# Patient Record
Sex: FEMALE | Race: WHITE | NOT HISPANIC OR LATINO | Employment: OTHER | URBAN - METROPOLITAN AREA
[De-identification: names, ages, dates, MRNs, and addresses within clinical notes are randomized per-mention and may not be internally consistent; named-entity substitution may affect disease eponyms.]

---

## 2023-10-10 ENCOUNTER — TELEPHONE (OUTPATIENT)
Dept: PRIMARY CARE | Facility: CLINIC | Age: 83
End: 2023-10-10
Payer: MEDICARE

## 2023-10-10 NOTE — TELEPHONE ENCOUNTER
Amrita with [facility] called to speak to provider. LT leg wound is larger today with yellow slough. Also there is an odor from wound even after cleaning. Pt is usually followed by Benita Cooper CNP at [facility].

## 2023-10-11 ENCOUNTER — HOME VISIT (OUTPATIENT)
Dept: POST ACUTE CARE | Facility: EXTERNAL LOCATION | Age: 83
End: 2023-10-11
Payer: MEDICARE

## 2023-10-11 VITALS
TEMPERATURE: 99.2 F | SYSTOLIC BLOOD PRESSURE: 136 MMHG | DIASTOLIC BLOOD PRESSURE: 70 MMHG | OXYGEN SATURATION: 97 % | HEART RATE: 77 BPM | RESPIRATION RATE: 16 BRPM

## 2023-10-11 DIAGNOSIS — L03.115 BILATERAL CELLULITIS OF LOWER LEG: Primary | ICD-10-CM

## 2023-10-11 DIAGNOSIS — L03.116 BILATERAL CELLULITIS OF LOWER LEG: Primary | ICD-10-CM

## 2023-10-11 PROBLEM — L02.416 CELLULITIS AND ABSCESS OF LEFT LEG: Status: ACTIVE | Noted: 2023-10-11

## 2023-10-11 PROCEDURE — 99349 HOME/RES VST EST MOD MDM 40: CPT | Performed by: NURSE PRACTITIONER

## 2023-10-11 ASSESSMENT — PAIN SCALES - GENERAL: PAINLEVEL: 8

## 2023-10-11 ASSESSMENT — ENCOUNTER SYMPTOMS
FATIGUE: 1
GASTROINTESTINAL NEGATIVE: 1
ALLERGIC/IMMUNOLOGIC NEGATIVE: 1
ENDOCRINE NEGATIVE: 1
WEAKNESS: 1
BRUISES/BLEEDS EASILY: 1
EYES NEGATIVE: 1
SHORTNESS OF BREATH: 1
WOUND: 1
PSYCHIATRIC NEGATIVE: 1

## 2023-10-11 NOTE — PROGRESS NOTES
Subjective   Patient ID: Amrita Lopez is a 82 y.o. female who presents for follow up bilat leg wounds.     HPI Pt seen sitting in her wheelchair in [facility], pt transported via wheelchair to her private apartment and assisted into her recliner where she sat with legs elevated. Amrita from [facility] requesting visit for leg wound on left leg appearing worse. Dressing changes being done every other day by [facility]. Area being cleaned with normal saline and calcium alginate applied. Covered with Abd pad and Kerlix and tubi . Pt with c/o leg pain in left leg, very tender with touch.  Pt denies headaches, dizziness, chills, or night sweats. Denies vision or hearing problems. Denies CP or palpitations. Admits to cough and SOB, pt states she still smokes. Admits to incontinence. Denies N/V/D or constipation. Admits to good appetite and sleeping well, naps during day and with c/o being tired.      Review of Systems   Constitutional:  Positive for fatigue.   HENT: Negative.     Eyes: Negative.    Respiratory:  Positive for shortness of breath.    Cardiovascular:  Positive for leg swelling.   Gastrointestinal: Negative.    Endocrine: Negative.    Genitourinary: Negative.    Musculoskeletal:  Positive for gait problem.   Skin:  Positive for wound.   Allergic/Immunologic: Negative.    Neurological:  Positive for weakness.   Hematological:  Bruises/bleeds easily.   Psychiatric/Behavioral: Negative.       Objective   /70 (BP Location: Left arm) Comment (BP Location): left wrist  Pulse 77   Temp 37.3 °C (99.2 °F) (Temporal)   Resp 16   SpO2 97%     Physical Exam  Constitutional:       Appearance: She is obese.   HENT:      Head: Normocephalic.      Nose: Nose normal.      Mouth/Throat:      Mouth: Mucous membranes are moist.   Eyes:      Pupils: Pupils are equal, round, and reactive to light.   Cardiovascular:      Rate and Rhythm: Normal rate and regular rhythm.      Pulses: Normal pulses.      Heart sounds:  Normal heart sounds.   Pulmonary:      Breath sounds: Decreased air movement present. Rhonchi present.      Comments: Moist non productive cough  Abdominal:      General: Bowel sounds are normal.      Palpations: Abdomen is soft.   Genitourinary:     Comments: incontinent  Musculoskeletal:      Cervical back: Normal range of motion.      Right lower leg: 3+ Edema present.      Left lower leg: 3+ Edema present.   Skin:     Capillary Refill: Capillary refill takes less than 2 seconds.      Findings: Bruising present.      Comments: Right leg with small nickel size openings on lower leg with slight serous drainage. Right leg with extremely dry and flaky skin. Left inner lower leg with open area with yellow slough with brownish yellow drainage. Wound with positive odor. Wound with beefy red color and irregular borders. Area around wound with dry flaky skin. Area cleansed with normal saline, DSD applied.   Ecchymosis upper extremities   Neurological:      General: No focal deficit present.      Mental Status: She is alert and oriented to person, place, and time. Mental status is at baseline.   Psychiatric:         Mood and Affect: Mood normal.         Behavior: Behavior normal.         Thought Content: Thought content normal.         Judgment: Judgment normal.       Problem List Items Addressed This Visit    None  Visit Diagnoses       Bilateral cellulitis of lower leg    -  Primary    Start doxycylcline 100 mg po bid for 7 days  Vashe wash to legs  Consult Stephani Cameron CNP wound care specialist  Collaborated plan with Amrita MACKEY from [facility]

## 2023-10-18 ENCOUNTER — NURSING HOME VISIT (OUTPATIENT)
Dept: PRIMARY CARE | Facility: CLINIC | Age: 83
End: 2023-10-18
Payer: MEDICARE

## 2023-10-18 DIAGNOSIS — L03.90 WOUND CELLULITIS: Primary | ICD-10-CM

## 2023-10-18 PROCEDURE — 99348 HOME/RES VST EST LOW MDM 30: CPT

## 2023-10-18 ASSESSMENT — PAIN SCALES - GENERAL: PAINLEVEL: 0-NO PAIN

## 2023-10-20 PROBLEM — I69.998 WEAKNESS AS LATE EFFECT OF CEREBROVASCULAR ACCIDENT (CVA): Status: ACTIVE | Noted: 2023-10-20

## 2023-10-20 PROBLEM — F41.9 ANXIETY: Status: ACTIVE | Noted: 2023-10-20

## 2023-10-20 PROBLEM — E55.9 VITAMIN D DEFICIENCY: Status: ACTIVE | Noted: 2023-10-20

## 2023-10-20 PROBLEM — F03.90 DEMENTIA (MULTI): Status: ACTIVE | Noted: 2023-10-20

## 2023-10-20 PROBLEM — R26.2 DIFFICULTY IN WALKING: Status: ACTIVE | Noted: 2023-10-20

## 2023-10-20 PROBLEM — R06.00 DYSPNEA: Status: ACTIVE | Noted: 2023-10-20

## 2023-10-20 PROBLEM — R29.6 RECURRENT FALLS: Status: ACTIVE | Noted: 2023-10-20

## 2023-10-20 PROBLEM — M25.562 ACUTE PAIN OF LEFT KNEE: Status: ACTIVE | Noted: 2023-10-20

## 2023-10-20 PROBLEM — I63.9 STROKE (MULTI): Status: ACTIVE | Noted: 2023-10-20

## 2023-10-20 PROBLEM — E78.5 HYPERLIPIDEMIA: Status: ACTIVE | Noted: 2023-10-20

## 2023-10-20 PROBLEM — F17.200 TOBACCO USE DISORDER: Status: ACTIVE | Noted: 2023-10-20

## 2023-10-20 PROBLEM — M19.90 OSTEOARTHRITIS: Status: ACTIVE | Noted: 2023-10-20

## 2023-10-20 PROBLEM — I10 HYPERTENSION: Status: ACTIVE | Noted: 2023-10-20

## 2023-10-20 PROBLEM — G47.19 EXCESSIVE DAYTIME SLEEPINESS: Status: ACTIVE | Noted: 2023-10-20

## 2023-10-20 PROBLEM — G92.8 TOXIC METABOLIC ENCEPHALOPATHY: Status: ACTIVE | Noted: 2023-10-20

## 2023-10-20 PROBLEM — I63.9 CEREBROVASCULAR ACCIDENT (MULTI): Status: ACTIVE | Noted: 2023-10-20

## 2023-10-20 PROBLEM — R52 GENERALIZED PAIN: Status: ACTIVE | Noted: 2023-10-20

## 2023-10-20 PROBLEM — R53.1 WEAKNESS AS LATE EFFECT OF CEREBROVASCULAR ACCIDENT (CVA): Status: ACTIVE | Noted: 2023-10-20

## 2023-10-20 PROBLEM — F32.9 MAJOR DEPRESSIVE DISORDER, SINGLE EPISODE, UNSPECIFIED: Status: ACTIVE | Noted: 2023-10-20

## 2023-10-20 PROBLEM — R63.5 WEIGHT GAIN: Status: ACTIVE | Noted: 2023-10-20

## 2023-10-20 RX ORDER — ATORVASTATIN CALCIUM 10 MG/1
10 TABLET, FILM COATED ORAL DAILY
Status: ON HOLD | COMMUNITY
End: 2024-05-24

## 2023-10-20 RX ORDER — AMMONIUM LACTATE 12 G/100G
1 LOTION TOPICAL 2 TIMES DAILY
COMMUNITY
Start: 2023-05-04

## 2023-10-20 RX ORDER — LANOLIN ALCOHOL/MO/W.PET/CERES
500 CREAM (GRAM) TOPICAL DAILY
COMMUNITY

## 2023-10-20 RX ORDER — MAGNESIUM HYDROXIDE 2400 MG/10ML
30 SUSPENSION ORAL DAILY PRN
COMMUNITY

## 2023-10-20 RX ORDER — NYSTATIN 100000 [USP'U]/G
1 POWDER TOPICAL 2 TIMES DAILY
Status: ON HOLD | COMMUNITY
Start: 2023-07-26 | End: 2024-05-24

## 2023-10-20 RX ORDER — FUROSEMIDE 40 MG/1
40 TABLET ORAL DAILY
COMMUNITY
Start: 2023-03-08

## 2023-10-20 RX ORDER — LORATADINE 10 MG/1
10 TABLET ORAL DAILY
COMMUNITY

## 2023-10-20 RX ORDER — CLOPIDOGREL BISULFATE 75 MG/1
75 TABLET ORAL DAILY
COMMUNITY

## 2023-10-20 RX ORDER — MELOXICAM 7.5 MG/1
7.5 TABLET ORAL DAILY
Status: ON HOLD | COMMUNITY
Start: 2023-09-19 | End: 2024-05-24

## 2023-10-20 RX ORDER — ACETAMINOPHEN 325 MG/1
650 TABLET ORAL EVERY 6 HOURS PRN
COMMUNITY

## 2023-10-20 RX ORDER — ACETAMINOPHEN 500 MG
500 TABLET ORAL EVERY 8 HOURS
COMMUNITY
End: 2023-10-29 | Stop reason: HOSPADM

## 2023-10-20 ASSESSMENT — ENCOUNTER SYMPTOMS
SHORTNESS OF BREATH: 0
PSYCHIATRIC NEGATIVE: 1
WOUND: 1
HEMATOLOGIC/LYMPHATIC NEGATIVE: 1
ENDOCRINE NEGATIVE: 1
GASTROINTESTINAL NEGATIVE: 1
COUGH: 0
CHILLS: 0
NEUROLOGICAL NEGATIVE: 1
FATIGUE: 0
EYES NEGATIVE: 1
FEVER: 0
ROS SKIN COMMENTS: BILATERAL LOWER LEGS
MUSCULOSKELETAL NEGATIVE: 1

## 2023-10-21 NOTE — PROGRESS NOTES
Subjective   Patient ID: Amrita Lopez is a 82 y.o. female who is assisted living/ home patient being seen and evaluated for reported yellow drainage to left leg.     HPI Visit made to patient for reports of yellow drainage to lower left leg with odor.  Pt currently being followed by home health care/wound care. Pt recently treated with doxycycline for same report a week ago. Pt currently still smoking. Pt denies fever, chills, admits to pain in left leg. Takes tylenol which is effective.     Current Outpatient Medications on File Prior to Visit   Medication Sig Dispense Refill    acetaminophen (Tylenol) 325 mg tablet Take 2 tablets (650 mg) by mouth every 6 hours if needed for mild pain (1 - 3), headaches or fever (temp greater than 38.0 C).      albuterol 108 (90 Base) MCG/ACT inhaler Inhale 2 puffs every 2 hours if needed for wheezing or shortness of breath.      ammonium lactate (Lac-Hydrin) 12 % lotion Apply 1 Application topically 2 times a day.      atorvastatin (Lipitor) 10 mg tablet Take 1 tablet (10 mg) by mouth once daily.      clopidogrel (Plavix) 75 mg tablet Take 1 tablet (75 mg) by mouth once daily.      cyanocobalamin (Vitamin B-12) 1,000 mcg tablet Take 0.5 tablets (500 mcg) by mouth once daily.      furosemide (Lasix) 40 mg tablet Take 1 tablet (40 mg) by mouth once daily.      loratadine (Claritin) 10 mg tablet Take 1 tablet (10 mg) by mouth once daily.      magnesium hydroxide (Milk of Magnesia) 2,400 mg/10 mL suspension suspension Take 30 mL by mouth once daily as needed for constipation.      meloxicam (Mobic) 7.5 mg tablet Take 1 tablet (7.5 mg) by mouth once daily.      nystatin (Mycostatin) 100,000 unit/gram powder Apply 1 Application topically 2 times a day.      acetaminophen (Tylenol) 500 mg tablet Take 1 tablet (500 mg) by mouth every 8 hours.       No current facility-administered medications on file prior to visit.         Review of Systems   Constitutional:  Negative for chills,  fatigue and fever.   HENT: Negative.     Eyes: Negative.    Respiratory:  Negative for cough and shortness of breath.    Cardiovascular:  Positive for leg swelling.   Gastrointestinal: Negative.    Endocrine: Negative.    Genitourinary: Negative.    Musculoskeletal: Negative.    Skin:  Positive for wound.        Bilateral lower legs   Neurological: Negative.    Hematological: Negative.    Psychiatric/Behavioral: Negative.         Objective   There were no vitals taken for this visit.    Physical Exam  Constitutional:       Appearance: Normal appearance.   HENT:      Head: Normocephalic.   Eyes:      Pupils: Pupils are equal, round, and reactive to light.   Cardiovascular:      Rate and Rhythm: Regular rhythm.   Musculoskeletal:      Cervical back: Normal range of motion.      Right lower leg: Edema present.      Left lower leg: Edema present.   Skin:     General: Skin is warm.      Comments:  Left inner lower leg with open area with yellow slough with brownish yellow drainage. Wound with positive odor. Wound with beefy red color and   Neurological:      Mental Status: She is alert. Mental status is at baseline.   Psychiatric:         Behavior: Behavior normal.       Assessment/Plan   Diagnoses and all orders for this visit:  Wound cellulitis  Comments:  Order written in facility to obtain wound culture. Collaboration with Stephani Cameron [facility] wound care specialist who will obtain PCR of wound if not completed.

## 2023-10-24 ENCOUNTER — HOSPITAL ENCOUNTER (INPATIENT)
Facility: HOSPITAL | Age: 83
LOS: 5 days | Discharge: NURSING, REHAB OR LONG-TERM CARE | DRG: 603 | End: 2023-10-29
Attending: EMERGENCY MEDICINE | Admitting: INTERNAL MEDICINE
Payer: MEDICARE

## 2023-10-24 DIAGNOSIS — K21.9 GASTROESOPHAGEAL REFLUX DISEASE WITHOUT ESOPHAGITIS: ICD-10-CM

## 2023-10-24 DIAGNOSIS — R06.00 DYSPNEA, UNSPECIFIED TYPE: ICD-10-CM

## 2023-10-24 DIAGNOSIS — R11.2 NAUSEA AND VOMITING, UNSPECIFIED VOMITING TYPE: ICD-10-CM

## 2023-10-24 DIAGNOSIS — K59.00 CONSTIPATION, UNSPECIFIED CONSTIPATION TYPE: ICD-10-CM

## 2023-10-24 DIAGNOSIS — L03.119 CELLULITIS OF LOWER EXTREMITY, UNSPECIFIED LATERALITY: Primary | ICD-10-CM

## 2023-10-24 DIAGNOSIS — J44.9 CHRONIC OBSTRUCTIVE PULMONARY DISEASE, UNSPECIFIED COPD TYPE (MULTI): ICD-10-CM

## 2023-10-24 DIAGNOSIS — I10 DIASTOLIC HYPERTENSION: ICD-10-CM

## 2023-10-24 LAB
ALBUMIN SERPL-MCNC: 3.9 G/DL (ref 3.5–5)
ALP BLD-CCNC: 93 U/L (ref 35–125)
ALT SERPL-CCNC: 8 U/L (ref 5–40)
ANION GAP SERPL CALC-SCNC: 7 MMOL/L
AST SERPL-CCNC: 11 U/L (ref 5–40)
BASOPHILS # BLD AUTO: 0.08 X10*3/UL (ref 0–0.1)
BASOPHILS NFR BLD AUTO: 0.7 %
BILIRUB SERPL-MCNC: 0.3 MG/DL (ref 0.1–1.2)
BUN SERPL-MCNC: 20 MG/DL (ref 8–25)
CALCIUM SERPL-MCNC: 9.5 MG/DL (ref 8.5–10.4)
CHLORIDE SERPL-SCNC: 101 MMOL/L (ref 97–107)
CO2 SERPL-SCNC: 30 MMOL/L (ref 24–31)
CREAT SERPL-MCNC: 0.8 MG/DL (ref 0.4–1.6)
EOSINOPHIL # BLD AUTO: 0.84 X10*3/UL (ref 0–0.4)
EOSINOPHIL NFR BLD AUTO: 7.3 %
ERYTHROCYTE [DISTWIDTH] IN BLOOD BY AUTOMATED COUNT: 13.5 % (ref 11.5–14.5)
GFR SERPL CREATININE-BSD FRML MDRD: 75 ML/MIN/1.73M*2
GLUCOSE SERPL-MCNC: 104 MG/DL (ref 65–99)
HCT VFR BLD AUTO: 41.4 % (ref 36–46)
HGB BLD-MCNC: 13.2 G/DL (ref 12–16)
IMM GRANULOCYTES # BLD AUTO: 0.04 X10*3/UL (ref 0–0.5)
IMM GRANULOCYTES NFR BLD AUTO: 0.3 % (ref 0–0.9)
LYMPHOCYTES # BLD AUTO: 3.21 X10*3/UL (ref 0.8–3)
LYMPHOCYTES NFR BLD AUTO: 28 %
MCH RBC QN AUTO: 31.3 PG (ref 26–34)
MCHC RBC AUTO-ENTMCNC: 31.9 G/DL (ref 32–36)
MCV RBC AUTO: 98 FL (ref 80–100)
MONOCYTES # BLD AUTO: 0.87 X10*3/UL (ref 0.05–0.8)
MONOCYTES NFR BLD AUTO: 7.6 %
NEUTROPHILS # BLD AUTO: 6.43 X10*3/UL (ref 1.6–5.5)
NEUTROPHILS NFR BLD AUTO: 56.1 %
NRBC BLD-RTO: 0 /100 WBCS (ref 0–0)
PLATELET # BLD AUTO: 275 X10*3/UL (ref 150–450)
PMV BLD AUTO: 9.4 FL (ref 7.5–11.5)
POTASSIUM SERPL-SCNC: 4.5 MMOL/L (ref 3.4–5.1)
PROT SERPL-MCNC: 6.4 G/DL (ref 5.9–7.9)
RBC # BLD AUTO: 4.22 X10*6/UL (ref 4–5.2)
SODIUM SERPL-SCNC: 138 MMOL/L (ref 133–145)
WBC # BLD AUTO: 11.5 X10*3/UL (ref 4.4–11.3)

## 2023-10-24 PROCEDURE — 96372 THER/PROPH/DIAG INJ SC/IM: CPT | Performed by: INTERNAL MEDICINE

## 2023-10-24 PROCEDURE — 99285 EMERGENCY DEPT VISIT HI MDM: CPT | Mod: 25 | Performed by: EMERGENCY MEDICINE

## 2023-10-24 PROCEDURE — 1200000002 HC GENERAL ROOM WITH TELEMETRY DAILY

## 2023-10-24 PROCEDURE — 36415 COLL VENOUS BLD VENIPUNCTURE: CPT

## 2023-10-24 PROCEDURE — 80053 COMPREHEN METABOLIC PANEL: CPT

## 2023-10-24 PROCEDURE — 2500000004 HC RX 250 GENERAL PHARMACY W/ HCPCS (ALT 636 FOR OP/ED): Performed by: INTERNAL MEDICINE

## 2023-10-24 PROCEDURE — 85025 COMPLETE CBC W/AUTO DIFF WBC: CPT

## 2023-10-24 PROCEDURE — 2500000004 HC RX 250 GENERAL PHARMACY W/ HCPCS (ALT 636 FOR OP/ED): Performed by: EMERGENCY MEDICINE

## 2023-10-24 PROCEDURE — 81003 URINALYSIS AUTO W/O SCOPE: CPT | Performed by: INTERNAL MEDICINE

## 2023-10-24 PROCEDURE — 2500000001 HC RX 250 WO HCPCS SELF ADMINISTERED DRUGS (ALT 637 FOR MEDICARE OP): Performed by: INTERNAL MEDICINE

## 2023-10-24 RX ORDER — ACETAMINOPHEN 650 MG/1
650 SUPPOSITORY RECTAL EVERY 4 HOURS PRN
Status: DISCONTINUED | OUTPATIENT
Start: 2023-10-24 | End: 2023-10-29 | Stop reason: HOSPADM

## 2023-10-24 RX ORDER — MELOXICAM 7.5 MG/1
7.5 TABLET ORAL DAILY
Status: DISCONTINUED | OUTPATIENT
Start: 2023-10-25 | End: 2023-10-29 | Stop reason: HOSPADM

## 2023-10-24 RX ORDER — ACETAMINOPHEN 650 MG/1
650 SUPPOSITORY RECTAL EVERY 4 HOURS PRN
Status: DISCONTINUED | OUTPATIENT
Start: 2023-10-24 | End: 2023-10-24 | Stop reason: SDUPTHER

## 2023-10-24 RX ORDER — ACETAMINOPHEN 325 MG/1
650 TABLET ORAL EVERY 4 HOURS PRN
Status: DISCONTINUED | OUTPATIENT
Start: 2023-10-24 | End: 2023-10-29 | Stop reason: HOSPADM

## 2023-10-24 RX ORDER — CLOPIDOGREL BISULFATE 75 MG/1
75 TABLET ORAL DAILY
Status: DISCONTINUED | OUTPATIENT
Start: 2023-10-25 | End: 2023-10-29 | Stop reason: HOSPADM

## 2023-10-24 RX ORDER — POLYETHYLENE GLYCOL 3350 17 G/17G
17 POWDER, FOR SOLUTION ORAL DAILY PRN
Status: DISCONTINUED | OUTPATIENT
Start: 2023-10-24 | End: 2023-10-29 | Stop reason: HOSPADM

## 2023-10-24 RX ORDER — ACETAMINOPHEN 500 MG
500 TABLET ORAL EVERY 8 HOURS SCHEDULED
Status: DISCONTINUED | OUTPATIENT
Start: 2023-10-24 | End: 2023-10-29 | Stop reason: HOSPADM

## 2023-10-24 RX ORDER — CLOTRIMAZOLE AND BETAMETHASONE DIPROPIONATE 10; .64 MG/G; MG/G
1 CREAM TOPICAL 2 TIMES DAILY
Status: CANCELLED | OUTPATIENT
Start: 2023-10-24

## 2023-10-24 RX ORDER — MAGNESIUM HYDROXIDE 2400 MG/10ML
30 SUSPENSION ORAL DAILY PRN
Status: DISCONTINUED | OUTPATIENT
Start: 2023-10-24 | End: 2023-10-29 | Stop reason: HOSPADM

## 2023-10-24 RX ORDER — ENOXAPARIN SODIUM 100 MG/ML
40 INJECTION SUBCUTANEOUS EVERY 12 HOURS SCHEDULED
Status: DISCONTINUED | OUTPATIENT
Start: 2023-10-24 | End: 2023-10-29 | Stop reason: HOSPADM

## 2023-10-24 RX ORDER — ONDANSETRON 4 MG/1
4 TABLET, ORALLY DISINTEGRATING ORAL EVERY 8 HOURS PRN
Status: DISCONTINUED | OUTPATIENT
Start: 2023-10-24 | End: 2023-10-29 | Stop reason: HOSPADM

## 2023-10-24 RX ORDER — ATORVASTATIN CALCIUM 10 MG/1
10 TABLET, FILM COATED ORAL NIGHTLY
Status: DISCONTINUED | OUTPATIENT
Start: 2023-10-24 | End: 2023-10-29 | Stop reason: HOSPADM

## 2023-10-24 RX ORDER — ALBUTEROL SULFATE 0.83 MG/ML
2.5 SOLUTION RESPIRATORY (INHALATION) EVERY 2 HOUR PRN
Status: DISCONTINUED | OUTPATIENT
Start: 2023-10-24 | End: 2023-10-29 | Stop reason: HOSPADM

## 2023-10-24 RX ORDER — ALBUTEROL SULFATE 90 UG/1
2 AEROSOL, METERED RESPIRATORY (INHALATION) EVERY 2 HOUR PRN
Status: DISCONTINUED | OUTPATIENT
Start: 2023-10-24 | End: 2023-10-24

## 2023-10-24 RX ORDER — ACETAMINOPHEN 325 MG/1
650 TABLET ORAL EVERY 6 HOURS PRN
Status: DISCONTINUED | OUTPATIENT
Start: 2023-10-24 | End: 2023-10-29 | Stop reason: HOSPADM

## 2023-10-24 RX ORDER — GUAIFENESIN/DEXTROMETHORPHAN 100-10MG/5
5 SYRUP ORAL EVERY 4 HOURS PRN
Status: DISCONTINUED | OUTPATIENT
Start: 2023-10-24 | End: 2023-10-24 | Stop reason: SDUPTHER

## 2023-10-24 RX ORDER — ACETAMINOPHEN 160 MG/5ML
650 SOLUTION ORAL EVERY 4 HOURS PRN
Status: DISCONTINUED | OUTPATIENT
Start: 2023-10-24 | End: 2023-10-29 | Stop reason: HOSPADM

## 2023-10-24 RX ORDER — ACETAMINOPHEN 160 MG/5ML
650 SOLUTION ORAL EVERY 4 HOURS PRN
Status: DISCONTINUED | OUTPATIENT
Start: 2023-10-24 | End: 2023-10-24 | Stop reason: SDUPTHER

## 2023-10-24 RX ORDER — ONDANSETRON HYDROCHLORIDE 2 MG/ML
4 INJECTION, SOLUTION INTRAVENOUS EVERY 8 HOURS PRN
Status: DISCONTINUED | OUTPATIENT
Start: 2023-10-24 | End: 2023-10-29 | Stop reason: HOSPADM

## 2023-10-24 RX ORDER — GUAIFENESIN/DEXTROMETHORPHAN 100-10MG/5
5 SYRUP ORAL EVERY 4 HOURS PRN
Status: DISCONTINUED | OUTPATIENT
Start: 2023-10-24 | End: 2023-10-29 | Stop reason: HOSPADM

## 2023-10-24 RX ORDER — FUROSEMIDE 10 MG/ML
40 INJECTION INTRAMUSCULAR; INTRAVENOUS EVERY 8 HOURS
Status: COMPLETED | OUTPATIENT
Start: 2023-10-24 | End: 2023-10-25

## 2023-10-24 RX ORDER — POLYETHYLENE GLYCOL 3350 17 G/17G
17 POWDER, FOR SOLUTION ORAL DAILY PRN
Status: DISCONTINUED | OUTPATIENT
Start: 2023-10-24 | End: 2023-10-24 | Stop reason: SDUPTHER

## 2023-10-24 RX ORDER — LORATADINE 10 MG/1
10 TABLET ORAL DAILY
Status: DISCONTINUED | OUTPATIENT
Start: 2023-10-25 | End: 2023-10-27

## 2023-10-24 RX ORDER — SENNOSIDES 8.6 MG/1
2 TABLET ORAL 2 TIMES DAILY
Status: DISCONTINUED | OUTPATIENT
Start: 2023-10-24 | End: 2023-10-29 | Stop reason: HOSPADM

## 2023-10-24 RX ORDER — HEPARIN SODIUM 5000 [USP'U]/ML
7500 INJECTION, SOLUTION INTRAVENOUS; SUBCUTANEOUS EVERY 8 HOURS SCHEDULED
Status: DISCONTINUED | OUTPATIENT
Start: 2023-10-24 | End: 2023-10-24 | Stop reason: ALTCHOICE

## 2023-10-24 RX ORDER — AMMONIUM LACTATE 12 G/100G
1 LOTION TOPICAL 2 TIMES DAILY
Status: DISCONTINUED | OUTPATIENT
Start: 2023-10-24 | End: 2023-10-29 | Stop reason: HOSPADM

## 2023-10-24 RX ORDER — FUROSEMIDE 40 MG/1
40 TABLET ORAL DAILY
Status: DISCONTINUED | OUTPATIENT
Start: 2023-10-25 | End: 2023-10-29 | Stop reason: HOSPADM

## 2023-10-24 RX ORDER — GUAIFENESIN 600 MG/1
600 TABLET, EXTENDED RELEASE ORAL EVERY 12 HOURS PRN
Status: DISCONTINUED | OUTPATIENT
Start: 2023-10-24 | End: 2023-10-24 | Stop reason: SDUPTHER

## 2023-10-24 RX ORDER — GUAIFENESIN 600 MG/1
600 TABLET, EXTENDED RELEASE ORAL EVERY 12 HOURS PRN
Status: DISCONTINUED | OUTPATIENT
Start: 2023-10-24 | End: 2023-10-29 | Stop reason: HOSPADM

## 2023-10-24 RX ORDER — ACETAMINOPHEN 325 MG/1
650 TABLET ORAL EVERY 4 HOURS PRN
Status: DISCONTINUED | OUTPATIENT
Start: 2023-10-24 | End: 2023-10-24 | Stop reason: SDUPTHER

## 2023-10-24 RX ORDER — VANCOMYCIN 750 MG/150ML
750 INJECTION, SOLUTION INTRAVENOUS EVERY 12 HOURS
Status: DISCONTINUED | OUTPATIENT
Start: 2023-10-25 | End: 2023-10-25

## 2023-10-24 RX ORDER — UBIDECARENONE 75 MG
500 CAPSULE ORAL DAILY
Status: DISCONTINUED | OUTPATIENT
Start: 2023-10-25 | End: 2023-10-29 | Stop reason: HOSPADM

## 2023-10-24 RX ORDER — NYSTATIN 100000 [USP'U]/G
1 POWDER TOPICAL 2 TIMES DAILY
Status: DISCONTINUED | OUTPATIENT
Start: 2023-10-24 | End: 2023-10-29 | Stop reason: HOSPADM

## 2023-10-24 RX ADMIN — SENNOSIDES 17.2 MG: 8.6 TABLET, FILM COATED ORAL at 23:30

## 2023-10-24 RX ADMIN — PIPERACILLIN SODIUM AND TAZOBACTAM SODIUM 3.38 G: 3; .375 INJECTION, SOLUTION INTRAVENOUS at 19:59

## 2023-10-24 RX ADMIN — FUROSEMIDE 40 MG: 10 INJECTION, SOLUTION INTRAMUSCULAR; INTRAVENOUS at 23:30

## 2023-10-24 RX ADMIN — NYSTATIN 1 APPLICATION: 100000 POWDER TOPICAL at 23:55

## 2023-10-24 RX ADMIN — ATORVASTATIN CALCIUM 10 MG: 10 TABLET, FILM COATED ORAL at 23:29

## 2023-10-24 RX ADMIN — ENOXAPARIN SODIUM 40 MG: 40 INJECTION SUBCUTANEOUS at 23:29

## 2023-10-24 RX ADMIN — ACETAMINOPHEN 500 MG: 500 TABLET ORAL at 23:30

## 2023-10-24 RX ADMIN — Medication 1 APPLICATION: at 23:31

## 2023-10-24 SDOH — SOCIAL STABILITY: SOCIAL INSECURITY: WERE YOU ABLE TO COMPLETE ALL THE BEHAVIORAL HEALTH SCREENINGS?: YES

## 2023-10-24 SDOH — SOCIAL STABILITY: SOCIAL INSECURITY: HAVE YOU HAD THOUGHTS OF HARMING ANYONE ELSE?: NO

## 2023-10-24 ASSESSMENT — PAIN - FUNCTIONAL ASSESSMENT: PAIN_FUNCTIONAL_ASSESSMENT: 0-10

## 2023-10-24 ASSESSMENT — LIFESTYLE VARIABLES
EVER FELT BAD OR GUILTY ABOUT YOUR DRINKING: NO
AUDIT-C TOTAL SCORE: 0
AUDIT-C TOTAL SCORE: 0
HAVE YOU EVER FELT YOU SHOULD CUT DOWN ON YOUR DRINKING: NO
EVER HAD A DRINK FIRST THING IN THE MORNING TO STEADY YOUR NERVES TO GET RID OF A HANGOVER: NO
HAVE PEOPLE ANNOYED YOU BY CRITICIZING YOUR DRINKING: NO
HOW OFTEN DO YOU HAVE 6 OR MORE DRINKS ON ONE OCCASION: NEVER
HOW OFTEN DO YOU HAVE A DRINK CONTAINING ALCOHOL: NEVER
SKIP TO QUESTIONS 9-10: 1
HOW MANY STANDARD DRINKS CONTAINING ALCOHOL DO YOU HAVE ON A TYPICAL DAY: PATIENT DOES NOT DRINK
REASON UNABLE TO ASSESS: NO

## 2023-10-24 ASSESSMENT — PATIENT HEALTH QUESTIONNAIRE - PHQ9
2. FEELING DOWN, DEPRESSED OR HOPELESS: NOT AT ALL
1. LITTLE INTEREST OR PLEASURE IN DOING THINGS: NOT AT ALL
SUM OF ALL RESPONSES TO PHQ9 QUESTIONS 1 & 2: 0

## 2023-10-24 ASSESSMENT — COLUMBIA-SUICIDE SEVERITY RATING SCALE - C-SSRS
1. IN THE PAST MONTH, HAVE YOU WISHED YOU WERE DEAD OR WISHED YOU COULD GO TO SLEEP AND NOT WAKE UP?: NO
6. HAVE YOU EVER DONE ANYTHING, STARTED TO DO ANYTHING, OR PREPARED TO DO ANYTHING TO END YOUR LIFE?: NO
6. HAVE YOU EVER DONE ANYTHING, STARTED TO DO ANYTHING, OR PREPARED TO DO ANYTHING TO END YOUR LIFE?: NO
1. IN THE PAST MONTH, HAVE YOU WISHED YOU WERE DEAD OR WISHED YOU COULD GO TO SLEEP AND NOT WAKE UP?: NO
2. HAVE YOU ACTUALLY HAD ANY THOUGHTS OF KILLING YOURSELF?: NO
2. HAVE YOU ACTUALLY HAD ANY THOUGHTS OF KILLING YOURSELF?: NO

## 2023-10-24 ASSESSMENT — PAIN SCALES - GENERAL
PAINLEVEL_OUTOF10: 0 - NO PAIN
PAINLEVEL_OUTOF10: 4

## 2023-10-24 NOTE — ED PROVIDER NOTES
HPI   Chief Complaint   Patient presents with    Leg Swelling     infection       Patient is an 82-year-old female presents emergency department for bilateral leg infections worse on left than right.  She states that she thinks they have been red and swollen for the last 2 weeks approximately.  She states that last week she was on an oral antibiotic, that she did not recall the name of.  According to her records it was doxycycline.  She states that she is here today because her wound care nurse presented and felt that her infections were worsening and sent her here.  She states that her left leg is tender and oozing and foul-smelling.  She states that the wound care nurse does home visits to change her bandages.  She feels that its not getting any better after oral antibiotics.  She otherwise feels fine with no fevers, chills, nausea, vomiting, cough, congestion, shortness of breath, chest pain.      History provided by:  Patient   used: No                        Ceci Coma Scale Score: 15                  Patient History   No past medical history on file.  Past Surgical History:   Procedure Laterality Date    MR HEAD ANGIO WO IV CONTRAST  11/3/2020    MR HEAD ANGIO WO IV CONTRAST [facility]    MR HEAD ANGIO WO IV CONTRAST  3/29/2021    MR HEAD ANGIO WO IV CONTRAST [facility]    MR NECK ANGIO WO IV CONTRAST  3/30/2021    MR NECK ANGIO WO IV CONTRAST [facility]     No family history on file.  Social History     Tobacco Use    Smoking status: Every Day     Packs/day: .5     Types: Cigarettes    Smokeless tobacco: Never   Substance Use Topics    Alcohol use: Defer    Drug use: Never       Physical Exam   ED Triage Vitals [10/24/23 1624]   Temp Heart Rate Resp BP   36.6 °C (97.9 °F) 72 17 133/60      SpO2 Temp src Heart Rate Source Patient Position   100 % -- -- --      BP Location FiO2 (%)     -- --       Physical Exam  Constitutional:       Appearance: Normal  appearance.   HENT:      Head: Normocephalic and atraumatic.   Eyes:      Extraocular Movements: Extraocular movements intact.      Pupils: Pupils are equal, round, and reactive to light.   Cardiovascular:      Rate and Rhythm: Normal rate and regular rhythm.   Pulmonary:      Effort: Pulmonary effort is normal.      Breath sounds: Normal breath sounds.   Abdominal:      General: Abdomen is flat.      Palpations: Abdomen is soft.   Musculoskeletal:         General: Normal range of motion.   Skin:     General: Skin is warm.      Comments: Bilateral lower extremity erythema and warmth worse on the left than right.  Left lower extremity with ulcerative wound with clear yellowish discharge.   Neurological:      Mental Status: She is alert.         ED Course & MDM   Diagnoses as of 10/24/23 1840   Cellulitis of lower extremity, unspecified laterality   Diastolic hypertension       Medical Decision Making  Patient is an 82-year-old female presents emergency department for evaluation of bilateral lower extremity wounds and infection.    EKG was interpreted by attending physician.    Lab work done today included CMP, CBC, and wound cultures.  Lab work with borderline leukocytosis otherwise unremarkable.    Scans not warranted at today's visit.    Medications given at today's visit include IV Zosyn    I saw this patient in conjunction with Dr. Childers.  Patient has worsening bilateral cellulitis with worsening wound on the left lower extremity.  Given that patient has trialed outpatient antibiotics with no improvement of her wound and cellulitis, patient would benefit from stay for IV antibiotics and admission.  Dr. Childers spoke with patient's primary care provider Dr. Sanchez who is agreeable to admission of patient for further management.    The patient/family was counseled on clinical impression, expectations, and plan along with recommendations to admission.  All questions were answered and involved parties were  understanding and agreeable to course of treatment.  Case was discussed with admitting physician and any consultants. Bed type, ED treatment and further ED workup decided by joint decision making with admitting team and any consultants. Patient stable for admission per my assessment and further management of patient will be deferred to the inpatient setting.    ** Disclaimer:  Parts of this document were written utilizing a voice to text dictation software.  Note may contain minor transcription or typographical errors that were inadvertently transcribed by the computer software.           Irene Russo PA-C  10/24/23 5959

## 2023-10-24 NOTE — PROGRESS NOTES
Attestation note/supervisory note for BRIANA Russo      The patient is an 82-year-old female presenting to the emergency department for evaluation of cellulitis of her lower extremities.  She reportedly has phonic edema of her legs but it has been more red and weeping over the past couple of days.  She states that she believes that they started her on a red pill treat the infection over the past several days but she does not know what it was.  She states that the nurses at the facility where she resides told her that her doctor, Dr. Sanchez, wanted her admitted for IV antibiotics and they sent her to the emergency room today.  She denies any headache or visual changes.  No chest pain or shortness of breath.  No abdominal pain.  No nausea or vomiting.  No diarrhea constipation but no urinary complaints.  All pertinent positives and negatives are recorded above.  All other systems reviewed and otherwise negative.  Vital signs with diastolic hypertension but otherwise within normal limits.  Physical exam with a well-nourished well-developed female with obesity and a disheveled appearance but no evidence of acute distress.  HEENT exam with dry mucous membranes but otherwise unremarkable.  She has no evidence of airway compromise or respiratory distress.  Abdominal exam is benign.  She has no gross motor, neurologic or vascular deficits on exam.  She does have bilateral lower extremity pitting edema with erythema and warmth of both of her legs.  There is some weeping from the anterior surface of her left lower leg.  She has no gross motor, neurologic or vascular deficits on exam.      Diagnostic labs without significant abnormality      IV antibiotics were ordered.      The case was discussed with the patient's primary care physician, Dr. Sanchez, and he requested admission to his service for treatment of her cellulitis of the bilateral lower extremities.      Critical care time billing is not warranted at this time.      I  personally saw the patient and performed a substantive portion of the visit including all aspects of the medical decision making.      I reviewed the results of the diagnostic labs.      Beena Childers MD

## 2023-10-25 ENCOUNTER — APPOINTMENT (OUTPATIENT)
Dept: RADIOLOGY | Facility: HOSPITAL | Age: 83
DRG: 603 | End: 2023-10-25
Payer: MEDICARE

## 2023-10-25 LAB
ALBUMIN SERPL-MCNC: 3.4 G/DL (ref 3.5–5)
ALP BLD-CCNC: 83 U/L (ref 35–125)
ALT SERPL-CCNC: 7 U/L (ref 5–40)
ANION GAP SERPL CALC-SCNC: 8 MMOL/L
APPEARANCE UR: CLEAR
AST SERPL-CCNC: 10 U/L (ref 5–40)
BILIRUB SERPL-MCNC: 0.6 MG/DL (ref 0.1–1.2)
BILIRUB UR STRIP.AUTO-MCNC: NEGATIVE MG/DL
BUN SERPL-MCNC: 18 MG/DL (ref 8–25)
CALCIUM SERPL-MCNC: 9.1 MG/DL (ref 8.5–10.4)
CHLORIDE SERPL-SCNC: 105 MMOL/L (ref 97–107)
CO2 SERPL-SCNC: 29 MMOL/L (ref 24–31)
COLOR UR: COLORLESS
CREAT SERPL-MCNC: 0.8 MG/DL (ref 0.4–1.6)
ERYTHROCYTE [DISTWIDTH] IN BLOOD BY AUTOMATED COUNT: 13.4 % (ref 11.5–14.5)
GFR SERPL CREATININE-BSD FRML MDRD: 75 ML/MIN/1.73M*2
GLUCOSE SERPL-MCNC: 101 MG/DL (ref 65–99)
GLUCOSE UR STRIP.AUTO-MCNC: NORMAL MG/DL
HCT VFR BLD AUTO: 38.8 % (ref 36–46)
HGB BLD-MCNC: 12.3 G/DL (ref 12–16)
KETONES UR STRIP.AUTO-MCNC: NEGATIVE MG/DL
LEUKOCYTE ESTERASE UR QL STRIP.AUTO: NEGATIVE
MCH RBC QN AUTO: 30.8 PG (ref 26–34)
MCHC RBC AUTO-ENTMCNC: 31.7 G/DL (ref 32–36)
MCV RBC AUTO: 97 FL (ref 80–100)
NITRITE UR QL STRIP.AUTO: NEGATIVE
NRBC BLD-RTO: 0 /100 WBCS (ref 0–0)
PH UR STRIP.AUTO: 6 [PH]
PLATELET # BLD AUTO: 266 X10*3/UL (ref 150–450)
PMV BLD AUTO: 9.8 FL (ref 7.5–11.5)
POTASSIUM SERPL-SCNC: 3.6 MMOL/L (ref 3.4–5.1)
PROT SERPL-MCNC: 5.8 G/DL (ref 5.9–7.9)
PROT UR STRIP.AUTO-MCNC: NEGATIVE MG/DL
RBC # BLD AUTO: 4 X10*6/UL (ref 4–5.2)
RBC # UR STRIP.AUTO: NEGATIVE /UL
SODIUM SERPL-SCNC: 142 MMOL/L (ref 133–145)
SP GR UR STRIP.AUTO: 1.02
UROBILINOGEN UR STRIP.AUTO-MCNC: NORMAL MG/DL
VANCOMYCIN SERPL-MCNC: 7.9 UG/ML (ref 10–20)
WBC # BLD AUTO: 8.7 X10*3/UL (ref 4.4–11.3)

## 2023-10-25 PROCEDURE — 80202 ASSAY OF VANCOMYCIN: CPT | Performed by: INTERNAL MEDICINE

## 2023-10-25 PROCEDURE — 74230 X-RAY XM SWLNG FUNCJ C+: CPT

## 2023-10-25 PROCEDURE — 36415 COLL VENOUS BLD VENIPUNCTURE: CPT | Performed by: INTERNAL MEDICINE

## 2023-10-25 PROCEDURE — 87185 SC STD ENZYME DETCJ PER NZM: CPT | Mod: WESLAB | Performed by: INTERNAL MEDICINE

## 2023-10-25 PROCEDURE — 1200000002 HC GENERAL ROOM WITH TELEMETRY DAILY

## 2023-10-25 PROCEDURE — 2500000004 HC RX 250 GENERAL PHARMACY W/ HCPCS (ALT 636 FOR OP/ED): Performed by: INTERNAL MEDICINE

## 2023-10-25 PROCEDURE — 97535 SELF CARE MNGMENT TRAINING: CPT | Mod: GO

## 2023-10-25 PROCEDURE — 97161 PT EVAL LOW COMPLEX 20 MIN: CPT | Mod: GP

## 2023-10-25 PROCEDURE — 85027 COMPLETE CBC AUTOMATED: CPT | Performed by: INTERNAL MEDICINE

## 2023-10-25 PROCEDURE — 96372 THER/PROPH/DIAG INJ SC/IM: CPT | Performed by: INTERNAL MEDICINE

## 2023-10-25 PROCEDURE — 2500000001 HC RX 250 WO HCPCS SELF ADMINISTERED DRUGS (ALT 637 FOR MEDICARE OP): Performed by: INTERNAL MEDICINE

## 2023-10-25 PROCEDURE — 97166 OT EVAL MOD COMPLEX 45 MIN: CPT | Mod: GO

## 2023-10-25 PROCEDURE — 80053 COMPREHEN METABOLIC PANEL: CPT | Performed by: INTERNAL MEDICINE

## 2023-10-25 PROCEDURE — 3430000001 HC RX 343 DIAGNOSTIC RADIOPHARMACEUTICALS: Performed by: INTERNAL MEDICINE

## 2023-10-25 PROCEDURE — 2550000001 HC RX 255 CONTRASTS: Performed by: INTERNAL MEDICINE

## 2023-10-25 PROCEDURE — 3490 HC RX 250 GENERAL PHARMACY W/ HCPCS (ALT 636 FOR OP/ED): Performed by: INTERNAL MEDICINE

## 2023-10-25 PROCEDURE — 92611 MOTION FLUOROSCOPY/SWALLOW: CPT | Mod: GN | Performed by: SPEECH-LANGUAGE PATHOLOGIST

## 2023-10-25 PROCEDURE — 97530 THERAPEUTIC ACTIVITIES: CPT | Mod: GP

## 2023-10-25 RX ORDER — VANCOMYCIN 1.5 G/300ML
1500 INJECTION, SOLUTION INTRAVENOUS EVERY 24 HOURS
Status: DISCONTINUED | OUTPATIENT
Start: 2023-10-25 | End: 2023-10-25

## 2023-10-25 RX ADMIN — VANCOMYCIN 750 MG: 750 INJECTION, SOLUTION INTRAVENOUS at 00:06

## 2023-10-25 RX ADMIN — ATORVASTATIN CALCIUM 10 MG: 10 TABLET, FILM COATED ORAL at 20:56

## 2023-10-25 RX ADMIN — PIPERACILLIN SODIUM AND TAZOBACTAM SODIUM 3.38 G: 3; .375 INJECTION, SOLUTION INTRAVENOUS at 02:26

## 2023-10-25 RX ADMIN — FUROSEMIDE 40 MG: 10 INJECTION, SOLUTION INTRAMUSCULAR; INTRAVENOUS at 16:43

## 2023-10-25 RX ADMIN — ACETAMINOPHEN 500 MG: 500 TABLET ORAL at 16:40

## 2023-10-25 RX ADMIN — BARIUM SULFATE 20 ML: 400 SUSPENSION ORAL at 15:39

## 2023-10-25 RX ADMIN — FUROSEMIDE 40 MG: 40 TABLET ORAL at 09:38

## 2023-10-25 RX ADMIN — ENOXAPARIN SODIUM 40 MG: 40 INJECTION SUBCUTANEOUS at 20:56

## 2023-10-25 RX ADMIN — BARIUM SULFATE 25 ML: 400 SUSPENSION ORAL at 15:40

## 2023-10-25 RX ADMIN — FUROSEMIDE 40 MG: 10 INJECTION, SOLUTION INTRAMUSCULAR; INTRAVENOUS at 06:11

## 2023-10-25 RX ADMIN — PIPERACILLIN SODIUM AND TAZOBACTAM SODIUM 3.38 G: 3; .375 INJECTION, SOLUTION INTRAVENOUS at 09:39

## 2023-10-25 RX ADMIN — SENNOSIDES 17.2 MG: 8.6 TABLET, FILM COATED ORAL at 20:56

## 2023-10-25 RX ADMIN — MELOXICAM 7.5 MG: 7.5 TABLET ORAL at 09:37

## 2023-10-25 RX ADMIN — Medication 1 APPLICATION: at 20:56

## 2023-10-25 RX ADMIN — Medication 1 APPLICATION: at 09:36

## 2023-10-25 RX ADMIN — ACETAMINOPHEN 500 MG: 500 TABLET ORAL at 21:44

## 2023-10-25 RX ADMIN — NYSTATIN 1 APPLICATION: 100000 POWDER TOPICAL at 20:56

## 2023-10-25 RX ADMIN — NYSTATIN 1 APPLICATION: 100000 POWDER TOPICAL at 09:43

## 2023-10-25 RX ADMIN — LORATADINE 10 MG: 10 TABLET ORAL at 09:38

## 2023-10-25 RX ADMIN — ENOXAPARIN SODIUM 40 MG: 40 INJECTION SUBCUTANEOUS at 09:39

## 2023-10-25 RX ADMIN — ACETAMINOPHEN 500 MG: 500 TABLET ORAL at 06:11

## 2023-10-25 RX ADMIN — VANCOMYCIN 1500 MG: 1.5 INJECTION, SOLUTION INTRAVENOUS at 12:20

## 2023-10-25 RX ADMIN — CLOPIDOGREL BISULFATE 75 MG: 75 TABLET ORAL at 09:37

## 2023-10-25 RX ADMIN — CYANOCOBALAMIN TAB 500 MCG 500 MCG: 500 TAB at 09:39

## 2023-10-25 RX ADMIN — BARIUM SULFATE 75 ML: 980 POWDER, FOR SUSPENSION ORAL at 15:38

## 2023-10-25 RX ADMIN — SENNOSIDES 17.2 MG: 8.6 TABLET, FILM COATED ORAL at 09:38

## 2023-10-25 RX ADMIN — BARIUM SULFATE 25 ML: 400 PASTE ORAL at 15:37

## 2023-10-25 ASSESSMENT — ACTIVITIES OF DAILY LIVING (ADL)
HOME_MANAGEMENT_TIME_ENTRY: 34
FEEDING YOURSELF: INDEPENDENT
ASSISTIVE_DEVICE: WALKER
GROOMING: NEEDS ASSISTANCE
HEARING - RIGHT EAR: FUNCTIONAL
LACK_OF_TRANSPORTATION: NO
HEARING - LEFT EAR: FUNCTIONAL
PATIENT'S MEMORY ADEQUATE TO SAFELY COMPLETE DAILY ACTIVITIES?: YES
ADEQUATE_TO_COMPLETE_ADL: YES
BATHING: NEEDS ASSISTANCE
ADL_ASSISTANCE: NEEDS ASSISTANCE
TOILETING: NEEDS ASSISTANCE
DRESSING YOURSELF: NEEDS ASSISTANCE
JUDGMENT_ADEQUATE_SAFELY_COMPLETE_DAILY_ACTIVITIES: YES
BATHING_ASSISTANCE: MAXIMAL
WALKS IN HOME: INDEPENDENT

## 2023-10-25 ASSESSMENT — COGNITIVE AND FUNCTIONAL STATUS - GENERAL
PERSONAL GROOMING: A LOT
TOILETING: TOTAL
WALKING IN HOSPITAL ROOM: A LOT
DRESSING REGULAR LOWER BODY CLOTHING: A LOT
TURNING FROM BACK TO SIDE WHILE IN FLAT BAD: A LOT
MOVING FROM LYING ON BACK TO SITTING ON SIDE OF FLAT BED WITH BEDRAILS: A LOT
EATING MEALS: A LITTLE
DAILY ACTIVITIY SCORE: 15
DAILY ACTIVITIY SCORE: 11
PERSONAL GROOMING: A LITTLE
EATING MEALS: A LITTLE
DRESSING REGULAR LOWER BODY CLOTHING: A LOT
MOVING FROM LYING ON BACK TO SITTING ON SIDE OF FLAT BED WITH BEDRAILS: A LOT
TURNING FROM BACK TO SIDE WHILE IN FLAT BAD: A LOT
HELP NEEDED FOR BATHING: A LOT
MOVING TO AND FROM BED TO CHAIR: A LOT
CLIMB 3 TO 5 STEPS WITH RAILING: TOTAL
MOBILITY SCORE: 10
MOBILITY SCORE: 11
HELP NEEDED FOR BATHING: A LOT
DRESSING REGULAR UPPER BODY CLOTHING: A LOT
TURNING FROM BACK TO SIDE WHILE IN FLAT BAD: A LOT
DRESSING REGULAR UPPER BODY CLOTHING: TOTAL
CLIMB 3 TO 5 STEPS WITH RAILING: TOTAL
MOVING TO AND FROM BED TO CHAIR: A LOT
WALKING IN HOSPITAL ROOM: TOTAL
MOBILITY SCORE: 11
PERSONAL GROOMING: A LITTLE
DRESSING REGULAR LOWER BODY CLOTHING: TOTAL
HELP NEEDED FOR BATHING: TOTAL
DRESSING REGULAR UPPER BODY CLOTHING: A LOT
PATIENT BASELINE BEDBOUND: NO
DAILY ACTIVITIY SCORE: 11
TOILETING: TOTAL
WALKING IN HOSPITAL ROOM: A LOT
STANDING UP FROM CHAIR USING ARMS: A LOT
CLIMB 3 TO 5 STEPS WITH RAILING: TOTAL
STANDING UP FROM CHAIR USING ARMS: A LOT
STANDING UP FROM CHAIR USING ARMS: A LOT
MOVING FROM LYING ON BACK TO SITTING ON SIDE OF FLAT BED WITH BEDRAILS: A LOT
TOILETING: A LOT
MOVING TO AND FROM BED TO CHAIR: A LOT

## 2023-10-25 ASSESSMENT — PAIN - FUNCTIONAL ASSESSMENT
PAIN_FUNCTIONAL_ASSESSMENT: 0-10
PAIN_FUNCTIONAL_ASSESSMENT: FLACC (FACE, LEGS, ACTIVITY, CRY, CONSOLABILITY)
PAIN_FUNCTIONAL_ASSESSMENT: 0-10

## 2023-10-25 ASSESSMENT — PAIN SCALES - GENERAL
PAINLEVEL_OUTOF10: 4
PAINLEVEL_OUTOF10: 0 - NO PAIN
PAINLEVEL_OUTOF10: 0 - NO PAIN
PAINLEVEL_OUTOF10: 2
PAINLEVEL_OUTOF10: 0 - NO PAIN

## 2023-10-25 NOTE — PROGRESS NOTES
Vancomycin Dosing by Pharmacy- Cessation of Therapy    Consult to pharmacy for vancomycin dosing has been discontinued by the prescriber, pharmacy will sign off at this time.    Please call pharmacy if there are further questions or re-enter a consult if vancomycin is resumed.     Luis Alberto Mendez, PharmD

## 2023-10-25 NOTE — H&P
History Of Present Illness  Amrita Lopez is a 82 y.o. female presenting with redness, swelling and cellulitis of bilateral lower extremity..  Patient states that her symptoms diabetes mellitus admission.  Patient treated with IV oral antibiotics.  Patient improved.  Continue current meds.  With this complaint she has been sent to the emergency room.  In the emergency room initial work-up done and patient has been admitted to the floor for further management.     Past Medical History  Seasonal allergies  , Hypertension, history of CVA, hyperlipidemia, morbid obesity and lymphedema    Surgical History  Past Surgical History:   Procedure Laterality Date    MR HEAD ANGIO WO IV CONTRAST  11/3/2020    MR HEAD ANGIO WO IV CONTRAST [facility]    MR HEAD ANGIO WO IV CONTRAST  3/29/2021    MR HEAD ANGIO WO IV CONTRAST [facility]    MR NECK ANGIO WO IV CONTRAST  3/30/2021    MR NECK ANGIO WO IV CONTRAST [facility]        Social History  She reports that she has been smoking cigarettes. She has been smoking an average of .5 packs per day. She has never used smokeless tobacco. Alcohol use questions deferred to the physician. She reports that she does not use drugs.    Family History  Hypertension     Allergies  Nickel    Review of Systems  I reviewed all systems reviewed as above otherwise is negative.  Physical Exam  Examination: Patient morbidly beast, lying in the bed, comfortable did not look in acute distress.  HEENT:  Head externally atraumatic, no pallor, no icterus, extraocular movements intact, pupils reactive to light, oral mucosa moist and throat clear.  Neck:  Supple, no JVP, no palpable adenopathy or thyromegaly.  No carotid bruit.  Chest:  Clear to auscultation and resonant.  Heart:  Regular rate and rhythm, no murmur or gallop could be appreciated.  Abdomen:  Soft, nontender, bowel sounds present, normoactive, no palpable hepatosplenomegaly.  Extremities:  No edema, pulses present,  "no cyanosis or clubbing.  CNS:  Patient alert, oriented to time, place and person.  Power 5/5 all over and deep tendon reflexes symmetrical, cranial nerves 2-12 grossly intact.  Skin: Acute edema and cellulitis of the bilateral lower extremity.  Left worse than the right.  The denuded skin on the left leg.  Patient has some serosanguineous discharge.  Musculoskeletal:  No joint swelling or erythema, range of movement normal.  Last Recorded Vitals  Heart Rate:  [71-93]   Temp:  [36.1 °C (97 °F)-36.9 °C (98.4 °F)]   Resp:  [14-18]   BP: (133-156)/()   Height:  [162.6 cm (5' 4\")]   Weight:  [132 kg (290 lb)]   SpO2:  [66 %-100 %]       Relevant Results        Results for orders placed or performed during the hospital encounter of 10/24/23 (from the past 24 hour(s))   CBC and Auto Differential   Result Value Ref Range    WBC 11.5 (H) 4.4 - 11.3 x10*3/uL    nRBC 0.0 0.0 - 0.0 /100 WBCs    RBC 4.22 4.00 - 5.20 x10*6/uL    Hemoglobin 13.2 12.0 - 16.0 g/dL    Hematocrit 41.4 36.0 - 46.0 %    MCV 98 80 - 100 fL    MCH 31.3 26.0 - 34.0 pg    MCHC 31.9 (L) 32.0 - 36.0 g/dL    RDW 13.5 11.5 - 14.5 %    Platelets 275 150 - 450 x10*3/uL    MPV 9.4 7.5 - 11.5 fL    Neutrophils % 56.1 40.0 - 80.0 %    Immature Granulocytes %, Automated 0.3 0.0 - 0.9 %    Lymphocytes % 28.0 13.0 - 44.0 %    Monocytes % 7.6 2.0 - 10.0 %    Eosinophils % 7.3 0.0 - 6.0 %    Basophils % 0.7 0.0 - 2.0 %    Neutrophils Absolute 6.43 (H) 1.60 - 5.50 x10*3/uL    Immature Granulocytes Absolute, Automated 0.04 0.00 - 0.50 x10*3/uL    Lymphocytes Absolute 3.21 (H) 0.80 - 3.00 x10*3/uL    Monocytes Absolute 0.87 (H) 0.05 - 0.80 x10*3/uL    Eosinophils Absolute 0.84 (H) 0.00 - 0.40 x10*3/uL    Basophils Absolute 0.08 0.00 - 0.10 x10*3/uL   Comprehensive metabolic panel   Result Value Ref Range    Glucose 104 (H) 65 - 99 mg/dL    Sodium 138 133 - 145 mmol/L    Potassium 4.5 3.4 - 5.1 mmol/L    Chloride 101 97 - 107 mmol/L    Bicarbonate 30 24 - 31 mmol/L    " Urea Nitrogen 20 8 - 25 mg/dL    Creatinine 0.80 0.40 - 1.60 mg/dL    eGFR 75 >60 mL/min/1.73m*2    Calcium 9.5 8.5 - 10.4 mg/dL    Albumin 3.9 3.5 - 5.0 g/dL    Alkaline Phosphatase 93 35 - 125 U/L    Total Protein 6.4 5.9 - 7.9 g/dL    AST 11 5 - 40 U/L    Bilirubin, Total 0.3 0.1 - 1.2 mg/dL    ALT 8 5 - 40 U/L    Anion Gap 7 <=19 mmol/L   Urinalysis with Reflex Microscopic   Result Value Ref Range    Color, Urine Colorless (N) Light-Yellow, Yellow, Dark-Yellow    Appearance, Urine Clear Clear    Specific Gravity, Urine 1.016 1.005 - 1.035    pH, Urine 6.0 5.0, 5.5, 6.0, 6.5, 7.0, 7.5, 8.0    Protein, Urine NEGATIVE NEGATIVE, 10 (TRACE), 20 (TRACE) mg/dL    Glucose, Urine Normal Normal mg/dL    Blood, Urine NEGATIVE NEGATIVE    Ketones, Urine NEGATIVE NEGATIVE mg/dL    Bilirubin, Urine NEGATIVE NEGATIVE    Urobilinogen, Urine Normal Normal mg/dL    Nitrite, Urine NEGATIVE NEGATIVE    Leukocyte Esterase, Urine NEGATIVE NEGATIVE     Prior to Admission medications    Medication Sig Start Date End Date Taking? Authorizing Provider   acetaminophen (Tylenol) 325 mg tablet Take 2 tablets (650 mg) by mouth every 6 hours if needed for mild pain (1 - 3), headaches or fever (temp greater than 38.0 C).    Historical Provider, MD   acetaminophen (Tylenol) 500 mg tablet Take 1 tablet (500 mg) by mouth every 8 hours.    Historical Provider, MD   albuterol 108 (90 Base) MCG/ACT inhaler Inhale 2 puffs every 2 hours if needed for wheezing or shortness of breath. 3/21/23   Historical Provider, MD   ammonium lactate (Lac-Hydrin) 12 % lotion Apply 1 Application topically 2 times a day. 5/4/23   Historical Provider, MD   atorvastatin (Lipitor) 10 mg tablet Take 1 tablet (10 mg) by mouth once daily.    Historical Provider, MD   clopidogrel (Plavix) 75 mg tablet Take 1 tablet (75 mg) by mouth once daily.    Historical Provider, MD   cyanocobalamin (Vitamin B-12) 1,000 mcg tablet Take 0.5 tablets (500 mcg) by mouth once daily.     Historical Provider, MD   furosemide (Lasix) 40 mg tablet Take 1 tablet (40 mg) by mouth once daily. 3/8/23   Historical Provider, MD   loratadine (Claritin) 10 mg tablet Take 1 tablet (10 mg) by mouth once daily.    Historical Provider, MD   magnesium hydroxide (Milk of Magnesia) 2,400 mg/10 mL suspension suspension Take 30 mL by mouth once daily as needed for constipation.    Historical Provider, MD   meloxicam (Mobic) 7.5 mg tablet Take 1 tablet (7.5 mg) by mouth once daily. 9/19/23   Historical Provider, MD   nystatin (Mycostatin) 100,000 unit/gram powder Apply 1 Application topically 2 times a day. 7/26/23   Historical Provider, MD       Current Facility-Administered Medications:     acetaminophen (Tylenol) tablet 650 mg, 650 mg, oral, q4h PRN **OR** acetaminophen (Tylenol) oral liquid 650 mg, 650 mg, oral, q4h PRN **OR** acetaminophen (Tylenol) suppository 650 mg, 650 mg, rectal, q4h PRN, Arnoldo Sanchez MD    acetaminophen (Tylenol) tablet 500 mg, 500 mg, oral, q8h ANNIE, Arnoldo Sanchez MD, 500 mg at 10/24/23 2330    acetaminophen (Tylenol) tablet 650 mg, 650 mg, oral, q6h PRN, Arnoldo Sanchez MD    albuterol 2.5 mg /3 mL (0.083 %) nebulizer solution 2.5 mg, 2.5 mg, nebulization, q2h PRN, Arnoldo Sanchez MD    ammonium lactate (Lac-Hydrin) 12 % lotion 1 Application, 1 Application, Topical, BID, Arnoldo Sanchez MD, 1 Application at 10/24/23 2331    atorvastatin (Lipitor) tablet 10 mg, 10 mg, oral, Nightly, Arnoldo Sanchez MD, 10 mg at 10/24/23 2329    benzocaine-menthol (Cepastat Sore Throat) 15-3.6 mg lozenge 1 lozenge, 1 lozenge, Mouth/Throat, q2h PRN, Arnoldo Sanchez MD    clopidogrel (Plavix) tablet 75 mg, 75 mg, oral, Daily, Arnoldo Sanchez MD    cyanocobalamin (Vitamin B-12) tablet 500 mcg, 500 mcg, oral, Daily, Arnoldo Sanchez MD    dextromethorphan-guaifenesin (Robitussin DM)  mg/5 mL oral liquid 5 mL, 5 mL, oral, q4h PRN, Arnoldo Sanchez MD    enoxaparin  (Lovenox) syringe 40 mg, 40 mg, subcutaneous, q12h ANNIE, Arnoldo Sanchez MD, 40 mg at 10/24/23 2329    furosemide (Lasix) injection 40 mg, 40 mg, intravenous, q8h, Arnoldo Sanchez MD, 40 mg at 10/24/23 2330    furosemide (Lasix) tablet 40 mg, 40 mg, oral, Daily, Arnoldo Sanchez MD    guaiFENesin (Mucinex) 12 hr tablet 600 mg, 600 mg, oral, q12h PRN, Arnoldo Sanchez MD    loratadine (Claritin) tablet 10 mg, 10 mg, oral, Daily, Arnoldo Sanchez MD    magnesium hydroxide (Milk of Magnesia) 2,400 mg/10 mL suspension 30 mL, 30 mL, oral, Daily PRN, Arnoldo Sanchez MD    meloxicam (Mobic) tablet 7.5 mg, 7.5 mg, oral, Daily, Arnoldo Sanchez MD    nystatin (Mycostatin) 100,000 unit/gram powder 1 Application, 1 Application, Topical, BID, Arnoldo Sanchez MD, 1 Application at 10/24/23 2355    ondansetron ODT (Zofran-ODT) disintegrating tablet 4 mg, 4 mg, oral, q8h PRN **OR** ondansetron (Zofran) injection 4 mg, 4 mg, intravenous, q8h PRN, Arnoldo Sanchez MD    piperacillin-tazobactam-dextrose (Zosyn) IV 3.375 g, 3.375 g, intravenous, q6h, Arnoldo Sanchez MD    polyethylene glycol (Glycolax, Miralax) packet 17 g, 17 g, oral, Daily PRN, Arnoldo Sanchez MD    sennosides (Senokot) tablet 17.2 mg, 2 tablet, oral, BID, Arnoldo Sanchez MD, 17.2 mg at 10/24/23 2330    vancomycin-diluent combo no.1 (Xellia) IVPB 750 mg, 750 mg, intravenous, q12h, Arnoldo Sanchez MD, Last Rate: 200 mL/hr at 10/25/23 0006, 750 mg at 10/25/23 0006  No results found.  No results found for the last 90 days.       Assessment/Plan   Principal Problem:    Cellulitis of lower extremity, unspecified laterality  Lymphedema  Hypertension  Hyperlipidemia  History of CVA    Patient has cellulitis.  Patient started on IV antibiotic.  Consult ID.  Get supportive care.  Give the patient IV Lasix for leg edema.  Monitor electrolytes.  Replete as needed.  Monitor input output and daily weight.  We will take DVT, fall,  aspiration, and decubitus precautions this has been discussed the patient in acute care.          Arnoldo Sanchez MD

## 2023-10-25 NOTE — PROCEDURES
Speech-Language Pathology    SLP Inpatient MBSS/FEES Evaluation    Patient Name: Amrita Lopez  MRN: [id number]  Today's Date: 10/25/2023   Time Calculation  Start Time: 1450  Stop Time: 1515  Time Calculation (min): 25 min         Current Problem:   1. Cellulitis of lower extremity, unspecified laterality        2. Diastolic hypertension        Principal Problem:    Cellulitis of lower extremity, unspecified laterality  Lymphedema  Hypertension  Hyperlipidemia  History of CVA      Recommendations/Treatment:   Recommendations: Continue skilled dysphagia services  Solid Consistency: Regular (IDDSI Level 7)  Liquid Consistency: Thin (IDDSI Level 0)    Compensatory Strategy Recommendations: Single sips, Slow rate, Small sips  Postural Changes and/or Swallow Maneuvers: Upright 90 degrees for all PO intake      Assessment/Impression: Mild oropharyngeal stage dysphagia, with laryngeal penetration above folds, d/t delayed pharyngeal swallow initiation and decreased airway closure ejected, low risk for aspiration          Prognosis: Good         Plan:  Treatment/Interventions: Patient/family education, Assess diet tolerance  SLP Plan: Skilled SLP  SLP Frequency: 1x per week  SLP Discharge Recommendations: Other (Comment)  Diet Recommendations: Solid, Liquid  Solid Consistency: Regular (IDDSI Level 7)  Liquid Consistency: Thin (IDDSI Level 0)  Next Treatment Priority: diet tolerance, swallow strategies  Discussed POC: Patient, Nursing  Discussed Risks/Benefits: Yes, Patient, Nursing  Patient/Caregiver Agreeable: Yes  Dysphagia Goals:   Pt will:  Tolerate regular solids and thin liquids without s/s aspiration  Utilize safe swallow strategies with 90% acc  to reduce aspiration risk     Subjective   Pt seated in cart for MBS, Alert, cooperative. Pt assessed with thin, nectar, honey, pudding barium and cracker coated with puddingbarium     Oral Phase: mild impairment  -Bolus head @ valleculae with thin, nectar and honey,  pyriforms with pudding and solid           Pharyngeal Phase: mild impairment  -Partial anterior hyoid excursion  -Partial eppiglottic inversion     Structural abnormality: cricopharyngeal bar/hypertrophy           Rosenbeck: narrow column thin contrast enters airway above folds, ejected    Esophageal Phase: unremarkable                                   Inpatient:  Education Documentation  Pt/nursing staff educated on results of MBS, recommendations, and safe swallow strategies, ....P and RN able to able to verbalize understanding and agreement,  pt requires reinforcement/assistance from staff/caregivers

## 2023-10-25 NOTE — PROGRESS NOTES
Physical Therapy    Physical Therapy Evaluation & Treatment    Patient Name: Amrita Lopez  MRN: [id number]  Today's Date: 10/25/2023   Time Calculation  Start Time: 0936  Stop Time: 1020  Time Calculation (min): 44 min    Assessment/Plan   PT Assessment  PT Assessment Results: Decreased strength, Decreased endurance, Impaired balance, Decreased mobility, Decreased coordination, Decreased safety awareness  Rehab Prognosis: Good  Evaluation/Treatment Tolerance: Patient limited by fatigue  End of Session Communication: Bedside nurse  End of Session Patient Position: Bed, 4 rail up, Alarm on  IP OR SWING BED PT PLAN  Inpatient or Swing Bed: Inpatient  PT Plan  Treatment/Interventions: Bed mobility, Transfer training, Gait training, Balance training, Strengthening, Endurance training, Therapeutic exercise, Therapeutic activity  PT Plan: Skilled PT  PT Frequency: 3 times per week  PT Discharge Recommendations: Moderate intensity level of continued care  PT Recommended Transfer Status: Assist x2  PT - OK to Discharge: Yes (with skilled physical therapy services at next level of care.)      Subjective     General Visit Information:  General  Reason for Referral: Impaired mobility;  cellulitis alonso LE  Past Medical History Relevant to Rehab: HTN, CVA, hyperlipid, lymphedema,  Co-Treatment: OT  Co-Treatment Reason: Need for 2nd skilled person for therapeutic patient handling and safety  Prior to Session Communication: Bedside nurse  Patient Position Received: Bed, 4 rail up  General Comment: RN cleared patient for treatment.  Patient reports agreeable to treatment.  Home Living:  Home Living  Type of Home: Assisted living  Prior Level of Function:  Prior Function Per Pt/Caregiver Report  Ambulatory Assistance: Needs assistance  Transfers: Moderate  Gait: Moderate  Prior Function Comments: Unable to accurately assess prior level of function due to patient's impaired memory (Patient reports uses wheelchair at assisted living;   patient states sleeps in recliner?)  Precautions:  Precautions  Medical Precautions: Fall precautions (weight 290#)  Vital Signs:       Objective   Pain:  Pain Assessment  Pain Assessment: FLACC (Face, Legs, Activity, Cry, Consolability)  Pain Score: 2 (RN notified)  Cognition:  Cognition  Overall Cognitive Status: Impaired (disoriented to place and time)    General Assessments:  General Observation  General Observation: Edema alonso LE;  bandage left LE ankle             Activity Tolerance  Endurance: Decreased tolerance for upright activites    Sensation  Light Touch: Partial deficits in the LLE (patient reports numbness left LE)           Coordination  Movements are Fluid and Coordinated: No  Lower Body Coordination: slow rate of movement alonso LE         Static Sitting Balance  Static Sitting-Balance Support: Bilateral upper extremity supported  Static Sitting-Level of Assistance: Minimum assistance  Static Sitting-Comment/Number of Minutes: Assist with trunk support while sitting on side of bed. (Patient experienced single episode of loss of balance posterior while sitting on side of bed requiring assist to correct.)    Static Standing Balance  Static Standing-Balance Support: Bilateral upper extremity supported  Static Standing-Level of Assistance: Moderate assistance  Static Standing-Comment/Number of Minutes: Assist with trunk support while using rolling walker to stand  Functional Assessments:  Bed Mobility  Bed Mobility: Yes  Bed Mobility 1  Bed Mobility 1: Supine to sitting  Level of Assistance 1: Maximum assistance  Bed Mobility Comments 1: Assist with trunk-up and alonso LE during supine to sit;  max assist to scoot hips to edge of bed during supine to sit.  Bed Mobility 2  Bed Mobility  2: Sitting to supine  Level of Assistance 2: Maximum assistance (x 2 persons)  Bed Mobility Comments 2: Assist with trunk and alonso LE during sit to supine.  Bed Mobility 3  Bed Mobility 3: Rolling right, Rolling left  Level of  Assistance 3: Maximum assistance  Bed Mobility Comments 3: Assist at shoulders and hips to roll side to side    Transfers  Transfer: Yes  Transfer 1  Transfer From 1: Sit to  Transfer to 1: Stand  Technique 1: Sit to stand  Transfer Device 1: Walker  Transfer Level of Assistance 1: Maximum assistance  Trials/Comments 1: Assist with trunk support during sit to stand from bed;  patient was allowed to pull up from stabilized walker during sit to stand.  Transfers 2  Transfer From 2: Stand to  Transfer to 2: Sit  Technique 2: Stand to sit  Transfer Device 2: Walker  Transfer Level of Assistance 2: Maximum assistance  Trials/Comments 2: Assist with trunk during stand to sit.    Ambulation/Gait Training  Ambulation/Gait Training Performed: No    Stairs  Stairs: No  Extremity/Trunk Assessments:  RLE   RLE : Exceptions to WFL  Strength RLE  R Hip Flexion: 2/5  R Knee Extension: 3-/5  R Ankle Dorsiflexion: 3-/5  LLE   LLE : Exceptions to WFL  Strength LLE  L Hip Flexion: 2/5  L Knee Extension: 3-/5  L Ankle Dorsiflexion: 3-/5  Treatments:            Bed Mobility  Bed Mobility: Yes  Bed Mobility 1  Bed Mobility 1: Supine to sitting  Level of Assistance 1: Maximum assistance  Bed Mobility Comments 1: Assist with trunk-up and alonso LE during supine to sit;  max assist to scoot hips to edge of bed during supine to sit.  Bed Mobility 2  Bed Mobility  2: Sitting to supine  Level of Assistance 2: Maximum assistance (x 2 persons)  Bed Mobility Comments 2: Assist with trunk and alonso LE during sit to supine.  Bed Mobility 3  Bed Mobility 3: Rolling right, Rolling left  Level of Assistance 3: Maximum assistance  Bed Mobility Comments 3: Assist at shoulders and hips to roll side to side    Ambulation/Gait Training  Ambulation/Gait Training Performed: No  Transfers  Transfer: Yes  Transfer 1  Transfer From 1: Sit to  Transfer to 1: Stand  Technique 1: Sit to stand  Transfer Device 1: Walker  Transfer Level of Assistance 1: Maximum  assistance  Trials/Comments 1: Assist with trunk support during sit to stand from bed;  patient was allowed to pull up from stabilized walker during sit to stand.  Transfers 2  Transfer From 2: Stand to  Transfer to 2: Sit  Technique 2: Stand to sit  Transfer Device 2: Walker  Transfer Level of Assistance 2: Maximum assistance  Trials/Comments 2: Assist with trunk during stand to sit.    Stairs  Stairs: No    Other Activity  Other Activity Performed: Yes  Other Activity 1: Mod assist x 2 for trunk support during static standing with heavy duty rolling walker x ~3 minutes;  patient appeared to support body weight with alonso LE in standing;  patient stands with flexion at trunk and hips.  Outcome Measures:  [facility] Basic Mobility  Turning from your back to your side while in a flat bed without using bedrails: A lot  Moving from lying on your back to sitting on the side of a flat bed without using bedrails: A lot  Moving to and from bed to chair (including a wheelchair): A lot  Standing up from a chair using your arms (e.g. wheelchair or bedside chair): A lot  To walk in hospital room: Total  Climbing 3-5 steps with railing: Total  Basic Mobility - Total Score: 10    Encounter Problems       Encounter Problems (Active)       PT Problem       Patient will ambulate 5 distance using walker with moderate assist (Progressing)       Start:  10/25/23    Expected End:  11/14/23         Goal Note       Patient will ambulate 5 distance using walker with moderate assist              Patient will perform chair to and from bed transfer with moderate assist (Progressing)       Start:  10/25/23    Expected End:  11/14/23         Goal Note       Patient will perform chair to and from bed transfer with moderate assist              Patient will perform supine to sit on bed with moderate assist demonstrating control (Progressing)       Start:  10/25/23    Expected End:  11/14/23         Goal Note       Patient will perform supine to sit on  bed with moderate assist demonstrating control              Patient will perform sit to supine on bed with moderate assist demonstrating control (Progressing)       Start:  10/25/23    Expected End:  11/14/23         Goal Note       Patient will perform sit to supine on bed with moderate assist demonstrating control                     Education Documentation  Mobility Training, taught by Bassam Kearns, PT at 10/25/2023 10:47 AM.  Learner: Patient  Readiness: Acceptance  Method: Explanation, Demonstration  Response: Needs Reinforcement    Education Comments  No comments found.

## 2023-10-25 NOTE — PROGRESS NOTES
Occupational Therapy    Evaluation/Treatment    Patient Name: Amrita Lopez  MRN: [id number]  : 1940  Today's Date: 10/25/23  Time Calculation  Start Time: 936  Stop Time: 1020  Time Calculation (min): 44 min       Assessment:  OT Assessment: OT order received, chart reviewed, evaluation completed. Pt demonstrated significant deficits for ADLs, functional transfers, mobility and cognition and would benefit from acute OT services.  Prognosis: Good  Evaluation/Treatment Tolerance: Patient limited by fatigue  Medical Staff Made Aware: Yes  End of Session Communication: Bedside nurse  End of Session Patient Position: Bed, 4 rail up, Alarm on  OT Assessment Results: Decreased ADL status, Decreased upper extremity range of motion, Decreased upper extremity strength, Decreased safe judgment during ADL, Decreased cognition, Decreased endurance, Decreased functional mobility, Decreased IADLs  Prognosis: Good  Evaluation/Treatment Tolerance: Patient limited by fatigue  Medical Staff Made Aware: Yes  Strengths: Attitude of self  Barriers to Participation: Comorbidities  Plan:  Treatment Interventions: ADL retraining, Functional transfer training, UE strengthening/ROM, Endurance training, Cognitive reorientation, Patient/family training  OT Frequency: 3 times per week  OT Discharge Recommendations: Moderate intensity level of continued care  OT Recommended Transfer Status: Maximum assist, Assist of 2  OT - OK to Discharge: Yes  Treatment Interventions: ADL retraining, Functional transfer training, UE strengthening/ROM, Endurance training, Cognitive reorientation, Patient/family training    Subjective   Current Problem:  1. Cellulitis of lower extremity, unspecified laterality        2. Diastolic hypertension          General:   OT Received On: 10/25/23  General  Reason for Referral: activities of daily living  Past Medical History Relevant to Rehab: HTN, CVA, hyperlipid, lymphedema,  Co-Treatment: PT  Co-Treatment  Reason: Need for 2nd skilled person for therapeutic patient handling and safety and heavy level of assist  Prior to Session Communication: Bedside nurse  Patient Position Received: Bed, 4 rail up  General Comment: Pt is an 83 yo woman admit from assisted living facility with B LEs redness.  Precautions:  Medical Precautions: Fall precautions (290 LBS)  Vital Signs:     Pain:  Pain Assessment  Pain Assessment: 0-10  Pain Score: 0 - No pain    Objective   Cognition:  Overall Cognitive Status: Impaired  Cognition Comments: Pt appears to have memory impairments and limited insight  Memory:  (impaired)  Safety/Judgement:  (impaired)  Insight: Mild  Processing Speed: Delayed           Home Living:  Type of Home: Assisted living  Lives With:  (carestaff)  Home Adaptive Equipment: Wheelchair-manual  Home Layout: One level  Prior Function:  Level of [place]: Needs assistance with ADLs, Needs assistance with functional transfers  Receives Help From: Primary caregiver  ADL Assistance: Needs assistance  Ambulatory Assistance: Needs assistance  Transfers: Moderate  Gait: Moderate (reports uses a wheelchair for distance)  Prior Function Comments: Unable to accurately assess prior level of function due to patient's impaired memory  IADL History:     ADL:  Eating Deficit: Setup  Grooming Deficit: Setup, Wash/dry hands, Wash/dry face  Bathing Assistance: Maximal  UE Dressing Assistance: Maximal  LE Dressing Assistance: Total  LE Dressing Deficit: Don/doff R sock, Don/doff L sock  Toileting Assistance with Device: Total  Activities of Daily Living: Grooming  Grooming Level of Assistance: Setup  Grooming Where Assessed: Edge of bed  Grooming Comments: hand hygiene    LE Dressing  LE Dressing: Yes  Sock Level of Assistance: Dependent  LE Dressing Where Assessed: Edge of bed    Toileting  Toileting Level of Assistance: Dependent  Where Assessed: Bed level, Other (Comment) (standing)  Toileting Comments: Pt incontinent of large BM,  pt able to stand with max A for initial dep BM hygiene, due to body habitus required return to supine, dep BM hygiene x2 assist rolling R, L for extensive hygiene  Activity Tolerance:  Endurance: Decreased tolerance for upright activites (severe coughing s/p drinking water with RN present, speech therapy contacted per RN)  Functional Standing Tolerance:     Bed Mobility/Transfers: Bed Mobility  Bed Mobility: Yes  Bed Mobility 1  Bed Mobility 1: Supine to sitting  Level of Assistance 1: Maximum assistance (x2)  Bed Mobility Comments 1: Pt required max A x2 for trunk up and B LEs to EOB, dep scoot to EOB with use of draw sheet  Bed Mobility 2  Bed Mobility  2: Sitting to supine, Rolling right, Rolling left  Level of Assistance 2: Maximum assistance (x2)  Bed Mobility Comments 2: Assist for B LEs and trunk into supine, dep for rolling R, L multiple times for hygiene, dep scoot to HOB with bed in trendelenburg remained with needs in reach seated upright in bed.    Transfers  Transfer: Yes  Transfer 1  Transfer From 1: Sit to  Transfer to 1: Stand  Technique 1: Sit to stand  Transfer Device 1: Walker  Transfer Level of Assistance 1: Maximum assistance  Trials/Comments 1: Pt stood from bed with max A x2 anr HD RW support. Pt   able to tolerate standing x5 mins for extensive BM hygiene.  Transfers 2  Transfer From 2: Stand to  Transfer to 2: Sit  Technique 2: Stand to sit  Transfer Device 2: Walker  Transfer Level of Assistance 2: Maximum assistance, +2      Vision:Vision - Basic Assessment  Current Vision: No visual deficits  Sensation:  Light Touch: No apparent deficits  Strength:  Strength Comments: L sided weakness, appears to be chronic  Other Activity:     Home Environment:     Perception:  Inattention/Neglect: Appears intact  Coordination:  Movements are Fluid and Coordinated: No  Upper Body Coordination: delayed rate of movements   Hand Function:  Hand Function  Gross Grasp: Functional  Coordination:  Functional  Extremities: RUE   RUE : Within Functional Limits and LUE   LUE:  (weakness compared to R appears to be chronic)    Outcome Measures: [facility] Daily Activity  Putting on and taking off regular lower body clothing: Total  Bathing (including washing, rinsing, drying): Total  Putting on and taking off regular upper body clothing: A lot  Toileting, which includes using toilet, bedpan or urinal: Total  Taking care of personal grooming such as brushing teeth: A little  Eating Meals: A little  Daily Activity - Total Score: 11      Education Documentation  ADL Training, taught by Tiffany Tam OT at 10/25/2023 12:06 PM.  Learner: Patient  Readiness: Acceptance  Method: Explanation  Response: Verbalizes Understanding  Comment: Educated on POC    Education Comments  No comments found.        OP EDUCATION:       Goals:  Encounter Problems       Encounter Problems (Active)       OT Goals       Pt will perform functional mobility short household distance at min A level with HDRW (Progressing)       Start:  10/25/23    Expected End:  11/10/23            Pt will complete ADL tasks with Min A using AE as needed, in order to complete self-care tasks.  (Progressing)       Start:  10/25/23    Expected End:  11/10/23            Pt will perform functional transfers at min A level with RW  (Progressing)       Start:  10/25/23    Expected End:  11/10/23

## 2023-10-25 NOTE — PROGRESS NOTES
"Vancomycin Dosing by Pharmacy- INITIAL    Amrita Lopez is a 82 y.o. year old female who Pharmacy has been consulted for vancomycin dosing for cellulitis, skin and soft tissue. Based on the patient's indication and renal status this patient will be dosed based on a goal AUC of 400-600.     Renal function is currently stable.    Visit Vitals  /70 (BP Location: Left arm, Patient Position: Lying)   Pulse 71   Temp 36.8 °C (98.2 °F) (Oral)   Resp 18        Lab Results   Component Value Date    CREATININE 0.80 10/24/2023    CREATININE 0.8 12/08/2022    CREATININE 0.6 06/09/2021    CREATININE 0.8 06/08/2021    CREATININE 0.6 03/30/2021        Patient weight is No results found for: \"PTWEIGHT\"    No results found for: \"CULTURE\"     No intake/output data recorded.  [unfilled]    No results found for: \"PATIENTTEMP\"       Assessment/Plan     Patient will not be given a loading dose.  Will initiate vancomycin maintenance,  750 mg every 12 hours.    This dosing regimen is predicted by [employer] to result in the following pharmacokinetic parameters:  Loading dose: N/A  Regimen: 750 mg IV every 12 hours.  Start time: 23:03 on 10/24/2023  Exposure target: AUC24 (range)400-600 mg/L.hr   AUC24,ss: 515 mg/L.hr  Probability of AUC24 > 400: 70 %  Ctrough,ss: 16.4 mg/L  Probability of Ctrough,ss > 20: 37 %  Probability of nephrotoxicity (Lodise JASON 2009): 12 %    Follow-up level will be ordered on 10/25 with am labs unless clinically indicated sooner.  Will continue to monitor renal function daily while on vancomycin and order serum creatinine at least every 48 hours if not already ordered.  Follow for continued vancomycin needs, clinical response, and signs/symptoms of toxicity.       Lynne Panchal, PharmD       "

## 2023-10-25 NOTE — CARE PLAN
The patient's goals for the shift include      The clinical goals for the shift include Adequate rest and sleep, No falls, continue treatment regimen    Over the shift, the patient did not make progress toward the following goals. Barriers to progression include . Recommendations to address these barriers include .      Problem: Skin  Goal: Decreased wound size/increased tissue granulation at next dressing change  Outcome: Progressing  Goal: Participates in plan/prevention/treatment measures  Outcome: Progressing  Goal: Prevent/manage excess moisture  Outcome: Progressing  Goal: Prevent/minimize sheer/friction injuries  Outcome: Progressing  Goal: Promote/optimize nutrition  Outcome: Progressing  Goal: Promote skin healing  Outcome: Progressing     Problem: Fall/Injury  Goal: Not fall by end of shift  Outcome: Progressing  Goal: Be free from injury by end of the shift  Outcome: Progressing  Goal: Verbalize understanding of personal risk factors for fall in the hospital  Outcome: Progressing  Goal: Verbalize understanding of risk factor reduction measures to prevent injury from fall in the home  Outcome: Progressing  Goal: Use assistive devices by end of the shift  Outcome: Progressing  Goal: Pace activities to prevent fatigue by end of the shift  Outcome: Progressing

## 2023-10-25 NOTE — NURSING NOTE
Assumed care of pt. Bed side shift report received from previous nurse. Patient observed lying in bed with HOB elevated, A&O x 3. Respiration regular and unlabored on room air. No concerns or needs voiced at this time. Call light within reach.bed in lowest position, locked. Bed alarm activated for pt safety.will continue to monitor.

## 2023-10-25 NOTE — CARE PLAN
Problem: Skin  Goal: Prevent/manage excess moisture  Flowsheets (Taken 10/25/2023 4590)  Prevent/manage excess moisture: Cleanse incontinence/protect with barrier cream   The patient's goals for the shift include      The clinical goals for the shift include no pain    Over the shift, the patient did not make progress toward the following goals. Barriers to progression include . Recommendations to address these barriers include .

## 2023-10-25 NOTE — NURSING NOTE
Patient arrived on the floor per stretcher from ED.  No distress noted or discomfort.  Oriented to room.  All needs attended.  Safety measures ensured and call light in reach.

## 2023-10-25 NOTE — CARE PLAN
Problem: PT Problem  Goal: Patient will ambulate 5 distance using walker with moderate assist  Outcome: Progressing  Note: Patient will ambulate 5 distance using walker with moderate assist  Goal: Patient will perform chair to and from bed transfer with moderate assist  Outcome: Progressing  Note: Patient will perform chair to and from bed transfer with moderate assist  Goal: Patient will perform supine to sit on bed with moderate assist demonstrating control  Outcome: Progressing  Note: Patient will perform supine to sit on bed with moderate assist demonstrating control  Goal: Patient will perform sit to supine on bed with moderate assist demonstrating control  Outcome: Progressing  Note: Patient will perform sit to supine on bed with moderate assist demonstrating control

## 2023-10-25 NOTE — PROGRESS NOTES
"Vancomycin Dosing by Pharmacy- FOLLOW UP    Amrita Lopez is a 82 y.o. year old female who Pharmacy has been consulted for vancomycin dosing for cellulitis, skin and soft tissue. Based on the patient's indication and renal status this patient is being dosed based on a goal AUC of 400-600.     Renal function is currently stable.    Current vancomycin dose: 750 mg given every 12 hours    Most recent random level: 7.9 mcg/mL    Visit Vitals  /68 (BP Location: Left arm, Patient Position: Lying)   Pulse 75   Temp 36.4 °C (97.5 °F) (Oral)   Resp 15        Lab Results   Component Value Date    CREATININE 0.80 10/25/2023    CREATININE 0.80 10/24/2023    CREATININE 0.8 12/08/2022    CREATININE 0.6 06/09/2021    CREATININE 0.8 06/08/2021    CREATININE 0.6 03/30/2021        Patient weight is No results found for: \"PTWEIGHT\"    No results found for: \"CULTURE\"     I/O last 3 completed shifts:  In: 200 (1.5 mL/kg) [IV Piggyback:200]  Out: 1000 (7.6 mL/kg) [Urine:1000 (0.2 mL/kg/hr)]  Weight: 131.5 kg   [unfilled]    No results found for: \"PATIENTTEMP\"     Assessment/Plan    Below goal AUC. Orders placed for new vancomcyin regimen of 1500mg every 24 hours to begin at 1200.     This dosing regimen is predicted by [employer] to result in the following pharmacokinetic parameters:    Loading dose: N/A  Regimen: 1500 mg IV every 24 hours.  Start time: 12:06 on 10/25/2023  Exposure target: AUC24 (range)400-600 mg/L.hr   AUC24,ss: 510 mg/L.hr  Probability of AUC24 > 400: 79 %  Ctrough,ss: 13.6 mg/L  Probability of Ctrough,ss > 20: 20 %  Probability of nephrotoxicity (Lodise JASON 2009): 9 %    The next level will be obtained on 10/26 at 0500. May be obtained sooner if clinically indicated.   Will continue to monitor renal function daily while on vancomycin and order serum creatinine at least every 48 hours if not already ordered.  Follow for continued vancomycin needs, clinical response, and signs/symptoms of toxicity. "       Danielle Thomas, PharmD

## 2023-10-25 NOTE — PROGRESS NOTES
TCC attempted to meet with patient earlier, but she was resting in bed. TCC spoke to patient's son, Sacha, on the phone. Assessment complete. Patient lives at [facility]. Patient uses a walker and a wheelchair. Patient smokes daily, more in the summer and less in the winter. [facility] sent clinicals through [facility] to [facility], waiting for their review and answer as to whether they can accept her back or they want her at SNF. Sacha states that she will not want SNF. Will follow.     **PATIENT DOES NOT HAVE A SAFE DISCHARGE PLAN      Gillian Yang RN

## 2023-10-25 NOTE — CONSULTS
"INFECTIOUS DISEASES CONSULTATION NOTE      Referred by HOLLY Sanchez MD    Reason For Consult  Lower extremity cellulitis    History Of Present Illness  Amrita Lopez is a 82 y.o. female presenting with subacute lower extremity swelling, and yellowish-green drainage from the left lower pretibial skin.  She is in an assisted living setting and states that she has had increasing lower extremity swelling without dyspnea or pain.  She developed an area of increasing erythema and then serous oozing in the distal left pretibial skin.  No constitutional symptoms.  She was placed on a course of doxycycline without benefit she was sent to the hospital with a concern because the drainage was reportedly \"green.\" She was started on vancomycin and Zosyn and infectious disease consultation was requested     Past Medical History  Hypertension  Dyslipidemia  Morbid obesity  History of stroke     Social History  Tobacco use: Several cigarettes per day  Alcohol use: Does not abuse alcohol  Living in an assisted living setting.    Family History  Not pertinent to the current question    Allergies  Nickel     Review of Systems  Detailed review of systems completed.  No significant additional positives beyond what is mentioned above    Physical Exam  Vital signs:  Visit Vitals  /65 (BP Location: Left arm, Patient Position: Lying)   Pulse 77   Temp 36.7 °C (98.1 °F) (Oral)   Resp 16      General: Morbidly obese, no acute distress  HEENT:  No scleral icterus or conjunctival suffusion, oral mucosa moist  Nodes:  Negative  Lungs:  Clear to auscultation  Heart:  S1, S2 normal, no pathologic murmur appreciated  Abdomen:  Soft, nontender, obese  Extremities: Bilateral lower extremity lymphedema and chronic stasis changes.  Superimposed on this is a slightly raised, sharply demarcated geographic lesion in the left anteromedial pretibial skin without malodor or, surrounding cellulitis, or suppuration.  Not tender the margins  Neurologic:  " Alert.  Grossly non-focal.      Relevant Results  Results from last 72 hours   Lab Units 10/25/23  0432 10/24/23  1750   WBC AUTO x10*3/uL 8.7 11.5*   HEMOGLOBIN g/dL 12.3 13.2   HEMATOCRIT % 38.8 41.4   PLATELETS AUTO x10*3/uL 266 275   NEUTROS PCT AUTO %  --  56.1   LYMPHS PCT AUTO %  --  28.0   MONOS PCT AUTO %  --  7.6   EOS PCT AUTO %  --  7.3     Results from last 72 hours   Lab Units 10/25/23  0432   CREATININE mg/dL 0.80   ANION GAP mmol/L 8   EGFR mL/min/1.73m*2 75     Results from last 72 hours   Lab Units 10/25/23  0432   AST U/L 10   ALT U/L 7   ALK PHOS U/L 83   BILIRUBIN TOTAL mg/dL 0.6     Microbiology:  Wound: Pending      ASSESSMENT:  Lymphedema, stasis ulcer  No compelling evidence that the patient has cellulitis.  She does not have fever, leukocytosis, cellulitic changes in the leg, or systemic symptoms.  This is essentially a stasis ulcer, and should respond to appropriate topical wound care, and does not require systemic antibiotics    PLANS:  -   Stop vancomycin and Zosyn  -   Wound care consult as discussed with Ms Salgado     THANK YOU FOR ASKING ME TO ASSIST YOU IN THE CARE OF YOUR PATIENT    NO ADDITIONAL RECOMMENDATIONS.  WILL SIGN OFF.  PLEASE RE-CONSULT PRN.  THANK YOU.    Bassam De MD  [facility]  Office:  [phone]

## 2023-10-26 LAB
ANION GAP SERPL CALC-SCNC: 11 MMOL/L
BUN SERPL-MCNC: 19 MG/DL (ref 8–25)
CALCIUM SERPL-MCNC: 9.3 MG/DL (ref 8.5–10.4)
CHLORIDE SERPL-SCNC: 100 MMOL/L (ref 97–107)
CO2 SERPL-SCNC: 28 MMOL/L (ref 24–31)
CREAT SERPL-MCNC: 0.7 MG/DL (ref 0.4–1.6)
ERYTHROCYTE [DISTWIDTH] IN BLOOD BY AUTOMATED COUNT: 13.2 % (ref 11.5–14.5)
GFR SERPL CREATININE-BSD FRML MDRD: 88 ML/MIN/1.73M*2
GLUCOSE SERPL-MCNC: 120 MG/DL (ref 65–99)
HCT VFR BLD AUTO: 40.3 % (ref 36–46)
HGB BLD-MCNC: 13 G/DL (ref 12–16)
MCH RBC QN AUTO: 31 PG (ref 26–34)
MCHC RBC AUTO-ENTMCNC: 32.3 G/DL (ref 32–36)
MCV RBC AUTO: 96 FL (ref 80–100)
NRBC BLD-RTO: 0 /100 WBCS (ref 0–0)
PLATELET # BLD AUTO: 263 X10*3/UL (ref 150–450)
PMV BLD AUTO: 9.3 FL (ref 7.5–11.5)
POTASSIUM SERPL-SCNC: 3.7 MMOL/L (ref 3.4–5.1)
RBC # BLD AUTO: 4.2 X10*6/UL (ref 4–5.2)
SODIUM SERPL-SCNC: 139 MMOL/L (ref 133–145)
WBC # BLD AUTO: 8.8 X10*3/UL (ref 4.4–11.3)

## 2023-10-26 PROCEDURE — 2500000001 HC RX 250 WO HCPCS SELF ADMINISTERED DRUGS (ALT 637 FOR MEDICARE OP): Performed by: INTERNAL MEDICINE

## 2023-10-26 PROCEDURE — 36415 COLL VENOUS BLD VENIPUNCTURE: CPT | Performed by: NURSE PRACTITIONER

## 2023-10-26 PROCEDURE — 1200000002 HC GENERAL ROOM WITH TELEMETRY DAILY

## 2023-10-26 PROCEDURE — 85027 COMPLETE CBC AUTOMATED: CPT | Performed by: NURSE PRACTITIONER

## 2023-10-26 PROCEDURE — 2500000004 HC RX 250 GENERAL PHARMACY W/ HCPCS (ALT 636 FOR OP/ED): Performed by: INTERNAL MEDICINE

## 2023-10-26 PROCEDURE — 96372 THER/PROPH/DIAG INJ SC/IM: CPT | Performed by: INTERNAL MEDICINE

## 2023-10-26 PROCEDURE — 92526 ORAL FUNCTION THERAPY: CPT | Mod: GN | Performed by: SPEECH-LANGUAGE PATHOLOGIST

## 2023-10-26 PROCEDURE — 80048 BASIC METABOLIC PNL TOTAL CA: CPT | Performed by: NURSE PRACTITIONER

## 2023-10-26 PROCEDURE — 3490 HC RX 250 GENERAL PHARMACY W/ HCPCS (ALT 636 FOR OP/ED): Performed by: INTERNAL MEDICINE

## 2023-10-26 RX ADMIN — NYSTATIN 1 APPLICATION: 100000 POWDER TOPICAL at 09:32

## 2023-10-26 RX ADMIN — ENOXAPARIN SODIUM 40 MG: 40 INJECTION SUBCUTANEOUS at 09:32

## 2023-10-26 RX ADMIN — LORATADINE 10 MG: 10 TABLET ORAL at 09:32

## 2023-10-26 RX ADMIN — FUROSEMIDE 40 MG: 40 TABLET ORAL at 09:32

## 2023-10-26 RX ADMIN — ACETAMINOPHEN 500 MG: 500 TABLET ORAL at 22:06

## 2023-10-26 RX ADMIN — CLOPIDOGREL BISULFATE 75 MG: 75 TABLET ORAL at 09:32

## 2023-10-26 RX ADMIN — Medication 1 APPLICATION: at 09:32

## 2023-10-26 RX ADMIN — ACETAMINOPHEN 500 MG: 500 TABLET ORAL at 06:09

## 2023-10-26 RX ADMIN — CYANOCOBALAMIN TAB 500 MCG 500 MCG: 500 TAB at 09:32

## 2023-10-26 RX ADMIN — NYSTATIN 1 APPLICATION: 100000 POWDER TOPICAL at 22:07

## 2023-10-26 RX ADMIN — Medication 1 APPLICATION: at 22:07

## 2023-10-26 RX ADMIN — MELOXICAM 7.5 MG: 7.5 TABLET ORAL at 09:32

## 2023-10-26 RX ADMIN — ENOXAPARIN SODIUM 40 MG: 40 INJECTION SUBCUTANEOUS at 22:06

## 2023-10-26 RX ADMIN — SENNOSIDES 17.2 MG: 8.6 TABLET, FILM COATED ORAL at 09:32

## 2023-10-26 RX ADMIN — ATORVASTATIN CALCIUM 10 MG: 10 TABLET, FILM COATED ORAL at 22:06

## 2023-10-26 ASSESSMENT — COGNITIVE AND FUNCTIONAL STATUS - GENERAL
CLIMB 3 TO 5 STEPS WITH RAILING: TOTAL
MOBILITY SCORE: 11
PERSONAL GROOMING: A LOT
DRESSING REGULAR LOWER BODY CLOTHING: A LOT
DRESSING REGULAR UPPER BODY CLOTHING: TOTAL
TOILETING: TOTAL
MOBILITY SCORE: 11
MOVING FROM LYING ON BACK TO SITTING ON SIDE OF FLAT BED WITH BEDRAILS: A LOT
PERSONAL GROOMING: A LOT
STANDING UP FROM CHAIR USING ARMS: A LOT
DAILY ACTIVITIY SCORE: 11
HELP NEEDED FOR BATHING: A LOT
TURNING FROM BACK TO SIDE WHILE IN FLAT BAD: A LOT
WALKING IN HOSPITAL ROOM: A LOT
TURNING FROM BACK TO SIDE WHILE IN FLAT BAD: A LOT
MOVING TO AND FROM BED TO CHAIR: A LOT
HELP NEEDED FOR BATHING: A LOT
DRESSING REGULAR UPPER BODY CLOTHING: TOTAL
EATING MEALS: A LITTLE
MOVING TO AND FROM BED TO CHAIR: A LOT
WALKING IN HOSPITAL ROOM: A LOT
TOILETING: TOTAL
MOVING FROM LYING ON BACK TO SITTING ON SIDE OF FLAT BED WITH BEDRAILS: A LOT
DRESSING REGULAR LOWER BODY CLOTHING: A LOT
EATING MEALS: A LITTLE
STANDING UP FROM CHAIR USING ARMS: A LOT
DAILY ACTIVITIY SCORE: 11
CLIMB 3 TO 5 STEPS WITH RAILING: TOTAL

## 2023-10-26 ASSESSMENT — PAIN - FUNCTIONAL ASSESSMENT
PAIN_FUNCTIONAL_ASSESSMENT: FLACC (FACE, LEGS, ACTIVITY, CRY, CONSOLABILITY)
PAIN_FUNCTIONAL_ASSESSMENT: 0-10
PAIN_FUNCTIONAL_ASSESSMENT: FLACC (FACE, LEGS, ACTIVITY, CRY, CONSOLABILITY)
PAIN_FUNCTIONAL_ASSESSMENT: 0-10

## 2023-10-26 ASSESSMENT — PAIN DESCRIPTION - DESCRIPTORS
DESCRIPTORS: ACHING
DESCRIPTORS: BURNING

## 2023-10-26 ASSESSMENT — PAIN SCALES - GENERAL
PAINLEVEL_OUTOF10: 0 - NO PAIN

## 2023-10-26 NOTE — NURSING NOTE
Order review at end of shift completed. All needs met, all safety maintained. Pt resting in bed comfortably. Will endorse to oncoming RN to monitor pt closely.

## 2023-10-26 NOTE — NURSING NOTE
No changes from previous assessment noted. All safety measures maintained. Pt resting in bed comfortably. Heart monitor readings 74 BPM sinus rhythm. Call light in reach.

## 2023-10-26 NOTE — PROGRESS NOTES
Anticipate discharge soon. [facility] faxed clinicals to [facility] and waiting to hear if they can accept her back. Per patient's son, Sacha, she will not want to go to SNF. At this time there is not a safe discharge plan in place.     **PATIENT DOES NOT HAVE A SAFE DISCHARGE PLAN      Gillian Yang RN

## 2023-10-26 NOTE — PROGRESS NOTES
Amrita Lopez is a 82 y.o. female on day 1 of admission presenting with Cellulitis of lower extremity, unspecified laterality.    Subjective   Patient seen and examined.  Resting in bed in no acute distress.  Awake alert oriented x 3.  Tired.  No other complaints.  No lower extremity pain.  No fevers chills or sweats.    Objective     Physical Exam  Vitals reviewed.   Constitutional:       General: She is not in acute distress.     Appearance: She is obese. She is not ill-appearing, toxic-appearing or diaphoretic.   HENT:      Head: Normocephalic and atraumatic.      Right Ear: Tympanic membrane normal.      Left Ear: Tympanic membrane normal.      Nose: Nose normal.      Mouth/Throat:      Mouth: Mucous membranes are moist.      Pharynx: Oropharynx is clear.   Eyes:      Extraocular Movements: Extraocular movements intact.      Conjunctiva/sclera: Conjunctivae normal.      Pupils: Pupils are equal, round, and reactive to light.   Cardiovascular:      Rate and Rhythm: Normal rate and regular rhythm.      Pulses: Normal pulses.      Heart sounds: Normal heart sounds.   Pulmonary:      Effort: Pulmonary effort is normal. No respiratory distress.      Breath sounds: Normal breath sounds. No wheezing, rhonchi or rales.   Abdominal:      General: Bowel sounds are normal. There is no distension.      Palpations: Abdomen is soft.      Tenderness: There is no abdominal tenderness. There is no guarding.   Genitourinary:     Comments: Rectal examination deferred  Musculoskeletal:         General: Swelling present. Normal range of motion.      Cervical back: Normal range of motion and neck supple.      Right lower leg: Edema present.      Left lower leg: Edema present.      Comments: Mobility limited   Skin:     General: Skin is warm and dry.      Capillary Refill: Capillary refill takes less than 2 seconds.      Comments: Bilateral lower extremity lymphedema.  Chronic venous stasis.  Generalized erythema.  Left lower  "extremity dressing removed ulceration noted serous drainage no odor mild surrounding erythema.     Neurological:      General: No focal deficit present.      Mental Status: She is alert and oriented to person, place, and time.      Comments: Generalized lower extremity weakness, no focal weakness. Mobility limited   Psychiatric:         Mood and Affect: Mood normal.         Behavior: Behavior normal.       Last Recorded Vitals  Blood pressure 151/75, pulse 75, temperature 36.9 °C (98.4 °F), temperature source Oral, resp. rate 17, height 1.626 m (5' 4\"), weight 132 kg (290 lb), SpO2 95 %.    Intake/Output last 3 Shifts:  I/O last 3 completed shifts:  In: 450 (3.4 mL/kg) [P.O.:250; IV Piggyback:200]  Out: 1000 (7.6 mL/kg) [Urine:1000 (0.2 mL/kg/hr)]  Weight: 131.5 kg     Wound culture pending    Relevant Results  Results for orders placed or performed during the hospital encounter of 10/24/23 (from the past 24 hour(s))   Urinalysis with Reflex Microscopic   Result Value Ref Range    Color, Urine Colorless (N) Light-Yellow, Yellow, Dark-Yellow    Appearance, Urine Clear Clear    Specific Gravity, Urine 1.016 1.005 - 1.035    pH, Urine 6.0 5.0, 5.5, 6.0, 6.5, 7.0, 7.5, 8.0    Protein, Urine NEGATIVE NEGATIVE, 10 (TRACE), 20 (TRACE) mg/dL    Glucose, Urine Normal Normal mg/dL    Blood, Urine NEGATIVE NEGATIVE    Ketones, Urine NEGATIVE NEGATIVE mg/dL    Bilirubin, Urine NEGATIVE NEGATIVE    Urobilinogen, Urine Normal Normal mg/dL    Nitrite, Urine NEGATIVE NEGATIVE    Leukocyte Esterase, Urine NEGATIVE NEGATIVE   Tissue/Wound Culture/Smear    Specimen: Wound/Tissue; Tissue/Biopsy   Result Value Ref Range    Gram Stain (2+) Few Polymorphonuclear leukocytes (A)     Gram Stain (1+) Rare Gram negative bacilli (A)    CBC   Result Value Ref Range    WBC 8.7 4.4 - 11.3 x10*3/uL    nRBC 0.0 0.0 - 0.0 /100 WBCs    RBC 4.00 4.00 - 5.20 x10*6/uL    Hemoglobin 12.3 12.0 - 16.0 g/dL    Hematocrit 38.8 36.0 - 46.0 %    MCV 97 80 - 100 " fL    MCH 30.8 26.0 - 34.0 pg    MCHC 31.7 (L) 32.0 - 36.0 g/dL    RDW 13.4 11.5 - 14.5 %    Platelets 266 150 - 450 x10*3/uL    MPV 9.8 7.5 - 11.5 fL   Comprehensive metabolic panel   Result Value Ref Range    Glucose 101 (H) 65 - 99 mg/dL    Sodium 142 133 - 145 mmol/L    Potassium 3.6 3.4 - 5.1 mmol/L    Chloride 105 97 - 107 mmol/L    Bicarbonate 29 24 - 31 mmol/L    Urea Nitrogen 18 8 - 25 mg/dL    Creatinine 0.80 0.40 - 1.60 mg/dL    eGFR 75 >60 mL/min/1.73m*2    Calcium 9.1 8.5 - 10.4 mg/dL    Albumin 3.4 (L) 3.5 - 5.0 g/dL    Alkaline Phosphatase 83 35 - 125 U/L    Total Protein 5.8 (L) 5.9 - 7.9 g/dL    AST 10 5 - 40 U/L    Bilirubin, Total 0.6 0.1 - 1.2 mg/dL    ALT 7 5 - 40 U/L    Anion Gap 8 <=19 mmol/L   Vancomycin   Result Value Ref Range    Vancomycin 7.9 (L) 10.0 - 20.0 ug/mL     FL modified barium swallow study    Result Date: 10/25/2023  Interpreted By:  Jai Richardson and Brodsky Sheryl STUDY: FL MODIFIED BARIUM SWALLOW STUDY;; 10/25/2023 3:37 pm   INDICATION: Signs/Symptoms:Dysphasia.   COMPARISON: None.   ACCESSION NUMBER(S): [id number]   ORDERING CLINICIAN: VENUS BESS   TECHNIQUE: MBSS completed. Informed verbal consent obtained prior to completion of exam. Trials of thin, nectar thick, honey thick, puree, soft-solids, and regular solids given. Total fluoroscopy time:  1 minute 32 seconds Radiation exposure (Reference Air Kerma):  53.94 mGy Images:  13 cine images     SLP: Marielle GRIFFIN/SLP Phone/Pager: [phone]   SPEECH FINDINGS: Reason for referral: R/o aspiration Patient hx: Cellulitis of lower extremity, unspecified laterality Lymphedema Hypertension Hyperlipidemia History of CVA Respiratory status: Room air Previous diet: Regular and thin liquids   FINAL SPEECH RECOMMENDATIONS   Diet recommendations/feeding strategies: Solid Consistency: Regular (IDDSI Level 7) Liquid Consistency: Thin (IDDSI Level 0)   Compensatory Strategy Recommendations: Single sips, Slow rate,  Small sips Postural Changes and/or Swallow Maneuvers: Upright 90 degrees for all PO intake   Follow-up speech therapy recommended: yes   Short term goals: Pt will: Tolerate regular solids and thin liquids without s/s aspiration Utilize safe swallow strategies with 90% acc  to reduce aspiration risk Long term goals: Pt will: Tolerate regular solids and thin liquids without s/s aspiration   Education provided: Pt/nursing staff educated on results of MBS, recommendations, and safe swallow strategies, ....P and RN able to able to verbalize understanding and agreement,  pt requires reinforcement/assistance from staff/caregivers Treatment Provided today: no     Mechanics of the swallow summary: *Oral phase:   Bolus head @ valleculae with thin, nectar and honey, pyriforms with pudding and solid *Pharyngeal phase:     -Partial anterior hyoid excursion -Partial eppiglottic inversion   SLP impressions with severity rating:  Mild oropharyngeal stage dysphagia, with laryngeal penetration above folds, d/t delayed pharyngeal swallow initiation and decreased airway closure ejected, low risk for aspiration   Thin Liquids (MBSS) Rosenbek's Penetration Aspiration Scale, Thin Liquids (MBSS): 2. PENETRATION that CLEARS - contrast enter airway, above vocal cords, no residue   Nectar Thick Liquids (MBSS) Rosenbek's Penetration Aspiration Scale, Nectar thick liquids (MBSS):1. NO ASPIRATION & NO PENETRATION - no aspiration, contrast does not enter airway   Honey Thick Liquids (MBSS) Rosenbek's Penetration Aspiration Scale, Honey thick liquids (MBSS): 1. NO ASPIRATION & NO PENETRATION - no aspiration, contrast does not enter airway   Purees (MBSS) Rosenbek's Penetration Aspiration Scale, Purees (MBSS): 1. NO ASPIRATION & NO PENETRATION - no aspiration, contrast does not enter airway   Solids (MBSS) Rosenbek's Penetration Aspiration Scale, Solids (MBSS): 1. NO ASPIRATION & NO PENETRATION - no aspiration, contrast does not enter airway    Speech Therapy section of this report signed by regi laura MA-CCC/SLP on 10/25/2023 at 3:50 pm.   RADIOLOGY FINDINGS: There is laryngeal penetration without aspiration with thin liquid barium. No evidence for laryngeal penetration or aspiration noted with nectar, honey, pudding, or cracker consistency barium   Radiology section of this report signed by Dylan.         Scheduled medications  acetaminophen, 500 mg, oral, q8h ANNIE  ammonium lactate, 1 Application, Topical, BID  atorvastatin, 10 mg, oral, Nightly  clopidogrel, 75 mg, oral, Daily  cyanocobalamin, 500 mcg, oral, Daily  enoxaparin, 40 mg, subcutaneous, q12h ANNIE  furosemide, 40 mg, oral, Daily  loratadine, 10 mg, oral, Daily  meloxicam, 7.5 mg, oral, Daily  nystatin, 1 Application, Topical, BID  sennosides, 2 tablet, oral, BID      Continuous medications     PRN medications  PRN medications: acetaminophen **OR** acetaminophen **OR** acetaminophen, acetaminophen, albuterol, benzocaine-menthol, dextromethorphan-guaifenesin, guaiFENesin, magnesium hydroxide, ondansetron ODT **OR** ondansetron, polyethylene glycol    ASSESSMENT:  Lower extremity ulceration, possible cellulitis   Bilateral lower extremity venous stasis  Lymphedema  Morbid Obesity  Hypertension  Hyperlipidemia  History CVA  Type 2 diabetes mellitus    PLAN:  Infectious disease consultation pending.  We will continue IV antibiotics.  Local wound care.  Follow wound culture.  Monitor temperature, white blood cell count.  P.r.n analgesics.  Lower extremity elevation and compression.  Encourage protein intake.  Cardiac, ADA diet.  Point of care glucose reviewed.  Monitor point of care glucose AC/HS.  Insulin if needed.  Adjust for glycemic control.  Diet, weight loss, exercise education.  PT/OT.  Fall precautions.  Up with assistance only.  Deep vein thrombosis prophylaxis Lovenox subcutaneous.  Supportive care.  Patient reassured.  Case management following for discharge planning.  She presents  from [facility] assisted living.  Await discharge arrangements.  Anticipate discharge after cleared by Infectious disease.      Tiffany Salgado, APRN-CNP

## 2023-10-26 NOTE — NURSING NOTE
Scheduled medications administered, bed bath comleted.Topical scheduled lac-Hydrin applied to BLE, dressing to LLE changed at this time.pt tolerated well.External female catheter in place,intact,connected to continuous wall suction.Clear, yellow urine noted in drainage container.call light within reach.Bed larm in place for pt safety.

## 2023-10-26 NOTE — NURSING NOTE
Assumed care of pt. BSSR completed, pt in bed resting denies any needs/pain. Purewick external catheter in place to suction, yellow urine in chamber. BLE dsgs C/D/I.  Call light in reach. Bed locked, low. Bed alarm on for pt safety. Will continue to monitor pt.

## 2023-10-26 NOTE — NURSING NOTE
Wound care nurse Ramona wong to do wound site care on pt. Recommending Daily dsg change on BLE with Xeroform, ABD, Kerlex Amonnium lactate cream, ACE wrap. Change daily and PRN.

## 2023-10-26 NOTE — CARE PLAN
The patient's goals for the shift include      The clinical goals for the shift include no pain    Over the shift, the patient did not make progress toward the following goals. Barriers to progression include . Recommendations to address these barriers include   Problem: Skin  Goal: Decreased wound size/increased tissue granulation at next dressing change  Outcome: Progressing  Goal: Participates in plan/prevention/treatment measures  Outcome: Progressing  Goal: Prevent/manage excess moisture  Outcome: Progressing  Goal: Prevent/minimize sheer/friction injuries  Outcome: Progressing  Goal: Promote/optimize nutrition  Outcome: Progressing  Goal: Promote skin healing  Outcome: Progressing     Problem: Fall/Injury  Goal: Not fall by end of shift  Outcome: Progressing  Goal: Be free from injury by end of the shift  Outcome: Progressing  Goal: Verbalize understanding of personal risk factors for fall in the hospital  Outcome: Progressing  Goal: Verbalize understanding of risk factor reduction measures to prevent injury from fall in the home  Outcome: Progressing  Goal: Use assistive devices by end of the shift  Outcome: Progressing  Goal: Pace activities to prevent fatigue by end of the shift  Outcome: Progressing   .

## 2023-10-26 NOTE — NURSING NOTE
No changes from previous assessment noted. Call light in reach. All safety measures maintained. Will continue to monitor pt further.

## 2023-10-26 NOTE — PROGRESS NOTES
Speech-Language Pathology    Inpatient  Speech-Language Pathology Treatment     Patient Name: Amrita Lopez  MRN: [id number]  Today's Date: 10/26/2023  Time Calculation  Start Time: 1450  Stop Time: 1501  Time Calculation (min): 11 min         Current Problem:   1. Cellulitis of lower extremity, unspecified laterality        2. Diastolic hypertension              SLP Assessment:  SLP TX Intervention Outcome: Making Progress Towards Goals  Prognosis: Good  Treatment Provided: Yes   Treatment Tolerance: Patient tolerated treatment well  Medical Staff Made Aware: Yes  Barriers: None       Plan:  SLP TX Plan: Discharge from Speech Therapy  Discussed with pt and RN      Subjective   Pt tolerated approx 3 oz thin liquids, via cup and straw, and 2 araseli crackers without s/s aspiration, using safe swallow strategies independently. No further tx indicated    General Visit Information:   Prior to Session Communication: Bedside nurse (hollis mccauley RN)      Pain Assessment:   Pain Assessment: 0-10  Pain Score: 0 - No pain    Therapeutic Swallow Intervention : Compensatory Strategies, PO Trials  Solid Diet Recommendations: Regular (IDDSI Level 7)  Liquid Diet Recommendations: Thin (IDDSI Level 0)    Compensatory Strategy Recommendations: Single sips, Slow rate, Small sips  Postural Changes and/or Swallow Maneuvers: Upright 90 degrees for all PO intake    Objective   Pt tolerated approx 3 oz thin liquids, via cup and straw, and 2 araseli crackers without s/s aspiration, using safe swallow strategies independently               Inpatient:  Education Documentation  Pt/nursing staff educated on safe swallow strategies, ....Pt and RN able to able to verbalize understanding and agreement,  pt requires reinforcement/assistance from staff/caregivers

## 2023-10-26 NOTE — CONSULTS
Wound Care Consult     Visit Date: 10/26/2023      Patient Name: Amrita Lopez         MRN: [id number]           YOB: 1940     Reason for Consult: Left lower extremity wound      Wound History: Present on admission. Patient with cellulitis and stasis ulcer to Left lower leg.       A 82 y.o. year old female admitted for Principal Problem:    Cellulitis of lower extremity, unspecified laterality  Lymphedema    History reviewed. No pertinent past medical history.   Past Surgical History:   Procedure Laterality Date    MR HEAD ANGIO WO IV CONTRAST  11/3/2020    MR HEAD ANGIO WO IV CONTRAST [facility]    MR HEAD ANGIO WO IV CONTRAST  3/29/2021    MR HEAD ANGIO WO IV CONTRAST [facility]    MR NECK ANGIO WO IV CONTRAST  3/30/2021    MR NECK ANGIO WO IV CONTRAST [facility]       Scheduled medications  acetaminophen, 500 mg, oral, q8h ANNIE  ammonium lactate, 1 Application, Topical, BID  atorvastatin, 10 mg, oral, Nightly  clopidogrel, 75 mg, oral, Daily  cyanocobalamin, 500 mcg, oral, Daily  enoxaparin, 40 mg, subcutaneous, q12h ANNIE  furosemide, 40 mg, oral, Daily  loratadine, 10 mg, oral, Daily  meloxicam, 7.5 mg, oral, Daily  nystatin, 1 Application, Topical, BID  sennosides, 2 tablet, oral, BID      Continuous medications     PRN medications  PRN medications: acetaminophen **OR** acetaminophen **OR** acetaminophen, acetaminophen, albuterol, benzocaine-menthol, dextromethorphan-guaifenesin, guaiFENesin, magnesium hydroxide, ondansetron ODT **OR** ondansetron, polyethylene glycol    Allergies   Allergen Reactions    Nickel Hives       Pertinent Labs:   Albumin   Date Value Ref Range Status   10/25/2023 3.4 (L) 3.5 - 5.0 g/dL Final       Wound Assessment:  Wound 10/24/23 Other (comment) Pretibial Left;Lower;Lateral (Active)   Wound Image   10/25/23 0136   Site Assessment Edema;Maceration;Painful;Blanchable erythema 10/26/23 1200   Maya-Wound Assessment Edema;Pink 10/26/23 1200    Wound Length (cm) 12.3 cm 10/26/23 1200   Wound Width (cm) 8 cm 10/26/23 1200   Wound Surface Area (cm^2) 98.4 cm^2 10/26/23 1200   State of Healing Non-healing 10/26/23 1200   Margins Poorly defined 10/26/23 1200   Drainage Description Serous 10/26/23 1200   Drainage Amount Moderate 10/26/23 1200   Dressing Impregnated gauze;Gauze;ABD;Kerlix/rolled gauze;Compression bandage 10/26/23 1200   Dressing Changed Changed 10/26/23 1200   Dressing Status Clean;Dry 10/26/23 1200       Wound Team Summary Assessment:     Exam conducted on day 2 of stay with knowledge of Floor Nurse. Introductions made to patient alert and oriented with periods of confusion. On exam patient sitting fowlers in bed. Purewick in place but patient still incontinent of urine requiring needing cleaned, absorb pad changed, and new Purewick applied. Heel borders in place. Bilateral lower extremity lymphedema. Left worse than right. Right lower leg dark pink. Left lower leg dressing intact with serous shadowing. Dressing removed. Abd pad needing wet with saline due to sticking to wound. Patient sensitive to touch, area red, edematous and macerated from drainage. Area cleansed with saline, Xeroform applied, covered with gauze, abd pad, kerlix and acewrap. Patient on an Advanta 2 bedframe with Accumax mattress. Recommend waffle mattress if patient not getting up to chair or ambulating often. Zohaib Laureano RN updated, to continue pressure injury prevention interventions, Woundcare, and nursing to continue to follow providers orders. Reconsult Wound RN PRN. Rmaona SHERIFFN RN     Wound Team Plan: Left lower leg- cleanse with soap and water, pat dry, apply Xeroform, gauze, ABD pad, wrap with Kerlix and acewrap daily and prn.      Ramona Trevino RN  10/26/2023  1:21 PM

## 2023-10-27 LAB — GLUCOSE BLD MANUAL STRIP-MCNC: 96 MG/DL (ref 74–99)

## 2023-10-27 PROCEDURE — 96372 THER/PROPH/DIAG INJ SC/IM: CPT | Performed by: INTERNAL MEDICINE

## 2023-10-27 PROCEDURE — 2500000004 HC RX 250 GENERAL PHARMACY W/ HCPCS (ALT 636 FOR OP/ED): Performed by: INTERNAL MEDICINE

## 2023-10-27 PROCEDURE — 2500000001 HC RX 250 WO HCPCS SELF ADMINISTERED DRUGS (ALT 637 FOR MEDICARE OP): Performed by: INTERNAL MEDICINE

## 2023-10-27 PROCEDURE — 82947 ASSAY GLUCOSE BLOOD QUANT: CPT

## 2023-10-27 PROCEDURE — 3490 HC RX 250 GENERAL PHARMACY W/ HCPCS (ALT 636 FOR OP/ED): Performed by: INTERNAL MEDICINE

## 2023-10-27 PROCEDURE — 94760 N-INVAS EAR/PLS OXIMETRY 1: CPT

## 2023-10-27 PROCEDURE — 1200000002 HC GENERAL ROOM WITH TELEMETRY DAILY

## 2023-10-27 PROCEDURE — 97110 THERAPEUTIC EXERCISES: CPT | Mod: GO,CO

## 2023-10-27 PROCEDURE — 9420000001 HC RT PATIENT EDUCATION 5 MIN

## 2023-10-27 PROCEDURE — 2500000002 HC RX 250 W HCPCS SELF ADMINISTERED DRUGS (ALT 637 FOR MEDICARE OP, ALT 636 FOR OP/ED): Performed by: NURSE PRACTITIONER

## 2023-10-27 PROCEDURE — 94640 AIRWAY INHALATION TREATMENT: CPT

## 2023-10-27 RX ORDER — IPRATROPIUM BROMIDE AND ALBUTEROL SULFATE 2.5; .5 MG/3ML; MG/3ML
3 SOLUTION RESPIRATORY (INHALATION)
Status: DISCONTINUED | OUTPATIENT
Start: 2023-10-27 | End: 2023-10-29 | Stop reason: HOSPADM

## 2023-10-27 RX ORDER — PANTOPRAZOLE SODIUM 40 MG/1
40 TABLET, DELAYED RELEASE ORAL
Status: DISCONTINUED | OUTPATIENT
Start: 2023-10-28 | End: 2023-10-29 | Stop reason: HOSPADM

## 2023-10-27 RX ORDER — LORATADINE 10 MG/1
10 TABLET ORAL DAILY
Status: DISCONTINUED | OUTPATIENT
Start: 2023-10-28 | End: 2023-10-29 | Stop reason: HOSPADM

## 2023-10-27 RX ADMIN — IPRATROPIUM BROMIDE AND ALBUTEROL SULFATE 3 ML: 2.5; .5 SOLUTION RESPIRATORY (INHALATION) at 12:11

## 2023-10-27 RX ADMIN — CYANOCOBALAMIN TAB 500 MCG 500 MCG: 500 TAB at 09:24

## 2023-10-27 RX ADMIN — Medication 1 APPLICATION: at 09:25

## 2023-10-27 RX ADMIN — LORATADINE 10 MG: 10 TABLET ORAL at 09:24

## 2023-10-27 RX ADMIN — ATORVASTATIN CALCIUM 10 MG: 10 TABLET, FILM COATED ORAL at 20:53

## 2023-10-27 RX ADMIN — Medication 1 APPLICATION: at 20:52

## 2023-10-27 RX ADMIN — ENOXAPARIN SODIUM 40 MG: 40 INJECTION SUBCUTANEOUS at 09:23

## 2023-10-27 RX ADMIN — ENOXAPARIN SODIUM 40 MG: 40 INJECTION SUBCUTANEOUS at 20:52

## 2023-10-27 RX ADMIN — NYSTATIN 1 APPLICATION: 100000 POWDER TOPICAL at 20:51

## 2023-10-27 RX ADMIN — MELOXICAM 7.5 MG: 7.5 TABLET ORAL at 09:24

## 2023-10-27 RX ADMIN — CLOPIDOGREL BISULFATE 75 MG: 75 TABLET ORAL at 09:24

## 2023-10-27 RX ADMIN — FUROSEMIDE 40 MG: 40 TABLET ORAL at 09:24

## 2023-10-27 RX ADMIN — ACETAMINOPHEN 500 MG: 500 TABLET ORAL at 14:53

## 2023-10-27 RX ADMIN — IPRATROPIUM BROMIDE AND ALBUTEROL SULFATE 3 ML: 2.5; .5 SOLUTION RESPIRATORY (INHALATION) at 18:59

## 2023-10-27 RX ADMIN — SENNOSIDES 17.2 MG: 8.6 TABLET, FILM COATED ORAL at 09:24

## 2023-10-27 RX ADMIN — ACETAMINOPHEN 500 MG: 500 TABLET ORAL at 20:52

## 2023-10-27 RX ADMIN — NYSTATIN 1 APPLICATION: 100000 POWDER TOPICAL at 09:25

## 2023-10-27 RX ADMIN — ACETAMINOPHEN 500 MG: 500 TABLET ORAL at 05:55

## 2023-10-27 ASSESSMENT — COGNITIVE AND FUNCTIONAL STATUS - GENERAL
TOILETING: TOTAL
HELP NEEDED FOR BATHING: A LITTLE
DRESSING REGULAR LOWER BODY CLOTHING: TOTAL
DAILY ACTIVITIY SCORE: 14
EATING MEALS: A LITTLE
HELP NEEDED FOR BATHING: A LITTLE
MOVING FROM LYING ON BACK TO SITTING ON SIDE OF FLAT BED WITH BEDRAILS: TOTAL
DRESSING REGULAR LOWER BODY CLOTHING: TOTAL
DRESSING REGULAR LOWER BODY CLOTHING: TOTAL
MOBILITY SCORE: 15
PERSONAL GROOMING: A LITTLE
TURNING FROM BACK TO SIDE WHILE IN FLAT BAD: TOTAL
HELP NEEDED FOR BATHING: TOTAL
DRESSING REGULAR UPPER BODY CLOTHING: A LITTLE
MOVING TO AND FROM BED TO CHAIR: TOTAL
EATING MEALS: A LITTLE
DRESSING REGULAR UPPER BODY CLOTHING: A LITTLE
PERSONAL GROOMING: TOTAL
DAILY ACTIVITIY SCORE: 9
TOILETING: TOTAL
DRESSING REGULAR UPPER BODY CLOTHING: TOTAL
DAILY ACTIVITIY SCORE: 14
TURNING FROM BACK TO SIDE WHILE IN FLAT BAD: TOTAL
MOBILITY SCORE: 15
MOVING FROM LYING ON BACK TO SITTING ON SIDE OF FLAT BED WITH BEDRAILS: TOTAL
PERSONAL GROOMING: A LITTLE
TOILETING: TOTAL
MOVING TO AND FROM BED TO CHAIR: TOTAL

## 2023-10-27 ASSESSMENT — PAIN - FUNCTIONAL ASSESSMENT
PAIN_FUNCTIONAL_ASSESSMENT: 0-10
PAIN_FUNCTIONAL_ASSESSMENT: FLACC (FACE, LEGS, ACTIVITY, CRY, CONSOLABILITY)
PAIN_FUNCTIONAL_ASSESSMENT: FLACC (FACE, LEGS, ACTIVITY, CRY, CONSOLABILITY)
PAIN_FUNCTIONAL_ASSESSMENT: 0-10

## 2023-10-27 ASSESSMENT — PAIN SCALES - GENERAL
PAINLEVEL_OUTOF10: 0 - NO PAIN
PAINLEVEL_OUTOF10: 3
PAINLEVEL_OUTOF10: 0 - NO PAIN

## 2023-10-27 NOTE — NURSING NOTE
Nursing student changed BLE leg dressing with instructor per prior note, Patient is in bed awake and call light and possessions within reach.

## 2023-10-27 NOTE — NURSING NOTE
Assumed care of pt. Bed side shift report received from previous nurse. Patient observed lying in bed with HOB elevated, A&O x 2x 3. Respiration regular and unlabored on room air. No concerns or needs voiced at this time. Call light within reach.bed in lowest position, locked. Bed alarm activated for pt safety. Will continue to monitor.

## 2023-10-27 NOTE — CARE PLAN
The patient's goals for the shift include      The clinical goals for the shift include no falls pain free    Over the shift, the patient did not make progress toward the following goals. Barriers to progression include . Recommendations to address these barriers include .

## 2023-10-27 NOTE — CARE PLAN
Problem: Respiratory  Goal: Clear secretions with interventions this shift  Outcome: Progressing  Goal: No signs of respiratory distress (eg. Use of accessory muscles. Peds grunting)  Outcome: Progressing  Goal: Patent airway maintained this shift  Outcome: Progressing  Goal: Verbalize decreased shortness of breath this shift  Outcome: Progressing

## 2023-10-27 NOTE — CARE PLAN
The patient's goals for the shift include  No fall    The clinical goals for the shift include no falls,pain free    Over the shift, the patient did not make progress toward the following goals. Barriers to progression include forgetful. Recommendations to address these barriers include educate about asking for help with call light..  No pain

## 2023-10-27 NOTE — NURSING NOTE
Assumed care of patient, patient is in bed awake with call light ans possessions within reach. Student nurse this morning.

## 2023-10-27 NOTE — PROGRESS NOTES
Patient not medically clear. [facility] cannot [facility] patient [facility] until after she goes for short term rehab. [facility] spoke to patient's son, Sacha, and he wants her at [place]. [facility] sent referral and they can accept. [facility] requested that our precert team start precert on this day. [id number] not completed.     **PATIENT WITH A SAFE DISCHARGE PLAN, PRECERT PENDING      Gillian Yang RN

## 2023-10-27 NOTE — NURSING NOTE
No changes from previous assessment noted. All safety measures maintained. Pt resting in bed comfortably. Call light in reach

## 2023-10-27 NOTE — NURSING NOTE
Pt had bilateral dressing changed to rt and left legs.  Legs slightly redden no drainage noted. Pt ordered dressing applied per wound order pt tolerating well.

## 2023-10-27 NOTE — PROGRESS NOTES
Occupational Therapy    OT Treatment    Patient Name: Amrita Lopez  MRN: [id number]  Today's Date: 10/27/2023  Time Calculation  Start Time: 1622  Stop Time: 1636  Time Calculation (min): 14 min         Assessment:  OT Assessment: Gradual progress made towards OT goals. Continue with current OT POC to maximize safety and independence during ADLs prior to discharge.  Evaluation/Treatment Tolerance: Patient limited by fatigue  End of Session Communication: PCT/NA/CTA  End of Session Patient Position: Bed, 4 rail up, Alarm off, not on at start of session  OT Assessment Results: Decreased ADL status, Decreased upper extremity range of motion, Decreased upper extremity strength, Decreased safe judgment during ADL, Decreased cognition, Decreased endurance, Decreased functional mobility, Decreased IADLs  Evaluation/Treatment Tolerance: Patient limited by fatigue  Plan:  OT Discharge Recommendations: Moderate intensity level of continued care       Subjective   General:  OT Received On: 10/27/23  Reason for Referral: weakness and increased difficulty with ADLs, BLE cellulitis  Past Medical History Relevant to Rehab: HTN, CVA, hyperlipid, lymphedema,  Family/Caregiver Present: No  Prior to Session Communication: Bedside nurse  Patient Position Received: Bed, 4 rail up, Alarm off, not on at start of session  General Comment: Pt cleared for tx per nursing, cooperative throughout OT session.  Vital Signs:     Pain:  Pain Assessment  Pain Assessment: 0-10  Pain Score: 0 - No pain  Clinical Progression: Not changed    Objective         Bed Mobility/Transfers: Bed Mobility  Bed Mobility: Yes  Bed Mobility 1  Bed Mobility 1: Long sit  Level of Assistance 1: Moderate assistance  Bed Mobility Comments 1: use of B bed rails and verbal cues for sequencing. Repeated trials completed to maximize pt potential during functional tasks of choice.    Transfers  Transfer: No    Therapy/Activity: Therapeutic Exercise  Therapeutic Exercise  Performed: Yes  Therapeutic Exercise Activity 1: BUE exercises completed 1x15 in all planes against gravity to increase strength and functional tolerance for safety and ease of ADL/ADL transfer completion. Verbal instruction and demonstration provided to ensure proper muscle recruitment.  Therapeutic Exercise Activity 2: Abdominal bracing completed 1x10 with 3 second hold at end range to increase trunk strength to maximize safety and independence during functional tasks of choice.      Outcome Measures:[facility] Daily Activity  Putting on and taking off regular lower body clothing: Total  Bathing (including washing, rinsing, drying): A little  Putting on and taking off regular upper body clothing: A little  Toileting, which includes using toilet, bedpan or urinal: Total  Taking care of personal grooming such as brushing teeth: A little  Eating Meals: A little  Daily Activity - Total Score: 14        Education Documentation  Body Mechanics, taught by SANTOS David at 10/27/2023  5:22 PM.  Learner: Patient  Readiness: Acceptance  Method: Explanation, Demonstration  Response: Needs Reinforcement    ADL Training, taught by SANTOS David at 10/27/2023  5:22 PM.  Learner: Patient  Readiness: Acceptance  Method: Explanation, Demonstration  Response: Needs Reinforcement    Education Comments  No comments found.        OP EDUCATION:  Education  Individual(s) Educated: Patient  Education Provided: Fall precautons, Risk and benefits of OT discussed with patient or other    Goals:  Encounter Problems       Encounter Problems (Active)       OT Goals       Pt will perform functional mobility short household distance at min A level with HDRW (Progressing)       Start:  10/25/23    Expected End:  10/30/23            Pt will complete ADL tasks with Min A using AE as needed, in order to complete self-care tasks.  (Progressing)       Start:  10/25/23    Expected End:  10/30/23            Pt will perform functional  transfers at min A level with RW  (Progressing)       Start:  10/25/23    Expected End:  10/30/23

## 2023-10-27 NOTE — PROGRESS NOTES
Amrita Lopez is a 82 y.o. female on day 2 of admission presenting with Cellulitis of lower extremity, unspecified laterality.    Subjective   Patient seen and examined.  Resting in bed in no acute distress.  Awake alert oriented x 3.  Tired.  Lower extremity pain to touch only.  No fevers chills or sweats.  No other complaints    Objective     Physical Exam  Vitals reviewed.   Constitutional:       General: She is not in acute distress.     Appearance: She is obese. She is not ill-appearing, toxic-appearing or diaphoretic.   HENT:      Head: Normocephalic and atraumatic.      Right Ear: Tympanic membrane normal.      Left Ear: Tympanic membrane normal.      Nose: Nose normal.      Mouth/Throat:      Mouth: Mucous membranes are moist.      Pharynx: Oropharynx is clear.   Eyes:      Extraocular Movements: Extraocular movements intact.      Conjunctiva/sclera: Conjunctivae normal.      Pupils: Pupils are equal, round, and reactive to light.   Cardiovascular:      Rate and Rhythm: Normal rate and regular rhythm.      Pulses: Normal pulses.      Heart sounds: Normal heart sounds.   Pulmonary:      Effort: Pulmonary effort is normal. No respiratory distress.      Breath sounds: Normal breath sounds. No wheezing, rhonchi or rales.   Abdominal:      General: Bowel sounds are normal. There is no distension.      Palpations: Abdomen is soft.      Tenderness: There is no abdominal tenderness. There is no guarding.   Genitourinary:     Comments: Rectal examination deferred  Musculoskeletal:         General: Swelling present. Normal range of motion.      Cervical back: Normal range of motion and neck supple.      Right lower leg: Edema present.      Left lower leg: Edema present.      Comments: Mobility limited   Skin:     General: Skin is warm and dry.      Capillary Refill: Capillary refill takes less than 2 seconds.      Comments: Bilateral lower extremity lymphedema.  Chronic venous stasis.  Generalized erythema.  Left  "lower extremity dressing intact   Neurological:      General: No focal deficit present.      Mental Status: She is alert and oriented to person, place, and time.      Comments: Generalized lower extremity weakness, no focal weakness. Mobility limited   Psychiatric:         Mood and Affect: Mood normal.         Behavior: Behavior normal.       Last Recorded Vitals  Blood pressure 138/52, pulse 77, temperature 37.2 °C (99 °F), temperature source Oral, resp. rate 18, height 1.626 m (5' 4\"), weight 132 kg (290 lb), SpO2 91 %.    Intake/Output last 3 Shifts:  I/O last 3 completed shifts:  In: 1030 (7.8 mL/kg) [P.O.:1030]  Out: 400 (3 mL/kg) [Urine:400 (0.1 mL/kg/hr)]  Weight: 131.5 kg     Relevant Results    Wound culture, pending    Results for orders placed or performed during the hospital encounter of 10/24/23 (from the past 24 hour(s))   Basic Metabolic Panel   Result Value Ref Range    Glucose 120 (H) 65 - 99 mg/dL    Sodium 139 133 - 145 mmol/L    Potassium 3.7 3.4 - 5.1 mmol/L    Chloride 100 97 - 107 mmol/L    Bicarbonate 28 24 - 31 mmol/L    Urea Nitrogen 19 8 - 25 mg/dL    Creatinine 0.70 0.40 - 1.60 mg/dL    eGFR 88 >60 mL/min/1.73m*2    Calcium 9.3 8.5 - 10.4 mg/dL    Anion Gap 11 <=19 mmol/L   CBC   Result Value Ref Range    WBC 8.8 4.4 - 11.3 x10*3/uL    nRBC 0.0 0.0 - 0.0 /100 WBCs    RBC 4.20 4.00 - 5.20 x10*6/uL    Hemoglobin 13.0 12.0 - 16.0 g/dL    Hematocrit 40.3 36.0 - 46.0 %    MCV 96 80 - 100 fL    MCH 31.0 26.0 - 34.0 pg    MCHC 32.3 32.0 - 36.0 g/dL    RDW 13.2 11.5 - 14.5 %    Platelets 263 150 - 450 x10*3/uL    MPV 9.3 7.5 - 11.5 fL     FL modified barium swallow study    Result Date: 10/25/2023  Interpreted By:  Jai Richardson and Brodsky Sheryl STUDY: FL MODIFIED BARIUM SWALLOW STUDY;; 10/25/2023 3:37 pm   INDICATION: Signs/Symptoms:Dysphasia.   COMPARISON: None.   ACCESSION NUMBER(S): [id number]   ORDERING CLINICIAN: VENUS BESS   TECHNIQUE: MBSS completed. Informed verbal " consent obtained prior to completion of exam. Trials of thin, nectar thick, honey thick, puree, soft-solids, and regular solids given. Total fluoroscopy time:  1 minute 32 seconds Radiation exposure (Reference Air Kerma):  53.94 mGy Images:  13 cine images     SLP: Marielle GRIFFIN/SLP Phone/Pager: [phone]   SPEECH FINDINGS: Reason for referral: R/o aspiration Patient hx: Cellulitis of lower extremity, unspecified laterality Lymphedema Hypertension Hyperlipidemia History of CVA Respiratory status: Room air Previous diet: Regular and thin liquids   FINAL SPEECH RECOMMENDATIONS   Diet recommendations/feeding strategies: Solid Consistency: Regular (IDDSI Level 7) Liquid Consistency: Thin (IDDSI Level 0)   Compensatory Strategy Recommendations: Single sips, Slow rate, Small sips Postural Changes and/or Swallow Maneuvers: Upright 90 degrees for all PO intake   Follow-up speech therapy recommended: yes   Short term goals: Pt will: Tolerate regular solids and thin liquids without s/s aspiration Utilize safe swallow strategies with 90% acc  to reduce aspiration risk Long term goals: Pt will: Tolerate regular solids and thin liquids without s/s aspiration   Education provided: Pt/nursing staff educated on results of MBS, recommendations, and safe swallow strategies, ....P and RN able to able to verbalize understanding and agreement,  pt requires reinforcement/assistance from staff/caregivers Treatment Provided today: no     Mechanics of the swallow summary: *Oral phase:   Bolus head @ valleculae with thin, nectar and honey, pyriforms with pudding and solid *Pharyngeal phase:     -Partial anterior hyoid excursion -Partial eppiglottic inversion   SLP impressions with severity rating:  Mild oropharyngeal stage dysphagia, with laryngeal penetration above folds, d/t delayed pharyngeal swallow initiation and decreased airway closure ejected, low risk for aspiration   Thin Liquids (MBSS) Rosenbek's Penetration Aspiration  Scale, Thin Liquids (MBSS): 2. PENETRATION that CLEARS - contrast enter airway, above vocal cords, no residue   Nectar Thick Liquids (MBSS) Rosenbek's Penetration Aspiration Scale, Nectar thick liquids (MBSS):1. NO ASPIRATION & NO PENETRATION - no aspiration, contrast does not enter airway   Honey Thick Liquids (MBSS) Rosenbek's Penetration Aspiration Scale, Honey thick liquids (MBSS): 1. NO ASPIRATION & NO PENETRATION - no aspiration, contrast does not enter airway   Purees (MBSS) Rosenbek's Penetration Aspiration Scale, Purees (MBSS): 1. NO ASPIRATION & NO PENETRATION - no aspiration, contrast does not enter airway   Solids (MBSS) Rosenbek's Penetration Aspiration Scale, Solids (MBSS): 1. NO ASPIRATION & NO PENETRATION - no aspiration, contrast does not enter airway   Speech Therapy section of this report signed by regi GRIFFIN/SLP on 10/25/2023 at 3:50 pm.   RADIOLOGY FINDINGS: There is laryngeal penetration without aspiration with thin liquid barium. No evidence for laryngeal penetration or aspiration noted with nectar, honey, pudding, or cracker consistency barium   Radiology section of this report signed by Dylan.       Laryngeal penetration without aspiration with thin liquid barium.   Please see the details of the speech pathology portion above   MACRO: None   Signed by: Jai Richardson 10/25/2023 6:00 PM Dictation workstation:   [id number]     Scheduled medications  acetaminophen, 500 mg, oral, q8h ANNIE  ammonium lactate, 1 Application, Topical, BID  atorvastatin, 10 mg, oral, Nightly  clopidogrel, 75 mg, oral, Daily  cyanocobalamin, 500 mcg, oral, Daily  enoxaparin, 40 mg, subcutaneous, q12h ANNIE  furosemide, 40 mg, oral, Daily  loratadine, 10 mg, oral, Daily  meloxicam, 7.5 mg, oral, Daily  nystatin, 1 Application, Topical, BID  sennosides, 2 tablet, oral, BID      Continuous medications     PRN medications  PRN medications: acetaminophen **OR** acetaminophen **OR** acetaminophen,  acetaminophen, albuterol, benzocaine-menthol, dextromethorphan-guaifenesin, guaiFENesin, magnesium hydroxide, ondansetron ODT **OR** ondansetron, polyethylene glycol    ASSESSMENT:  Left lower extremity ulceration  Bilateral lower extremity venous stasis  Lymphedema  Morbid Obesity  Hypertension  Hyperlipidemia  History CVA  Type 2 diabetes mellitus    PLAN:  Infectious disease input appreciated.  Off antibiotics.  Follow up wound culture.  Wound care nursing consultation.  Local wound care.  Lower extremity elevation and compression.  P.r.n analgesics.  Encourage protein intake.  Cardiac, ADA diet.  Point of care glucose reviewed.  Monitor point of care glucose AC/HS.  Insulin if needed.  Adjust for glycemic control.  Diet, weight loss, exercise as tolerated.  She is wheelchair bound.  Status speech therapy, modified barium swallow testing.  Diet per recommendations.  PT/OT.  Fall precautions.  Up with assistance only.  Deep vein thrombosis prophylaxis Lovenox subcutaneous.  Supportive care.  Patient reassured.  Case management following for discharge planning.  She presents from [facility] assisted living.  Await discharge arrangements.       Tiffany Salgado, RADHA-CNP

## 2023-10-27 NOTE — PROGRESS NOTES
Amrita Lopez is a 82 y.o. female on day 3 of admission presenting with Cellulitis of lower extremity, unspecified laterality.    Subjective   Patient seen and examined.  Resting in bed in no acute distress.  Awake alert oriented x 3.  No complaints.    Wheezing states she takes Clariten daily at home    Objective     Physical Exam  Vitals reviewed.   Constitutional:       General: She is not in acute distress.     Appearance: She is obese. She is not ill-appearing, toxic-appearing or diaphoretic.   HENT:      Head: Normocephalic and atraumatic.      Right Ear: Tympanic membrane normal.      Left Ear: Tympanic membrane normal.      Nose: Nose normal.      Mouth/Throat:      Mouth: Mucous membranes are moist.      Pharynx: Oropharynx is clear.   Eyes:      Extraocular Movements: Extraocular movements intact.      Conjunctiva/sclera: Conjunctivae normal.      Pupils: Pupils are equal, round, and reactive to light.   Cardiovascular:      Rate and Rhythm: Normal rate and regular rhythm.      Pulses: Normal pulses.      Heart sounds: Normal heart sounds.   Pulmonary:      Effort: Pulmonary effort is normal. No respiratory distress.      Breath sounds: Wheezing present. No rhonchi or rales.      Comments: Intermittent upper airway wheezing  Abdominal:      General: Bowel sounds are normal. There is no distension.      Palpations: Abdomen is soft.      Tenderness: There is no abdominal tenderness. There is no guarding.   Genitourinary:     Comments: Deferred  Musculoskeletal:         General: Swelling present. Normal range of motion.      Cervical back: Normal range of motion and neck supple.      Right lower leg: Edema present.      Left lower leg: Edema present.      Comments: Bilateral lower extremity lymphedema  Decreased lower extremity edema  Mobility limited   Skin:     General: Skin is warm and dry.      Capillary Refill: Capillary refill takes less than 2 seconds.      Comments: Bilateral lower extremity  "lymphedema.  Chronic venous stasis.  Generalized erythema.  Left lower extremity dressing intact.  Removed.  Left lower extremity wound ulcerations noted.  Minimal surrounding erythema.  No significant drainage   Neurological:      General: No focal deficit present.      Mental Status: She is alert and oriented to person, place, and time.      Comments: Generalized lower extremity weakness, no focal weakness. Mobility limited   Psychiatric:         Mood and Affect: Mood normal.         Behavior: Behavior normal.       Last Recorded Vitals  Blood pressure 96/75, pulse 75, temperature 37 °C (98.6 °F), temperature source Oral, resp. rate 16, height 1.626 m (5' 4\"), weight 132 kg (290 lb), SpO2 95 %.    Intake/Output last 3 Shifts:  I/O last 3 completed shifts:  In: 1500 (11.4 mL/kg) [P.O.:1500]  Out: 1690 (12.8 mL/kg) [Urine:1690 (0.4 mL/kg/hr)]  Weight: 131.5 kg     Relevant Results  Left lower extremity wound culture + Pseudomonas pending    Results for orders placed or performed during the hospital encounter of 10/24/23 (from the past 24 hour(s))   POCT GLUCOSE   Result Value Ref Range    POCT Glucose 96 74 - 99 mg/dL     No results found.    Scheduled medications  acetaminophen, 500 mg, oral, q8h ANNIE  ammonium lactate, 1 Application, Topical, BID  atorvastatin, 10 mg, oral, Nightly  clopidogrel, 75 mg, oral, Daily  cyanocobalamin, 500 mcg, oral, Daily  enoxaparin, 40 mg, subcutaneous, q12h ANNIE  furosemide, 40 mg, oral, Daily  ipratropium-albuteroL, 3 mL, nebulization, q6h  [START ON 10/28/2023] loratadine, 10 mg, oral, Daily  meloxicam, 7.5 mg, oral, Daily  nystatin, 1 Application, Topical, BID  sennosides, 2 tablet, oral, BID  sodium hypochlorite, , irrigation, Once      Continuous medications     PRN medications  PRN medications: acetaminophen **OR** acetaminophen **OR** acetaminophen, acetaminophen, albuterol, benzocaine-menthol, dextromethorphan-guaifenesin, guaiFENesin, magnesium hydroxide, ondansetron ODT " **OR** ondansetron, polyethylene glycol    ASSESSMENT:  Left lower extremity ulceration wound + Pseudomonas  Bilateral lower extremity venous stasis  Lymphedema  Morbid obesity  Hypertension  Hyperlipidemia  History CVA  Type 2 diabetes mellitus  Wheezing  Allergies    PLAN:  Add Duoneb treatments, daily Clariten.  Monitor.  Lower extremity wound culture growing Pseudomonas.  Discussed with Infectious disease, Dr. De.  Recommends Dakin's solution with dressing changes.  No further recommendations.  Continue to monitor.  Continue local wound care per wound care nursing.  Lower extremity elevation compression..  Analgesics.  Encourage protein intake.  Cardiac, ADA diet.  Monitor point-of-care glucose AC/HS.  Insulin if needed.  Adjust for glycemic control.  Diet, weight loss, exercise education as tolerated though she is wheelchair-bound.  Diet per speech therapy, modified barium swallow testing.  PT/OT.  Fall precautions.  Up with assistance only.  DVT prophylaxis Lovenox subcutaneous.  GI prophylaxis Ppi Protonix.  Supportive care.  Patient reassured.  Case management following for discharge planning.  She presents from [facility] living.  Await discharge arrangements.  Anticipate discharge when arrangements are made    ADDENDUM:  Case management note reviewed.  Discharge plan to skilled nursing rehabilitation.  Awaiting arrangements.  Anticipate discharge when arrangements are made      Tiffany Salgado, APRN-CNP

## 2023-10-28 PROCEDURE — 94760 N-INVAS EAR/PLS OXIMETRY 1: CPT

## 2023-10-28 PROCEDURE — 2500000001 HC RX 250 WO HCPCS SELF ADMINISTERED DRUGS (ALT 637 FOR MEDICARE OP): Performed by: INTERNAL MEDICINE

## 2023-10-28 PROCEDURE — 2500000004 HC RX 250 GENERAL PHARMACY W/ HCPCS (ALT 636 FOR OP/ED): Performed by: INTERNAL MEDICINE

## 2023-10-28 PROCEDURE — 96372 THER/PROPH/DIAG INJ SC/IM: CPT | Performed by: INTERNAL MEDICINE

## 2023-10-28 PROCEDURE — 94640 AIRWAY INHALATION TREATMENT: CPT

## 2023-10-28 PROCEDURE — 1200000002 HC GENERAL ROOM WITH TELEMETRY DAILY

## 2023-10-28 PROCEDURE — 3490 HC RX 250 GENERAL PHARMACY W/ HCPCS (ALT 636 FOR OP/ED): Performed by: NURSE PRACTITIONER

## 2023-10-28 PROCEDURE — 2500000002 HC RX 250 W HCPCS SELF ADMINISTERED DRUGS (ALT 637 FOR MEDICARE OP, ALT 636 FOR OP/ED): Performed by: NURSE PRACTITIONER

## 2023-10-28 PROCEDURE — 2500000001 HC RX 250 WO HCPCS SELF ADMINISTERED DRUGS (ALT 637 FOR MEDICARE OP): Performed by: NURSE PRACTITIONER

## 2023-10-28 PROCEDURE — 2500000004 HC RX 250 GENERAL PHARMACY W/ HCPCS (ALT 636 FOR OP/ED): Performed by: NURSE PRACTITIONER

## 2023-10-28 PROCEDURE — 9420000001 HC RT PATIENT EDUCATION 5 MIN

## 2023-10-28 RX ORDER — PANTOPRAZOLE SODIUM 40 MG/1
40 TABLET, DELAYED RELEASE ORAL
Start: 2023-10-29 | End: 2023-11-25 | Stop reason: WASHOUT

## 2023-10-28 RX ORDER — ONDANSETRON 4 MG/1
4 TABLET, ORALLY DISINTEGRATING ORAL EVERY 8 HOURS PRN
Qty: 20 TABLET | Refills: 0 | Status: SHIPPED
Start: 2023-10-28

## 2023-10-28 RX ORDER — IPRATROPIUM BROMIDE AND ALBUTEROL SULFATE 2.5; .5 MG/3ML; MG/3ML
3 SOLUTION RESPIRATORY (INHALATION)
Start: 2023-10-28

## 2023-10-28 RX ORDER — POLYETHYLENE GLYCOL 3350 17 G/17G
17 POWDER, FOR SOLUTION ORAL DAILY PRN
Start: 2023-10-28

## 2023-10-28 RX ORDER — SENNOSIDES 8.6 MG/1
2 TABLET ORAL 2 TIMES DAILY
Start: 2023-10-28

## 2023-10-28 RX ADMIN — IPRATROPIUM BROMIDE AND ALBUTEROL SULFATE 3 ML: 2.5; .5 SOLUTION RESPIRATORY (INHALATION) at 07:57

## 2023-10-28 RX ADMIN — NYSTATIN 1 APPLICATION: 100000 POWDER TOPICAL at 08:55

## 2023-10-28 RX ADMIN — Medication 1 APPLICATION: at 22:19

## 2023-10-28 RX ADMIN — CLOPIDOGREL BISULFATE 75 MG: 75 TABLET ORAL at 08:54

## 2023-10-28 RX ADMIN — ENOXAPARIN SODIUM 40 MG: 40 INJECTION SUBCUTANEOUS at 22:00

## 2023-10-28 RX ADMIN — IPRATROPIUM BROMIDE AND ALBUTEROL SULFATE 3 ML: 2.5; .5 SOLUTION RESPIRATORY (INHALATION) at 22:35

## 2023-10-28 RX ADMIN — DAKIN'S SOLUTION 0.125% (QUARTER STRENGTH) 473 ML: 0.12 SOLUTION at 08:54

## 2023-10-28 RX ADMIN — ENOXAPARIN SODIUM 40 MG: 40 INJECTION SUBCUTANEOUS at 08:54

## 2023-10-28 RX ADMIN — SENNOSIDES 17.2 MG: 8.6 TABLET, FILM COATED ORAL at 22:00

## 2023-10-28 RX ADMIN — Medication 1 APPLICATION: at 08:55

## 2023-10-28 RX ADMIN — ACETAMINOPHEN 500 MG: 500 TABLET ORAL at 21:59

## 2023-10-28 RX ADMIN — LORATADINE 10 MG: 10 TABLET ORAL at 08:54

## 2023-10-28 RX ADMIN — NYSTATIN 1 APPLICATION: 100000 POWDER TOPICAL at 22:19

## 2023-10-28 RX ADMIN — MELOXICAM 7.5 MG: 7.5 TABLET ORAL at 08:54

## 2023-10-28 RX ADMIN — PANTOPRAZOLE SODIUM 40 MG: 40 TABLET, DELAYED RELEASE ORAL at 06:13

## 2023-10-28 RX ADMIN — IPRATROPIUM BROMIDE AND ALBUTEROL SULFATE 3 ML: 2.5; .5 SOLUTION RESPIRATORY (INHALATION) at 12:28

## 2023-10-28 RX ADMIN — FUROSEMIDE 40 MG: 40 TABLET ORAL at 08:54

## 2023-10-28 RX ADMIN — CYANOCOBALAMIN TAB 500 MCG 500 MCG: 500 TAB at 08:54

## 2023-10-28 RX ADMIN — ACETAMINOPHEN 500 MG: 500 TABLET ORAL at 06:13

## 2023-10-28 RX ADMIN — SENNOSIDES 17.2 MG: 8.6 TABLET, FILM COATED ORAL at 08:57

## 2023-10-28 RX ADMIN — ATORVASTATIN CALCIUM 10 MG: 10 TABLET, FILM COATED ORAL at 21:59

## 2023-10-28 ASSESSMENT — COGNITIVE AND FUNCTIONAL STATUS - GENERAL
STANDING UP FROM CHAIR USING ARMS: A LOT
TURNING FROM BACK TO SIDE WHILE IN FLAT BAD: TOTAL
HELP NEEDED FOR BATHING: TOTAL
EATING MEALS: A LITTLE
WALKING IN HOSPITAL ROOM: A LOT
DRESSING REGULAR UPPER BODY CLOTHING: TOTAL
MOBILITY SCORE: 8
DAILY ACTIVITIY SCORE: 9
PERSONAL GROOMING: A LOT
CLIMB 3 TO 5 STEPS WITH RAILING: TOTAL
TOILETING: TOTAL
DRESSING REGULAR LOWER BODY CLOTHING: TOTAL
MOVING FROM LYING ON BACK TO SITTING ON SIDE OF FLAT BED WITH BEDRAILS: TOTAL
MOVING TO AND FROM BED TO CHAIR: TOTAL

## 2023-10-28 ASSESSMENT — PAIN SCALES - GENERAL
PAINLEVEL_OUTOF10: 0 - NO PAIN
PAINLEVEL_OUTOF10: 0 - NO PAIN

## 2023-10-28 ASSESSMENT — PAIN - FUNCTIONAL ASSESSMENT
PAIN_FUNCTIONAL_ASSESSMENT: 0-10
PAIN_FUNCTIONAL_ASSESSMENT: FLACC (FACE, LEGS, ACTIVITY, CRY, CONSOLABILITY)

## 2023-10-28 NOTE — PROGRESS NOTES
SNF referral reviewed, patient has precert for [facility].   Provider and bedside RN updated.      Discharge plan NOT secure  DO NOT DC without speaking to care coordination     MD will need to sign/certify the [facility] for skilled rehab prior to discharge

## 2023-10-28 NOTE — PROGRESS NOTES
Amrita Lopez is a 82 y.o. female on day 4 of admission presenting with Cellulitis of lower extremity, unspecified laterality.    Subjective   The patient was seen and examined.  Lying in the bed.  Comfortable.  Did not look in acute distress.  Patient denies any headache or dizziness.  No nausea or vomiting.       Objective     Physical Exam  HEENT:  Head externally atraumatic, no pallor, no icterus, extraocular movements intact, pupils reacting to light, oral mucosa moist and throat clear.  Neck:  Supple, no JVP, no palpable adenopathy or thyromegaly.  No carotid bruit.  Chest:  Clear to auscultation and resonant.  Heart:  Regular rate and rhythm, no murmur or gallop could be appreciated.  Abdomen:  Soft, nontender, bowel sounds present, normoactive, no palpable hepatosplenomegaly.  Extremities:  No edema, pulses present, no cyanosis or clubbing.  CNS:  Patient alert, oriented to time, place and person.  Power 5/5 all over and deep tendon reflexes symmetrical, cranial nerves 2-12 grossly intact.  Skin:  No active rash.  Musculoskeletal:  No joint swelling or erythema, range of movement normal.  Last Recorded Vitals  Heart Rate:  [68-81]   Temp:  [36.5 °C (97.7 °F)-36.9 °C (98.4 °F)]   Resp:  [18]   BP: (135-147)/(54-75)   SpO2:  [94 %-99 %]     Intake/Output last 3 Shifts:  I/O last 3 completed shifts:  In: 1060 (8.1 mL/kg) [P.O.:1060]  Out: 950 (7.2 mL/kg) [Urine:950 (0.2 mL/kg/hr)]  Weight: 131.5 kg     Relevant Results  Susceptibility data from last 90 days.  Collected Specimen Info Organism Aztreonam Cefepime Ceftazidime Ciprofloxacin Gentamicin Levofloxacin Piperacillin/Tazobactam Tobramycin   10/25/23 Tissue/Biopsy from Wound/Tissue Pseudomonas aeruginosa S S S S I S S S     Bacteroides fragilis             Mixed Anaerobic Bacteria             No results found for this or any previous visit (from the past 24 hour(s)).     Current Facility-Administered Medications:     acetaminophen (Tylenol) tablet 650 mg,  650 mg, oral, q4h PRN **OR** acetaminophen (Tylenol) oral liquid 650 mg, 650 mg, oral, q4h PRN **OR** acetaminophen (Tylenol) suppository 650 mg, 650 mg, rectal, q4h PRN, Arnoldo Sanchez MD    acetaminophen (Tylenol) tablet 500 mg, 500 mg, oral, q8h ANNIE, Arnoldo Sanchez MD, 500 mg at 10/28/23 0613    acetaminophen (Tylenol) tablet 650 mg, 650 mg, oral, q6h PRN, Arnoldo Sanchez MD    albuterol 2.5 mg /3 mL (0.083 %) nebulizer solution 2.5 mg, 2.5 mg, nebulization, q2h PRN, Arnoldo Sanchez MD    ammonium lactate (Lac-Hydrin) 12 % lotion 1 Application, 1 Application, Topical, BID, Arnoldo Sanchez MD, 1 Application at 10/28/23 0855    atorvastatin (Lipitor) tablet 10 mg, 10 mg, oral, Nightly, Arnoldo Sanchez MD, 10 mg at 10/27/23 2053    benzocaine-menthol (Cepastat Sore Throat) 15-3.6 mg lozenge 1 lozenge, 1 lozenge, Mouth/Throat, q2h PRN, Arnoldo Sanchez MD    clopidogrel (Plavix) tablet 75 mg, 75 mg, oral, Daily, Arnoldo Sanchez MD, 75 mg at 10/28/23 0854    cyanocobalamin (Vitamin B-12) tablet 500 mcg, 500 mcg, oral, Daily, Arnoldo Sanchez MD, 500 mcg at 10/28/23 0854    dextromethorphan-guaifenesin (Robitussin DM)  mg/5 mL oral liquid 5 mL, 5 mL, oral, q4h PRN, Arnoldo Sanchez MD    enoxaparin (Lovenox) syringe 40 mg, 40 mg, subcutaneous, q12h ANNIE, Arnoldo Sanchez MD, 40 mg at 10/28/23 0854    furosemide (Lasix) tablet 40 mg, 40 mg, oral, Daily, Arnoldo Sanchez MD, 40 mg at 10/28/23 0854    guaiFENesin (Mucinex) 12 hr tablet 600 mg, 600 mg, oral, q12h PRN, Arnoldo Sanchez MD    ipratropium-albuteroL (Duo-Neb) 0.5-2.5 mg/3 mL nebulizer solution 3 mL, 3 mL, nebulization, q6h, DARLENE Elizalde, 3 mL at 10/28/23 1228    loratadine (Claritin) tablet 10 mg, 10 mg, oral, Daily, DARLENE Elizalde, 10 mg at 10/28/23 0854    magnesium hydroxide (Milk of Magnesia) 2,400 mg/10 mL suspension 30 mL, 30 mL, oral, Daily PRN, Arnoldo Sanchez MD     meloxicam (Mobic) tablet 7.5 mg, 7.5 mg, oral, Daily, Arnoldo Sanchez MD, 7.5 mg at 10/28/23 0854    nystatin (Mycostatin) 100,000 unit/gram powder 1 Application, 1 Application, Topical, BID, Arnoldo Sanchez MD, 1 Application at 10/28/23 0855    ondansetron ODT (Zofran-ODT) disintegrating tablet 4 mg, 4 mg, oral, q8h PRN **OR** ondansetron (Zofran) injection 4 mg, 4 mg, intravenous, q8h PRN, Arnoldo Sanchez MD    pantoprazole (ProtoNix) EC tablet 40 mg, 40 mg, oral, Daily before breakfast, DARLENE Elizalde, 40 mg at 10/28/23 0613    polyethylene glycol (Glycolax, Miralax) packet 17 g, 17 g, oral, Daily PRN, Arnoldo Sanchez MD    sennosides (Senokot) tablet 17.2 mg, 2 tablet, oral, BID, Arnoldo Sanchez MD, 17.2 mg at 10/28/23 0857   Assessment/Plan   Principal Problem:    Cellulitis of lower extremity, unspecified laterality  Lymphedema  Morbid obesity  Hypertension  Hyperlipidemia  History of CVA  Diabetes mellitus type 2  Continue current medication.  Supportive care.  Physical therapy Occupational Therapy.  Patient is medically stable.  Patient can be discharged when arrangements made.    Arnoldo Sanchez MD

## 2023-10-28 NOTE — CARE PLAN
Patient remains Aox2 throughout shift. Patient incontinent of bowel and bladder. Changes and turns complete. Pending precert to SNF  Problem: Skin  Goal: Decreased wound size/increased tissue granulation at next dressing change  Outcome: Progressing  Goal: Participates in plan/prevention/treatment measures  Outcome: Progressing  Goal: Prevent/manage excess moisture  Outcome: Progressing  Goal: Prevent/minimize sheer/friction injuries  Outcome: Progressing  Goal: Promote/optimize nutrition  Outcome: Progressing  Goal: Promote skin healing  Outcome: Progressing     Problem: Fall/Injury  Goal: Not fall by end of shift  Outcome: Progressing  Goal: Be free from injury by end of the shift  Outcome: Progressing  Goal: Verbalize understanding of personal risk factors for fall in the hospital  Outcome: Progressing  Goal: Verbalize understanding of risk factor reduction measures to prevent injury from fall in the home  Outcome: Progressing  Goal: Use assistive devices by end of the shift  Outcome: Progressing  Goal: Pace activities to prevent fatigue by end of the shift  Outcome: Progressing     Problem: Respiratory  Goal: Clear secretions with interventions this shift  Outcome: Progressing  Goal: No signs of respiratory distress (eg. Use of accessory muscles. Peds grunting)  Outcome: Progressing  Goal: Patent airway maintained this shift  Outcome: Progressing  Goal: Verbalize decreased shortness of breath this shift  Outcome: Progressing

## 2023-10-29 VITALS
HEIGHT: 64 IN | TEMPERATURE: 98.2 F | WEIGHT: 290 LBS | SYSTOLIC BLOOD PRESSURE: 132 MMHG | OXYGEN SATURATION: 97 % | RESPIRATION RATE: 18 BRPM | DIASTOLIC BLOOD PRESSURE: 57 MMHG | HEART RATE: 76 BPM | BODY MASS INDEX: 49.51 KG/M2

## 2023-10-29 PROBLEM — L03.119 CELLULITIS OF LOWER EXTREMITY, UNSPECIFIED LATERALITY: Status: RESOLVED | Noted: 2023-10-24 | Resolved: 2023-10-29

## 2023-10-29 LAB
B-LACTAMASE ORGANISM ISLT: POSITIVE
BACTERIA SPEC CULT: ABNORMAL
GRAM STN SPEC: ABNORMAL
GRAM STN SPEC: ABNORMAL

## 2023-10-29 PROCEDURE — 94760 N-INVAS EAR/PLS OXIMETRY 1: CPT

## 2023-10-29 PROCEDURE — 2500000001 HC RX 250 WO HCPCS SELF ADMINISTERED DRUGS (ALT 637 FOR MEDICARE OP): Performed by: INTERNAL MEDICINE

## 2023-10-29 PROCEDURE — 96372 THER/PROPH/DIAG INJ SC/IM: CPT | Performed by: INTERNAL MEDICINE

## 2023-10-29 PROCEDURE — 2500000004 HC RX 250 GENERAL PHARMACY W/ HCPCS (ALT 636 FOR OP/ED): Performed by: INTERNAL MEDICINE

## 2023-10-29 PROCEDURE — 94664 DEMO&/EVAL PT USE INHALER: CPT

## 2023-10-29 PROCEDURE — 2500000002 HC RX 250 W HCPCS SELF ADMINISTERED DRUGS (ALT 637 FOR MEDICARE OP, ALT 636 FOR OP/ED): Performed by: NURSE PRACTITIONER

## 2023-10-29 PROCEDURE — 3490 HC RX 250 GENERAL PHARMACY W/ HCPCS (ALT 636 FOR OP/ED): Performed by: NURSE PRACTITIONER

## 2023-10-29 PROCEDURE — 94640 AIRWAY INHALATION TREATMENT: CPT

## 2023-10-29 PROCEDURE — 2500000004 HC RX 250 GENERAL PHARMACY W/ HCPCS (ALT 636 FOR OP/ED): Performed by: NURSE PRACTITIONER

## 2023-10-29 RX ADMIN — ACETAMINOPHEN 500 MG: 500 TABLET ORAL at 06:11

## 2023-10-29 RX ADMIN — IPRATROPIUM BROMIDE AND ALBUTEROL SULFATE 3 ML: 2.5; .5 SOLUTION RESPIRATORY (INHALATION) at 03:04

## 2023-10-29 RX ADMIN — ACETAMINOPHEN 500 MG: 500 TABLET ORAL at 13:08

## 2023-10-29 RX ADMIN — LORATADINE 10 MG: 10 TABLET ORAL at 08:08

## 2023-10-29 RX ADMIN — IPRATROPIUM BROMIDE AND ALBUTEROL SULFATE 3 ML: 2.5; .5 SOLUTION RESPIRATORY (INHALATION) at 12:22

## 2023-10-29 RX ADMIN — FUROSEMIDE 40 MG: 40 TABLET ORAL at 08:08

## 2023-10-29 RX ADMIN — PANTOPRAZOLE SODIUM 40 MG: 40 TABLET, DELAYED RELEASE ORAL at 08:08

## 2023-10-29 RX ADMIN — NYSTATIN 1 APPLICATION: 100000 POWDER TOPICAL at 08:11

## 2023-10-29 RX ADMIN — IPRATROPIUM BROMIDE AND ALBUTEROL SULFATE 3 ML: 2.5; .5 SOLUTION RESPIRATORY (INHALATION) at 07:46

## 2023-10-29 RX ADMIN — CYANOCOBALAMIN TAB 500 MCG 500 MCG: 500 TAB at 08:08

## 2023-10-29 RX ADMIN — MELOXICAM 7.5 MG: 7.5 TABLET ORAL at 08:08

## 2023-10-29 RX ADMIN — CLOPIDOGREL BISULFATE 75 MG: 75 TABLET ORAL at 08:08

## 2023-10-29 RX ADMIN — ENOXAPARIN SODIUM 40 MG: 40 INJECTION SUBCUTANEOUS at 08:08

## 2023-10-29 RX ADMIN — SENNOSIDES 17.2 MG: 8.6 TABLET, FILM COATED ORAL at 08:08

## 2023-10-29 RX ADMIN — Medication 1 APPLICATION: at 08:11

## 2023-10-29 ASSESSMENT — COGNITIVE AND FUNCTIONAL STATUS - GENERAL
WALKING IN HOSPITAL ROOM: A LOT
MOVING FROM LYING ON BACK TO SITTING ON SIDE OF FLAT BED WITH BEDRAILS: TOTAL
TURNING FROM BACK TO SIDE WHILE IN FLAT BAD: TOTAL
CLIMB 3 TO 5 STEPS WITH RAILING: TOTAL
MOVING TO AND FROM BED TO CHAIR: TOTAL
STANDING UP FROM CHAIR USING ARMS: A LOT
MOBILITY SCORE: 8

## 2023-10-29 ASSESSMENT — PAIN SCALES - GENERAL
PAINLEVEL_OUTOF10: 0 - NO PAIN
PAINLEVEL_OUTOF10: 0 - NO PAIN

## 2023-10-29 ASSESSMENT — PAIN - FUNCTIONAL ASSESSMENT: PAIN_FUNCTIONAL_ASSESSMENT: FLACC (FACE, LEGS, ACTIVITY, CRY, CONSOLABILITY)

## 2023-10-29 NOTE — NURSING NOTE
Assumed care of pt. Bed side shift report received from previous nurse. Patient observed lying in bed with HOB elevated, A&Ox2.  Respiration regular and unlabored on room air. No concerns or needs voiced at this time. Call light within reach.bed in lowest position, locked. Bed alarm activated for pt safety. Will continue to monitor.

## 2023-10-29 NOTE — CARE PLAN
Problem: Skin  Goal: Decreased wound size/increased tissue granulation at next dressing change  Outcome: Progressing  Goal: Participates in plan/prevention/treatment measures  Outcome: Progressing  Goal: Prevent/manage excess moisture  Outcome: Progressing  Goal: Prevent/minimize sheer/friction injuries  Outcome: Progressing  Goal: Promote/optimize nutrition  Outcome: Progressing  Goal: Promote skin healing  Outcome: Progressing     Problem: Fall/Injury  Goal: Not fall by end of shift  Outcome: Progressing  Goal: Be free from injury by end of the shift  Outcome: Progressing  Goal: Verbalize understanding of personal risk factors for fall in the hospital  Outcome: Progressing  Goal: Verbalize understanding of risk factor reduction measures to prevent injury from fall in the home  Outcome: Progressing  Goal: Use assistive devices by end of the shift  Outcome: Progressing  Goal: Pace activities to prevent fatigue by end of the shift  Outcome: Progressing     Problem: Respiratory  Goal: Clear secretions with interventions this shift  Outcome: Progressing  Goal: No signs of respiratory distress (eg. Use of accessory muscles. Peds grunting)  Outcome: Progressing  Goal: Patent airway maintained this shift  Outcome: Progressing  Goal: Verbalize decreased shortness of breath this shift  Outcome: Progressing   The patient's goals for the shift include      The clinical goals for the shift include hemodynamically stable. Maintain safety. Wound care.    Over the shift, the patient did not make progress toward the following goals. Barriers to progression include . Recommendations to address these barriers include .

## 2023-10-29 NOTE — CARE PLAN
The patient's goals for the shift include      The clinical goals for the shift include maintain safety    Problem: Skin  Goal: Decreased wound size/increased tissue granulation at next dressing change  Outcome: Progressing  Goal: Participates in plan/prevention/treatment measures  Outcome: Progressing  Goal: Prevent/manage excess moisture  Outcome: Progressing  Goal: Prevent/minimize sheer/friction injuries  Outcome: Progressing  Goal: Promote/optimize nutrition  Outcome: Progressing  Goal: Promote skin healing  Outcome: Progressing     Problem: Fall/Injury  Goal: Not fall by end of shift  Outcome: Progressing  Goal: Be free from injury by end of the shift  Outcome: Progressing  Goal: Verbalize understanding of personal risk factors for fall in the hospital  Outcome: Progressing  Goal: Verbalize understanding of risk factor reduction measures to prevent injury from fall in the home  Outcome: Progressing  Goal: Use assistive devices by end of the shift  Outcome: Progressing  Goal: Pace activities to prevent fatigue by end of the shift  Outcome: Progressing     Problem: Respiratory  Goal: Clear secretions with interventions this shift  Outcome: Progressing  Goal: No signs of respiratory distress (eg. Use of accessory muscles. Peds grunting)  Outcome: Progressing  Goal: Patent airway maintained this shift  Outcome: Progressing  Goal: Verbalize decreased shortness of breath this shift  Outcome: Progressing

## 2023-10-29 NOTE — DISCHARGE SUMMARY
Discharge Diagnosis  Cellulitis of lower extremity, unspecified laterality    Issues Requiring Follow-Up    Discharge Meds     Your medication list        START taking these medications        Instructions Last Dose Given Next Dose Due   ipratropium-albuteroL 0.5-2.5 mg/3 mL nebulizer solution  Commonly known as: Duo-Neb      Take 3 mL by nebulization every 6 hours.       ondansetron ODT 4 mg disintegrating tablet  Commonly known as: Zofran-ODT      Take 1 tablet (4 mg) by mouth every 8 hours if needed for nausea or vomiting.       pantoprazole 40 mg EC tablet  Commonly known as: ProtoNix  Start taking on: October 29, 2023      Take 1 tablet (40 mg) by mouth once daily in the morning. Take before meals. Do not crush, chew, or split. Do not start before October 29, 2023.       polyethylene glycol 17 gram packet  Commonly known as: Glycolax, Miralax      Take 17 g by mouth once daily as needed (constipation).       sennosides 8.6 mg tablet  Commonly known as: Senokot      Take 2 tablets (17.2 mg) by mouth 2 times a day.              CHANGE how you take these medications        Instructions Last Dose Given Next Dose Due   acetaminophen 325 mg tablet  Commonly known as: Tylenol  What changed: Another medication with the same name was removed. Continue taking this medication, and follow the directions you see here.                  CONTINUE taking these medications        Instructions Last Dose Given Next Dose Due   albuterol 108 (90 Base) MCG/ACT inhaler           ammonium lactate 12 % lotion  Commonly known as: Lac-Hydrin           atorvastatin 10 mg tablet  Commonly known as: Lipitor           clopidogrel 75 mg tablet  Commonly known as: Plavix           cyanocobalamin 1,000 mcg tablet  Commonly known as: Vitamin B-12           furosemide 40 mg tablet  Commonly known as: Lasix           loratadine 10 mg tablet  Commonly known as: Claritin           magnesium hydroxide 2,400 mg/10 mL suspension suspension  Commonly  known as: Milk of Magnesia           meloxicam 7.5 mg tablet  Commonly known as: Mobic           nystatin 100,000 unit/gram powder  Commonly known as: Mycostatin                     Where to Get Your Medications        Information about where to get these medications is not yet available    Ask your nurse or doctor about these medications  ipratropium-albuteroL 0.5-2.5 mg/3 mL nebulizer solution  ondansetron ODT 4 mg disintegrating tablet  pantoprazole 40 mg EC tablet  polyethylene glycol 17 gram packet  sennosides 8.6 mg tablet         Test Results Pending At Discharge  Pending Labs       Order Current Status    Tissue/Wound Culture/Smear Preliminary result            Hospital Course   Patient was at treated with antibiotic.  Patient was seen by ID.  ID resolved.  Most likely patient has fluid overload and leg edema and lymphedema.  There was no leukocytosis or erythema.  Antibiotic discontinued.  Wound culture grew Pseudomonas.  Dakin's solution was advised.  Patient being discharged to rehab in a stable  Condition    Pertinent Physical Exam At Time of Discharge  Physical Exam    Outpatient Follow-Up  No future appointments.      Arnoldo Sanchez MD

## 2023-10-29 NOTE — PROGRESS NOTES
Amrita Lopez is a 83 y.o. female on day 5 of admission presenting with Cellulitis of lower extremity, unspecified laterality.    Subjective   The patient was seen and examined.  Lying in the bed.  Comfortable did not appear acute distress.  Patient denies any headache or dizziness.  No nausea or vomiting.       Objective     Physical Exam  HEENT:  Head externally atraumatic, no pallor, no icterus, extraocular movements intact, pupils reacting to light, oral mucosa moist and throat clear.  Neck:  Supple, no JVP, no palpable adenopathy or thyromegaly.  No carotid bruit.  Chest:  Clear to auscultation and resonant.  Heart:  Regular rate and rhythm, no murmur or gallop could be appreciated.  Abdomen:  Soft, nontender, bowel sounds present, normoactive, no palpable hepatosplenomegaly.  Extremities:  No edema, pulses present, no cyanosis or clubbing.  CNS:  Patient alert, oriented to time, place and person.  Power 5/5 all over and deep tendon reflexes symmetrical, cranial nerves 2-12 grossly intact.  Skin:  No active rash.  Musculoskeletal:  No joint swelling or erythema, range of movement normal.  Last Recorded Vitals  Heart Rate:  [70-85]   Temp:  [36.4 °C (97.5 °F)-36.9 °C (98.4 °F)]   Resp:  [16-18]   BP: (106-159)/(45-70)   SpO2:  [91 %-98 %]     Intake/Output last 3 Shifts:  I/O last 3 completed shifts:  In: 460 (3.5 mL/kg) [P.O.:460]  Out: - (0 mL/kg)   Weight: 131.5 kg     Relevant Results  Susceptibility data from last 90 days.  Collected Specimen Info Organism Aztreonam Cefepime Ceftazidime Ciprofloxacin Gentamicin Levofloxacin Piperacillin/Tazobactam Tobramycin   10/25/23 Tissue/Biopsy from Wound/Tissue Pseudomonas aeruginosa S S S S I S S S     Bacteroides fragilis             Mixed Anaerobic Bacteria             No results found for this or any previous visit (from the past 24 hour(s)).     Current Facility-Administered Medications:     acetaminophen (Tylenol) tablet 650 mg, 650 mg, oral, q4h PRN **OR**  acetaminophen (Tylenol) oral liquid 650 mg, 650 mg, oral, q4h PRN **OR** acetaminophen (Tylenol) suppository 650 mg, 650 mg, rectal, q4h PRN, Arnoldo Sanchez MD    acetaminophen (Tylenol) tablet 500 mg, 500 mg, oral, q8h ANNIE, Arnoldo Sanchez MD, 500 mg at 10/29/23 1308    acetaminophen (Tylenol) tablet 650 mg, 650 mg, oral, q6h PRN, Arnoldo Sanchez MD    albuterol 2.5 mg /3 mL (0.083 %) nebulizer solution 2.5 mg, 2.5 mg, nebulization, q2h PRN, Arnoldo Sanchez MD    ammonium lactate (Lac-Hydrin) 12 % lotion 1 Application, 1 Application, Topical, BID, Arnoldo Sanchez MD, 1 Application at 10/29/23 0811    atorvastatin (Lipitor) tablet 10 mg, 10 mg, oral, Nightly, Arnoldo Sanchez MD, 10 mg at 10/28/23 2159    benzocaine-menthol (Cepastat Sore Throat) 15-3.6 mg lozenge 1 lozenge, 1 lozenge, Mouth/Throat, q2h PRN, Arnoldo Sanchez MD    clopidogrel (Plavix) tablet 75 mg, 75 mg, oral, Daily, Arnoldo Sanchez MD, 75 mg at 10/29/23 0808    cyanocobalamin (Vitamin B-12) tablet 500 mcg, 500 mcg, oral, Daily, Arnoldo Sanchez MD, 500 mcg at 10/29/23 0808    dextromethorphan-guaifenesin (Robitussin DM)  mg/5 mL oral liquid 5 mL, 5 mL, oral, q4h PRN, Arnoldo Sanchez MD    enoxaparin (Lovenox) syringe 40 mg, 40 mg, subcutaneous, q12h ANNIE, Arnoldo Sanchez MD, 40 mg at 10/29/23 0808    furosemide (Lasix) tablet 40 mg, 40 mg, oral, Daily, Arnoldo Sanchez MD, 40 mg at 10/29/23 0808    guaiFENesin (Mucinex) 12 hr tablet 600 mg, 600 mg, oral, q12h PRN, Arnolod Sanchez MD    ipratropium-albuteroL (Duo-Neb) 0.5-2.5 mg/3 mL nebulizer solution 3 mL, 3 mL, nebulization, q6h, DARLENE Elizalde, 3 mL at 10/29/23 1222    loratadine (Claritin) tablet 10 mg, 10 mg, oral, Daily, DARLENE Elizalde, 10 mg at 10/29/23 0808    magnesium hydroxide (Milk of Magnesia) 2,400 mg/10 mL suspension 30 mL, 30 mL, oral, Daily PRN, Arnoldo Sanchez MD    meloxicam (Mobic) tablet 7.5 mg,  7.5 mg, oral, Daily, Arnoldo Sanchez MD, 7.5 mg at 10/29/23 0808    nystatin (Mycostatin) 100,000 unit/gram powder 1 Application, 1 Application, Topical, BID, Arnoldo Sanchez MD, 1 Application at 10/29/23 0811    ondansetron ODT (Zofran-ODT) disintegrating tablet 4 mg, 4 mg, oral, q8h PRN **OR** ondansetron (Zofran) injection 4 mg, 4 mg, intravenous, q8h PRN, Arnoldo Sanchez MD    pantoprazole (ProtoNix) EC tablet 40 mg, 40 mg, oral, Daily before breakfast, RADHA Elizalde-CNP, 40 mg at 10/29/23 0808    polyethylene glycol (Glycolax, Miralax) packet 17 g, 17 g, oral, Daily PRN, Arnoldo Sanchez MD    sennosides (Senokot) tablet 17.2 mg, 2 tablet, oral, BID, Arnoldo Sanchez MD, 17.2 mg at 10/29/23 0808   Assessment/Plan   Principal Problem:    Cellulitis of lower extremity, unspecified laterality    Lymphedema  Morbid obese  Hypertension  Hyperlipidemia  History of CVA  Diabetes mellitus type 2    Plan: Continue current medication.  Supportive care.  Patient medically stable for discharge when arrangements made.         Arnoldo Sanchez MD

## 2023-10-29 NOTE — NURSING NOTE
No changes from previous assessment noted. All safety measures maintained. Pt resting in bed comfortably. Telemetry leads connected and reading on monitor 80 BPM sinus rhythm.. External female catheter in place, connected to wall continuous suction, aamir,clear urine in drainage canister noted.  Call light in reach.

## 2023-11-22 ENCOUNTER — NURSING HOME VISIT (OUTPATIENT)
Dept: POST ACUTE CARE | Facility: EXTERNAL LOCATION | Age: 83
End: 2023-11-22
Payer: MEDICARE

## 2023-11-22 DIAGNOSIS — M15.9 PRIMARY OSTEOARTHRITIS INVOLVING MULTIPLE JOINTS: ICD-10-CM

## 2023-11-22 DIAGNOSIS — I10 PRIMARY HYPERTENSION: ICD-10-CM

## 2023-11-22 DIAGNOSIS — R26.2 DIFFICULTY IN WALKING: ICD-10-CM

## 2023-11-22 DIAGNOSIS — F19.10 OTHER PSYCHOACTIVE SUBSTANCE ABUSE, UNCOMPLICATED (MULTI): ICD-10-CM

## 2023-11-22 DIAGNOSIS — F01.50 VASCULAR DEMENTIA, UNSPECIFIED DEMENTIA SEVERITY, UNSPECIFIED WHETHER BEHAVIORAL, PSYCHOTIC, OR MOOD DISTURBANCE OR ANXIETY (MULTI): ICD-10-CM

## 2023-11-22 DIAGNOSIS — M62.81 MUSCLE WEAKNESS (GENERALIZED): Primary | ICD-10-CM

## 2023-11-22 DIAGNOSIS — E66.01 OBESITY, MORBID (MULTI): ICD-10-CM

## 2023-11-22 PROCEDURE — 99350 HOME/RES VST EST HIGH MDM 60: CPT

## 2023-11-22 ASSESSMENT — PAIN SCALES - GENERAL: PAINLEVEL: 0-NO PAIN

## 2023-11-25 VITALS
DIASTOLIC BLOOD PRESSURE: 80 MMHG | HEART RATE: 74 BPM | RESPIRATION RATE: 16 BRPM | OXYGEN SATURATION: 99 % | SYSTOLIC BLOOD PRESSURE: 140 MMHG

## 2023-11-25 PROBLEM — M62.81 MUSCLE WEAKNESS (GENERALIZED): Status: ACTIVE | Noted: 2020-11-04

## 2023-11-25 PROBLEM — E66.01 OBESITY, MORBID (MULTI): Status: ACTIVE | Noted: 2023-11-25

## 2023-11-25 RX ORDER — OMEPRAZOLE 20 MG/1
20 TABLET, DELAYED RELEASE ORAL
COMMUNITY

## 2023-11-25 ASSESSMENT — ENCOUNTER SYMPTOMS
ARTHRALGIAS: 1
DIFFICULTY URINATING: 0
DIZZINESS: 0
ENDOCRINE NEGATIVE: 1
RHINORRHEA: 0
PSYCHIATRIC NEGATIVE: 1
NERVOUS/ANXIOUS: 0
FEVER: 0
SLEEP DISTURBANCE: 0
COUGH: 0
CONSTIPATION: 0
EYES NEGATIVE: 1
HEMATOLOGIC/LYMPHATIC NEGATIVE: 1
WEAKNESS: 1
SHORTNESS OF BREATH: 1
FATIGUE: 0
HEADACHES: 0
CHILLS: 0
GASTROINTESTINAL NEGATIVE: 1

## 2023-11-25 NOTE — PROGRESS NOTES
"Subjective   Patient ID: Amrita Lopez is a 83 y.o. female who is assisted living/ home patient being seen and evaluated for a post acute follow up from hospital/rehab.     HPI     Post acute follow up visit made to resident in apartment at assisted living. Pt was hospitalized for treatment of significant cellulitis to [facility], and sent to rehab. Pt has now returned to [place]. Pt oriented x1, comfortable, up in recliner after a 3 person heavy transfer. Pt denies pain. Pt denies fever, chills, headache, and dizziness. Pt states appetite is good, and continues to go down to the dining room for meals. Pt had to be encouraged to come down for breakfast today, due to not wanting to get out of bed. Pt denies chest pain and admits to sob on exertion with transfers that resolves with rest.  Pt is incontinent of bowel and bladder due to unable to transfer to toilet. Cellulitis has resolved to bilateral lower extremities. Pt is no longer utilizing walker in room, and dependent on wheelchair for mobility. Pt unable to assist and pivot with transfers. Pt would benefit from a sit to stand lift for transfers for the safety of caregivers and patient. Pt states \"I don't want a tay, those hurt\". Pt denies sleep disturbances since returning to AL. Pt is a current smoker. Pt did not smoke when away from AL, but has resumed since returning. Pt is not interested in quitting at this time. Pt has made several attempts to quit.     Current Outpatient Medications on File Prior to Visit   Medication Sig Dispense Refill    acetaminophen (Tylenol) 325 mg tablet Take 2 tablets (650 mg) by mouth every 6 hours if needed for mild pain (1 - 3), headaches or fever (temp greater than 38.0 C).      albuterol 108 (90 Base) MCG/ACT inhaler Inhale 2 puffs every 2 hours if needed for wheezing or shortness of breath.      ammonium lactate (Lac-Hydrin) 12 % lotion Apply 1 Application topically 2 times a day.      atorvastatin (Lipitor) 10 mg tablet Take 1 " tablet (10 mg) by mouth once daily.      clopidogrel (Plavix) 75 mg tablet Take 1 tablet (75 mg) by mouth once daily.      cyanocobalamin (Vitamin B-12) 1,000 mcg tablet Take 0.5 tablets (500 mcg) by mouth once daily.      furosemide (Lasix) 40 mg tablet Take 1 tablet (40 mg) by mouth once daily.      ipratropium-albuteroL (Duo-Neb) 0.5-2.5 mg/3 mL nebulizer solution Take 3 mL by nebulization every 6 hours.      loratadine (Claritin) 10 mg tablet Take 1 tablet (10 mg) by mouth once daily.      magnesium hydroxide (Milk of Magnesia) 2,400 mg/10 mL suspension suspension Take 30 mL by mouth once daily as needed for constipation.      meloxicam (Mobic) 7.5 mg tablet Take 1 tablet (7.5 mg) by mouth once daily.      nystatin (Mycostatin) 100,000 unit/gram powder Apply 1 Application topically 2 times a day.      ondansetron ODT (Zofran-ODT) 4 mg disintegrating tablet Take 1 tablet (4 mg) by mouth every 8 hours if needed for nausea or vomiting. 20 tablet 0    polyethylene glycol (Glycolax, Miralax) 17 gram packet Take 17 g by mouth once daily as needed (constipation).      sennosides (Senokot) 8.6 mg tablet Take 2 tablets (17.2 mg) by mouth 2 times a day. (Patient taking differently: Take 2 tablets (17.2 mg) by mouth 2 times a day as needed for constipation.)      omeprazole OTC (PriLOSEC OTC) 20 mg EC tablet Take 1 tablet (20 mg) by mouth once daily in the morning. Take before meals. Do not crush, chew, or split.      [DISCONTINUED] pantoprazole (ProtoNix) 40 mg EC tablet Take 1 tablet (40 mg) by mouth once daily in the morning. Take before meals. Do not crush, chew, or split. Do not start before October 29, 2023.       No current facility-administered medications on file prior to visit.    * Medications reconciled from discharge paperwork.     Review of Systems   Constitutional:  Negative for chills, fatigue and fever.   HENT: Negative.  Negative for congestion and rhinorrhea.    Eyes: Negative.  Negative for visual  disturbance.   Respiratory:  Positive for shortness of breath. Negative for cough.         SOB with exertion    Cardiovascular:  Positive for leg swelling.   Gastrointestinal: Negative.  Negative for constipation.   Endocrine: Negative.    Genitourinary: Negative.  Negative for difficulty urinating.        Incontinent d/t unable to transfer.    Musculoskeletal:  Positive for arthralgias and gait problem.   Neurological:  Positive for weakness. Negative for dizziness and headaches.   Hematological: Negative.    Psychiatric/Behavioral: Negative.  Negative for sleep disturbance. The patient is not nervous/anxious.        Objective   /80 (BP Location: Left arm, Patient Position: Sitting, BP Cuff Size: Adult)   Pulse 74   Resp 16   SpO2 99%     Physical Exam  Constitutional:       General: She is not in acute distress.     Appearance: She is obese. She is not ill-appearing.   HENT:      Head: Normocephalic.      Nose: Nose normal.      Mouth/Throat:      Mouth: Mucous membranes are moist.   Eyes:      Conjunctiva/sclera: Conjunctivae normal.      Pupils: Pupils are equal, round, and reactive to light.   Cardiovascular:      Rate and Rhythm: Normal rate and regular rhythm.      Pulses: Normal pulses.      Heart sounds: Normal heart sounds.   Pulmonary:      Effort: No respiratory distress.      Breath sounds: Normal breath sounds.   Abdominal:      General: Bowel sounds are normal.   Musculoskeletal:      Cervical back: Normal range of motion.      Right lower leg: Edema present.      Left lower leg: Edema present.      Comments: Trace edema without pitting   Skin:     General: Skin is warm.      Comments: Bilateral lower legs dry and scaly.    Neurological:      Mental Status: She is alert. Mental status is at baseline.      Comments: Oriented x1   Psychiatric:         Behavior: Behavior normal.         Assessment/Plan   Diagnoses and all orders for this visit:  Muscle weakness (generalized)  Comments:  Refer to  PT to evaluate and treat. Pt is a 3 person heavy transfer. Unable to pivot on own with transfers. Evaluate need for sit to stand lift.  Vascular dementia, unspecified dementia severity, unspecified whether behavioral, psychotic, or mood disturbance or anxiety (CMS/[facility])  Comments:  Monitor for cognitive decline.  Primary hypertension  Comments:  Blood pressure controlled. Continue to monitor.  Primary osteoarthritis involving multiple joints  Comments:  Pain controlled.  Other psychoactive substance abuse, uncomplicated (CMS/[facility])  Comments:  Pt not interested in quiting smoking.  Difficulty in walking  Comments:  Pt with OA to bilateral knees. Obese. Utlitizes wheelchair for mobility.  Obesity, morbid (CMS/[facility])

## 2023-11-30 ENCOUNTER — NURSING HOME VISIT (OUTPATIENT)
Dept: POST ACUTE CARE | Facility: EXTERNAL LOCATION | Age: 83
End: 2023-11-30
Payer: MEDICARE

## 2023-11-30 DIAGNOSIS — L89.152 PRESSURE ULCER OF COCCYGEAL REGION, STAGE 2 (MULTI): ICD-10-CM

## 2023-11-30 DIAGNOSIS — J44.1 CHRONIC OBSTRUCTIVE PULMONARY DISEASE WITH ACUTE EXACERBATION (MULTI): Primary | ICD-10-CM

## 2023-11-30 PROCEDURE — 99349 HOME/RES VST EST MOD MDM 40: CPT

## 2023-12-01 ENCOUNTER — TELEPHONE (OUTPATIENT)
Dept: PRIMARY CARE | Facility: CLINIC | Age: 83
End: 2023-12-01
Payer: MEDICARE

## 2023-12-01 PROBLEM — J44.1 CHRONIC OBSTRUCTIVE PULMONARY DISEASE WITH ACUTE EXACERBATION (MULTI): Status: ACTIVE | Noted: 2023-04-07

## 2023-12-01 PROBLEM — L89.152 PRESSURE ULCER OF COCCYGEAL REGION, STAGE 2 (MULTI): Status: ACTIVE | Noted: 2023-12-01

## 2023-12-01 PROBLEM — J45.901 REACTIVE AIRWAY DISEASE WITH ACUTE EXACERBATION (HHS-HCC): Status: ACTIVE | Noted: 2023-12-01

## 2023-12-01 PROBLEM — J44.1 CHRONIC OBSTRUCTIVE PULMONARY DISEASE WITH ACUTE EXACERBATION (MULTI): Status: ACTIVE | Noted: 2023-12-01

## 2023-12-01 PROBLEM — J45.901 REACTIVE AIRWAY DISEASE WITH ACUTE EXACERBATION (HHS-HCC): Status: ACTIVE | Noted: 2023-04-07

## 2023-12-01 ASSESSMENT — ENCOUNTER SYMPTOMS
ROS GI COMMENTS: INCONTINENT OF BOWEL
DIFFICULTY URINATING: 0
FEVER: 0
DIARRHEA: 0
APPETITE CHANGE: 0
RHINORRHEA: 0
ARTHRALGIAS: 1
COUGH: 1
CONSTIPATION: 0
WEAKNESS: 1
MYALGIAS: 1
SLEEP DISTURBANCE: 0
NERVOUS/ANXIOUS: 0
SHORTNESS OF BREATH: 1
DIZZINESS: 0
FATIGUE: 0
NAUSEA: 0
CONFUSION: 0
VOMITING: 0

## 2023-12-01 NOTE — PROGRESS NOTES
Subjective   Patient ID: Amrita Lopez is a 83 y.o. female who is assisted living/ home patient being seen and evaluated for F2F for hospital bed.     HPI   Pt visited in apartment at [facility]. Pt up in recliner, not sleeping in bed due to not being able to elevate hob and cannot breath easily.  Pt unable to transfer out of bed without minimum of 3-4 caregivers,  unable to bare weight on own and reposition independently for incontinence changes. Pt with a stage 2 on coccyx size of a quarter. Pt with a recent fall over weekend in apartment from recliner that required 6 people to get patient off the floor. No injury was sustained at this time. Pt requires hospital bed for body positioning, alleviate skin breakdown, and free breathing due to COPD diagnosis in April 2023.     Review of Systems   Constitutional:  Negative for appetite change, fatigue and fever.   HENT:  Negative for congestion and rhinorrhea.    Eyes:  Negative for visual disturbance.   Respiratory:  Positive for cough and shortness of breath.         SOB with exertion.    Cardiovascular:  Positive for leg swelling. Negative for chest pain.   Gastrointestinal:  Negative for constipation, diarrhea, nausea and vomiting.        Incontinent of bowel    Genitourinary:  Negative for difficulty urinating.        Incontinent of bladder   Musculoskeletal:  Positive for arthralgias, gait problem and myalgias.   Neurological:  Positive for weakness. Negative for dizziness.   Psychiatric/Behavioral:  Negative for confusion and sleep disturbance. The patient is not nervous/anxious.        Objective   There were no vitals taken for this visit.    Physical Exam  Constitutional:       General: She is not in acute distress.     Appearance: She is obese.      Comments: Wheelchair bound   HENT:      Head: Normocephalic.      Nose: Nose normal.      Mouth/Throat:      Mouth: Mucous membranes are dry.   Eyes:      Conjunctiva/sclera: Conjunctivae normal.       Pupils: Pupils are equal, round, and reactive to light.   Musculoskeletal:      Cervical back: Normal range of motion.      Right lower leg: Edema present.      Left lower leg: Edema present.   Skin:     General: Skin is dry.      Capillary Refill: Capillary refill takes less than 2 seconds.      Findings: Erythema and wound present.             Comments: Stage II pressure ulcer size of quarter. Wound being tended to by [employer].    Neurological:      Mental Status: She is alert. Mental status is at baseline.      Motor: Weakness present.      Comments: Oriented x1   Psychiatric:         Mood and Affect: Mood normal.         Behavior: Behavior normal. Behavior is cooperative.         Assessment/Plan   Problem List Items Addressed This Visit       Chronic obstructive pulmonary disease with acute exacerbation (CMS/HCC) - Primary     Hospital bed ordered, to keep HOB greater than 30 degrees to allow for free breathing.          Relevant Orders    Hospital Bed    Pressure ulcer of coccygeal region, stage 2 (CMS/HCC)     Hospital bed ordered for frequent repositioning to alleviate skin break down. Body habitus not feasible for ordinary bed. Pt unable to reposition on own.            Goals    None

## 2023-12-01 NOTE — ASSESSMENT & PLAN NOTE
Hospital bed ordered for frequent repositioning to alleviate skin break down. Body habitus not feasible for ordinary bed. Pt unable to reposition on own.

## 2023-12-01 NOTE — TELEPHONE ENCOUNTER
Ree Lazar asking if order for hospital bed and notes can be faxed to them. She believes you saw patient yesterday. Fax [phone]. Phone: [phone]

## 2023-12-07 PROCEDURE — G0180 MD CERTIFICATION HHA PATIENT: HCPCS

## 2023-12-08 ENCOUNTER — NURSING HOME VISIT (OUTPATIENT)
Dept: POST ACUTE CARE | Facility: EXTERNAL LOCATION | Age: 83
End: 2023-12-08
Payer: MEDICARE

## 2023-12-08 ENCOUNTER — TELEPHONE (OUTPATIENT)
Dept: POST ACUTE CARE | Facility: EXTERNAL LOCATION | Age: 83
End: 2023-12-08

## 2023-12-08 ENCOUNTER — TELEPHONE (OUTPATIENT)
Dept: PRIMARY CARE | Facility: CLINIC | Age: 83
End: 2023-12-08

## 2023-12-08 DIAGNOSIS — R26.81 GAIT INSTABILITY: Primary | ICD-10-CM

## 2023-12-08 PROCEDURE — 99348 HOME/RES VST EST LOW MDM 30: CPT

## 2023-12-08 ASSESSMENT — ENCOUNTER SYMPTOMS
DIZZINESS: 0
WEAKNESS: 1
VOMITING: 0
SHORTNESS OF BREATH: 1
ARTHRALGIAS: 1
COUGH: 1
DIARRHEA: 0
CONSTIPATION: 0
CONFUSION: 0
FATIGUE: 0
DIFFICULTY URINATING: 0
ROS GI COMMENTS: INCONTINENT OF BOWEL
NAUSEA: 0
MYALGIAS: 1
NERVOUS/ANXIOUS: 0
FEVER: 0
RHINORRHEA: 0
SLEEP DISTURBANCE: 0
APPETITE CHANGE: 0

## 2023-12-08 NOTE — TELEPHONE ENCOUNTER
[facility] Hospital Bed. Notes and orders faxed. Bedside [employer]. Note and order faxed.

## 2023-12-08 NOTE — TELEPHONE ENCOUNTER
Telephone call returned. Pt with a stage II, no drainage. Orders given for cleanse with NS, hydrocolloid 3 x weekly, and PRN if becomes soiled.

## 2023-12-08 NOTE — PROGRESS NOTES
"Subjective   Patient ID: Amrita Lopez is a 83 y.o. female who is being seen and evaluated at [facility] for a F2F for a wheelchair and bedside commode.     HPI   Pt visited in apartment. Pt was borrowing a 20\"wheelchair, however due to obesity with a weight of 290 lbs, and height of 5\"4, chair was not accommodating to weight and the sides of the chair buckled, and wheels became off balance. Pt is now confined to her recliner, and unable to come down for meals, transfer to toilet, and perform other ADL's due to being wheelchair bound. Pt is unable to bare weight, and walk with a walker due to weight and osteoarthritis in bilateral knees. Pt requires a bariatric wheelchair or wheelchair greater than 20\" to accommodate body habitus. Pt is able to self propel in wheelchair. Pt requires wheelchair to perform ADL's, and due to gait instability.     Pt requires bedside commode due to unable to transfer to bathroom safely due to gait instability and 4 person heavy transfer. Pt is incontinent of bowel and bladder due to not being able to transfer to bathroom in apartment.     Review of Systems   Constitutional:  Negative for appetite change, fatigue and fever.   HENT:  Negative for congestion and rhinorrhea.    Eyes:  Negative for visual disturbance.   Respiratory:  Positive for cough and shortness of breath.         SOB with exertion.    Cardiovascular:  Positive for leg swelling. Negative for chest pain.   Gastrointestinal:  Negative for constipation, diarrhea, nausea and vomiting.        Incontinent of bowel    Genitourinary:  Negative for difficulty urinating.        Incontinent of bladder   Musculoskeletal:  Positive for arthralgias, gait problem and myalgias.   Neurological:  Positive for weakness. Negative for dizziness.   Psychiatric/Behavioral:  Negative for confusion and sleep disturbance. The patient is not nervous/anxious.        Objective   There were no vitals taken for this visit.    Physical " Exam  Constitutional:       General: She is not in acute distress.     Appearance: She is obese.      Comments: Wheelchair bound   HENT:      Head: Normocephalic.      Nose: Nose normal.      Mouth/Throat:      Mouth: Mucous membranes are dry.   Eyes:      Conjunctiva/sclera: Conjunctivae normal.      Pupils: Pupils are equal, round, and reactive to light.   Musculoskeletal:      Cervical back: Normal range of motion.      Right lower leg: Edema present.      Left lower leg: Edema present.   Skin:     General: Skin is dry.      Capillary Refill: Capillary refill takes less than 2 seconds.      Findings: Erythema and wound present.             Comments: Stage II pressure ulcer size of quarter. Wound being tended to by [facility].    Neurological:      Mental Status: She is alert. Mental status is at baseline.      Motor: Weakness present.      Comments: Oriented x1   Psychiatric:         Mood and Affect: Mood normal.         Behavior: Behavior normal. Behavior is cooperative.         Assessment/Plan   Diagnoses and all orders for this visit:  Gait instability  -     Wheelchair  -     General supply request Bedside Commode      Patient Active Problem List    Diagnosis Date Noted    Pressure ulcer of coccygeal region, stage 2 (CMS/HCC) 12/01/2023    Obesity, morbid (CMS/HCC) 11/25/2023    Acute pain of left knee 10/20/2023    Anxiety 10/20/2023    Dementia (CMS/HCC) 10/20/2023    Difficulty in walking 10/20/2023    Dyspnea 10/20/2023    Excessive daytime sleepiness 10/20/2023    Generalized pain 10/20/2023    Hyperlipidemia 10/20/2023    Hypertension 10/20/2023    Major depressive disorder, single episode, unspecified 10/20/2023    Osteoarthritis 10/20/2023    Recurrent falls 10/20/2023    Cerebrovascular accident (CMS/HCC) 10/20/2023    Stroke (CMS/HCC) 10/20/2023    Tobacco use disorder 10/20/2023    Toxic metabolic encephalopathy 10/20/2023    Vitamin D deficiency 10/20/2023    Weakness as late effect of  cerebrovascular accident (CVA) 10/20/2023    Weight gain 10/20/2023    Cellulitis and abscess of left leg 10/11/2023    Reactive airway disease with acute exacerbation 04/07/2023    Chronic obstructive pulmonary disease with acute exacerbation (CMS/HCC) 04/07/2023    Muscle weakness (generalized) 11/04/2020         Secondary Defect Width (In Cm): 3

## 2023-12-10 PROBLEM — I47.10 SUPRAVENTRICULAR TACHYCARDIA (CMS-HCC): Status: ACTIVE | Noted: 2023-12-10

## 2023-12-10 PROBLEM — I63.9 CEREBRAL INFARCTION, UNSPECIFIED (MULTI): Status: ACTIVE | Noted: 2020-11-04
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
## 2023-12-10 PROBLEM — H61.22 IMPACTED CERUMEN OF LEFT EAR: Status: ACTIVE | Noted: 2023-12-10

## 2023-12-10 PROBLEM — G47.10 INCREASED SLEEPING: Status: ACTIVE | Noted: 2023-12-10

## 2023-12-27 ENCOUNTER — NURSING HOME VISIT (OUTPATIENT)
Dept: POST ACUTE CARE | Facility: EXTERNAL LOCATION | Age: 83
End: 2023-12-27
Payer: MEDICARE

## 2023-12-27 DIAGNOSIS — B02.9 HERPES ZOSTER WITHOUT COMPLICATION: Primary | ICD-10-CM

## 2023-12-27 DIAGNOSIS — B36.9 FUNGAL RASH OF TRUNK: ICD-10-CM

## 2023-12-27 PROCEDURE — 99348 HOME/RES VST EST LOW MDM 30: CPT

## 2023-12-27 RX ORDER — VALACYCLOVIR HYDROCHLORIDE 1 G/1
1000 TABLET, FILM COATED ORAL 3 TIMES DAILY
Qty: 21 TABLET | Refills: 0 | Status: SHIPPED | OUTPATIENT
Start: 2023-12-27 | End: 2024-01-03

## 2023-12-27 ASSESSMENT — ENCOUNTER SYMPTOMS
LIGHT-HEADEDNESS: 0
HEMATOLOGIC/LYMPHATIC NEGATIVE: 1
WEAKNESS: 1
FEVER: 0
NAUSEA: 0
CONSTIPATION: 0
CHILLS: 0
DYSURIA: 0
DIZZINESS: 0
PSYCHIATRIC NEGATIVE: 1
NERVOUS/ANXIOUS: 0
ENDOCRINE NEGATIVE: 1
SLEEP DISTURBANCE: 0
DIARRHEA: 0
SHORTNESS OF BREATH: 0
EYES NEGATIVE: 1
COUGH: 0
FATIGUE: 0

## 2023-12-27 NOTE — PROGRESS NOTES
Subjective   Patient ID: Amrita Lopez is a 83 y.o. female who is assisted living/ home patient being seen and evaluated for multiple blisters on abdomen.     HPI   Pt visited in apartment, up in wheelchair, oriented x1, and denies pain with exception of blisters to abdomen. Pt unable to rate pain, just states they are bothersome. Pt unclear when they first appeared. Nursing reports within the last couple days. Pt with vesicular clusters across abdomen. Pt admits to tingling on abdomen prior to first appearance. Pt states she thought she has a hx of shingles but is uncertain due to cognition. Pt states son gets them often.     Review of Systems   Constitutional:  Negative for chills, fatigue and fever.   HENT: Negative.  Negative for congestion.    Eyes: Negative.  Negative for visual disturbance.   Respiratory:  Negative for cough and shortness of breath.    Cardiovascular:  Positive for leg swelling.   Gastrointestinal:  Negative for constipation, diarrhea and nausea.   Endocrine: Negative.    Genitourinary: Negative.  Negative for dysuria.   Musculoskeletal:  Positive for gait problem.   Skin:  Positive for rash.   Allergic/Immunologic: Negative for immunocompromised state.   Neurological:  Positive for weakness. Negative for dizziness and light-headedness.   Hematological: Negative.    Psychiatric/Behavioral: Negative.  Negative for sleep disturbance. The patient is not nervous/anxious.        Objective   There were no vitals taken for this visit.    Physical Exam  Constitutional:       General: She is not in acute distress.     Appearance: She is obese.      Comments: Wheelchair bound   HENT:      Head: Normocephalic.      Nose: Nose normal.      Mouth/Throat:      Mouth: Mucous membranes are dry.   Eyes:      Conjunctiva/sclera: Conjunctivae normal.      Pupils: Pupils are equal, round, and reactive to light.   Musculoskeletal:      Cervical back: Normal range of motion.      Right lower leg: Edema present.       Left lower leg: Edema present.   Skin:     General: Skin is dry.      Capillary Refill: Capillary refill takes less than 2 seconds.      Findings: Erythema present.             Comments: Erythema and moisture between folds under axillary.  Clusters of vesicular lesions across abdomen.    Neurological:      Mental Status: She is alert. Mental status is at baseline.      Motor: Weakness present.      Comments: Oriented x1   Psychiatric:         Mood and Affect: Mood normal.         Behavior: Behavior normal. Behavior is cooperative.         Assessment/Plan   Diagnoses and all orders for this visit:  Herpes zoster without complication  -     valACYclovir (Valtrex) 1 gram tablet; Take 1 tablet (1,000 mg) by mouth 3 times a day for 7 days.  Fungal rash of trunk  Comments:  Apply nystatin powder under folds beneath bilateral axilla BID, and apply interdry daily to absorb moisture.           Excision Depth: adipose tissue

## 2024-01-03 ENCOUNTER — NURSING HOME VISIT (OUTPATIENT)
Dept: POST ACUTE CARE | Facility: EXTERNAL LOCATION | Age: 84
End: 2024-01-03
Payer: MEDICARE

## 2024-01-03 DIAGNOSIS — B02.9 HERPES ZOSTER WITHOUT COMPLICATION: Primary | ICD-10-CM

## 2024-01-03 PROCEDURE — 99348 HOME/RES VST EST LOW MDM 30: CPT

## 2024-01-03 NOTE — LETTER
Patient: Amrita Lopez  : 1940    Encounter Date: 2024    Subjective  Patient ID: Amrita Lopez is a 83 y.o. female who is assisted living/ home patient being seen and evaluated at [facility] for follow up for shingles to abdomen.     HPI   Pt visited in apartment, oriented x1, comfortable and denies pain. Pt sitting up in recliner, continues to not sleep in hospital bed that was ordered. Pt with an episode of shingles a week ago. Today blisters have crusted over, and are dry. Pt admits pain and tingling is now gone. Pt denies fever, chills, and headache. Blisters have not  spread to other parts of body.     Review of Systems   Constitutional:  Negative for chills, fatigue and fever.   HENT: Negative.  Negative for congestion.    Eyes: Negative.  Negative for visual disturbance.   Respiratory:  Negative for cough and shortness of breath.    Cardiovascular:  Positive for leg swelling.   Gastrointestinal:  Negative for constipation, diarrhea and nausea.   Endocrine: Negative.    Genitourinary: Negative.  Negative for dysuria.   Musculoskeletal:  Positive for gait problem.   Skin:  Positive for rash.   Allergic/Immunologic: Negative for immunocompromised state.   Neurological:  Positive for weakness. Negative for dizziness and light-headedness.   Hematological: Negative.    Psychiatric/Behavioral: Negative.  Negative for sleep disturbance. The patient is not nervous/anxious.        Objective  There were no vitals taken for this visit.    Physical Exam  Constitutional:       General: She is not in acute distress.     Appearance: She is obese.      Comments: Wheelchair bound   HENT:      Head: Normocephalic.      Nose: Nose normal.      Mouth/Throat:      Mouth: Mucous membranes are dry.   Eyes:      Conjunctiva/sclera: Conjunctivae normal.      Pupils: Pupils are equal, round, and reactive to light.   Musculoskeletal:      Cervical back: Normal range of motion.      Right lower leg: Edema  present.      Left lower leg: Edema present.   Skin:     General: Skin is dry.      Capillary Refill: Capillary refill takes less than 2 seconds.      Findings: Erythema present.             Comments: Erythema and moisture between folds under axillary.  Blisters on abdomen are crusted over. No drainage.    Neurological:      Mental Status: She is alert. Mental status is at baseline.      Motor: Weakness present.      Comments: Oriented x1   Psychiatric:         Mood and Affect: Mood normal.         Behavior: Behavior normal. Behavior is cooperative.         Assessment/Plan  Diagnoses and all orders for this visit:  Herpes zoster without complication  Comments:  Resolving, pt with one remaining day on Valtrex.            Electronically Signed By: RADHA Donovan-CNP   1/4/24  2:35 PM

## 2024-01-04 ASSESSMENT — ENCOUNTER SYMPTOMS
DYSURIA: 0
COUGH: 0
NAUSEA: 0
CHILLS: 0
WEAKNESS: 1
EYES NEGATIVE: 1
HEMATOLOGIC/LYMPHATIC NEGATIVE: 1
SHORTNESS OF BREATH: 0
NERVOUS/ANXIOUS: 0
DIZZINESS: 0
FEVER: 0
SLEEP DISTURBANCE: 0
FATIGUE: 0
ENDOCRINE NEGATIVE: 1
PSYCHIATRIC NEGATIVE: 1
DIARRHEA: 0
CONSTIPATION: 0
LIGHT-HEADEDNESS: 0

## 2024-01-04 NOTE — PROGRESS NOTES
Subjective   Patient ID: Amrita Lopez is a 83 y.o. female who is assisted living/ home patient being seen and evaluated at [facility] for follow up for shingles to abdomen.     HPI   Pt visited in apartment, oriented x1, comfortable and denies pain. Pt sitting up in recliner, continues to not sleep in hospital bed that was ordered. Pt with an episode of shingles a week ago. Today blisters have crusted over, and are dry. Pt admits pain and tingling is now gone. Pt denies fever, chills, and headache. Blisters have not  spread to other parts of body.     Review of Systems   Constitutional:  Negative for chills, fatigue and fever.   HENT: Negative.  Negative for congestion.    Eyes: Negative.  Negative for visual disturbance.   Respiratory:  Negative for cough and shortness of breath.    Cardiovascular:  Positive for leg swelling.   Gastrointestinal:  Negative for constipation, diarrhea and nausea.   Endocrine: Negative.    Genitourinary: Negative.  Negative for dysuria.   Musculoskeletal:  Positive for gait problem.   Skin:  Positive for rash.   Allergic/Immunologic: Negative for immunocompromised state.   Neurological:  Positive for weakness. Negative for dizziness and light-headedness.   Hematological: Negative.    Psychiatric/Behavioral: Negative.  Negative for sleep disturbance. The patient is not nervous/anxious.        Objective   There were no vitals taken for this visit.    Physical Exam  Constitutional:       General: She is not in acute distress.     Appearance: She is obese.      Comments: Wheelchair bound   HENT:      Head: Normocephalic.      Nose: Nose normal.      Mouth/Throat:      Mouth: Mucous membranes are dry.   Eyes:      Conjunctiva/sclera: Conjunctivae normal.      Pupils: Pupils are equal, round, and reactive to light.   Musculoskeletal:      Cervical back: Normal range of motion.      Right lower leg: Edema present.      Left lower leg: Edema present.   Skin:     General: Skin is  dry.      Capillary Refill: Capillary refill takes less than 2 seconds.      Findings: Erythema present.             Comments: Erythema and moisture between folds under axillary.  Blisters on abdomen are crusted over. No drainage.    Neurological:      Mental Status: She is alert. Mental status is at baseline.      Motor: Weakness present.      Comments: Oriented x1   Psychiatric:         Mood and Affect: Mood normal.         Behavior: Behavior normal. Behavior is cooperative.         Assessment/Plan   Diagnoses and all orders for this visit:  Herpes zoster without complication  Comments:  Resolving, pt with one remaining day on Valtrex.

## 2024-01-09 ENCOUNTER — TELEPHONE (OUTPATIENT)
Dept: PRIMARY CARE | Facility: CLINIC | Age: 84
End: 2024-01-09
Payer: MEDICARE

## 2024-01-09 NOTE — TELEPHONE ENCOUNTER
Patient seen by Tashia , Speech Therapist for dysphagia, will have Speech therapy 1x per week x3 weeks.

## 2024-01-22 ENCOUNTER — TELEPHONE (OUTPATIENT)
Dept: PRIMARY CARE | Facility: CLINIC | Age: 84
End: 2024-01-22
Payer: MEDICARE

## 2024-01-22 NOTE — TELEPHONE ENCOUNTER
Ree phoned the office to request an order for a Shekhar Lift for transfers for this patient, she reported patient will also require an encounter with  documentation stating rational why the Shekhar Lift is needed.

## 2024-01-24 ENCOUNTER — NURSING HOME VISIT (OUTPATIENT)
Dept: POST ACUTE CARE | Facility: EXTERNAL LOCATION | Age: 84
End: 2024-01-24
Payer: MEDICARE

## 2024-01-24 DIAGNOSIS — E66.01 OBESITY, MORBID (MULTI): Primary | ICD-10-CM

## 2024-01-24 DIAGNOSIS — R53.1 WEAKNESS AS LATE EFFECT OF CEREBROVASCULAR ACCIDENT (CVA): ICD-10-CM

## 2024-01-24 DIAGNOSIS — M62.81 MUSCLE WEAKNESS (GENERALIZED): ICD-10-CM

## 2024-01-24 DIAGNOSIS — R63.5 WEIGHT GAIN: ICD-10-CM

## 2024-01-24 DIAGNOSIS — I69.998 WEAKNESS AS LATE EFFECT OF CEREBROVASCULAR ACCIDENT (CVA): ICD-10-CM

## 2024-01-24 PROCEDURE — 99349 HOME/RES VST EST MOD MDM 40: CPT

## 2024-01-24 NOTE — LETTER
"Patient: Amrita Lopez  : 1940    Encounter Date: 2024    Subjective  Patient ID: Amrita Lopez is a 83 y.o. female who is assisted living/ home patient being seen at [facility] and evaluated for a F2F for a tay lift.     HPI   Pt visited in assisted living for a face to face visit for tay lift. Pt wheelchair bound, unable to transfer without assistance of 4 or more people d/t weakness and obesity. Pt is sedentary and non-compliant with lifestyle modifications. Pt denies headache, dizziness, n/v, fever, and chills. Pt states appetite is good. Pt denies chest pain and sob at rest, admits to sob with exertion at times. Pt is incontinent of bowel and bladder. Pt with a hx of falls, and is currently wheelchair bound. Pt not smoking as much, and may also have contributed to weight gain. Pt would be confined to bed without a tay lift. Pt requires a aty lift to transfer from bed to wheelchair for safety of patient and staff.     Review of Systems   Constitutional:  Negative for chills, fatigue and fever.   HENT: Negative.  Negative for congestion and rhinorrhea.    Eyes: Negative.  Negative for visual disturbance.   Respiratory:  Negative for cough and shortness of breath.    Cardiovascular:  Positive for leg swelling.   Gastrointestinal:  Negative for constipation, diarrhea and nausea.   Endocrine: Negative.    Genitourinary: Negative.  Negative for dysuria.        Incontinent of bowel and bladder   Musculoskeletal:  Positive for gait problem and myalgias.   Allergic/Immunologic: Negative for immunocompromised state.   Neurological:  Positive for weakness. Negative for dizziness, light-headedness and headaches.   Hematological: Negative.    Psychiatric/Behavioral: Negative.  Negative for confusion and sleep disturbance. The patient is not nervous/anxious.        Objective  Ht 1.626 m (5' 4\")   Wt 136 kg (300 lb 12.8 oz)   BMI 51.63 kg/m²     Physical Exam  Constitutional:       " General: She is not in acute distress.     Appearance: She is obese.      Comments: Wheelchair bound   HENT:      Head: Normocephalic.      Nose: Nose normal.      Mouth/Throat:      Mouth: Mucous membranes are dry.   Eyes:      Conjunctiva/sclera: Conjunctivae normal.      Pupils: Pupils are equal, round, and reactive to light.   Pulmonary:      Effort: Pulmonary effort is normal. No respiratory distress.   Musculoskeletal:      Cervical back: Normal range of motion.      Right lower leg: Edema present.      Left lower leg: Edema present.   Skin:     General: Skin is dry.      Capillary Refill: Capillary refill takes less than 2 seconds.      Findings: Rash present.             Comments: Erythema (fungal) between folds under axillary.     Neurological:      Mental Status: She is alert. Mental status is at baseline.      Motor: Weakness present.      Comments: Oriented x1   Psychiatric:         Mood and Affect: Mood normal.         Behavior: Behavior is cooperative.         Assessment/Plan  Diagnoses and all orders for this visit:  Obesity, morbid (CMS/HCC)  Comments:  Pt requires a shekhar lift for safety of transfers.  Orders:  -     General supply request Shekhar Lift w/ standard sling  Weakness as late effect of cerebrovascular accident (CVA)  -     General supply request Shekhar Lift w/ standard sling  Muscle weakness (generalized)  Weight gain  Comments:  CMP, CBC, TSH, Hgba1c, Pro-BNP, ordered, reviewed, and wnl.            Electronically Signed By: RADHA Donovan-CNP   1/27/24  3:40 PM

## 2024-01-27 VITALS — WEIGHT: 293 LBS | HEIGHT: 64 IN | BODY MASS INDEX: 50.02 KG/M2

## 2024-01-27 PROBLEM — L89.152 PRESSURE ULCER OF COCCYGEAL REGION, STAGE 2 (MULTI): Status: RESOLVED | Noted: 2023-12-01 | Resolved: 2024-01-27

## 2024-01-27 PROBLEM — F19.10 OTHER PSYCHOACTIVE SUBSTANCE ABUSE, UNCOMPLICATED (MULTI): Status: ACTIVE | Noted: 2024-01-27

## 2024-01-27 ASSESSMENT — ENCOUNTER SYMPTOMS
RHINORRHEA: 0
LIGHT-HEADEDNESS: 0
WEAKNESS: 1
FEVER: 0
SLEEP DISTURBANCE: 0
COUGH: 0
DIZZINESS: 0
NAUSEA: 0
MYALGIAS: 1
NERVOUS/ANXIOUS: 0
ENDOCRINE NEGATIVE: 1
SHORTNESS OF BREATH: 0
PSYCHIATRIC NEGATIVE: 1
EYES NEGATIVE: 1
FATIGUE: 0
DYSURIA: 0
CONFUSION: 0
CHILLS: 0
DIARRHEA: 0
HEADACHES: 0
CONSTIPATION: 0
HEMATOLOGIC/LYMPHATIC NEGATIVE: 1

## 2024-01-27 NOTE — PROGRESS NOTES
"Subjective   Patient ID: Amrita Lopez is a 83 y.o. female who is assisted living/ home patient being seen at [facility] and evaluated for a F2F for a tay lift.     HPI   Pt visited in assisted living for a face to face visit for tay lift. Pt wheelchair bound, unable to transfer without assistance of 4 or more people d/t weakness and obesity. Pt is sedentary and non-compliant with lifestyle modifications. Pt denies headache, dizziness, n/v, fever, and chills. Pt states appetite is good. Pt denies chest pain and sob at rest, admits to sob with exertion at times. Pt is incontinent of bowel and bladder. Pt with a hx of falls, and is currently wheelchair bound. Pt not smoking as much, and may also have contributed to weight gain. Pt would be confined to bed without a tay lift. Pt requires a tay lift to transfer from bed to wheelchair for safety of patient and staff.     Review of Systems   Constitutional:  Negative for chills, fatigue and fever.   HENT: Negative.  Negative for congestion and rhinorrhea.    Eyes: Negative.  Negative for visual disturbance.   Respiratory:  Negative for cough and shortness of breath.    Cardiovascular:  Positive for leg swelling.   Gastrointestinal:  Negative for constipation, diarrhea and nausea.   Endocrine: Negative.    Genitourinary: Negative.  Negative for dysuria.        Incontinent of bowel and bladder   Musculoskeletal:  Positive for gait problem and myalgias.   Allergic/Immunologic: Negative for immunocompromised state.   Neurological:  Positive for weakness. Negative for dizziness, light-headedness and headaches.   Hematological: Negative.    Psychiatric/Behavioral: Negative.  Negative for confusion and sleep disturbance. The patient is not nervous/anxious.        Objective   Ht 1.626 m (5' 4\")   Wt 136 kg (300 lb 12.8 oz)   BMI 51.63 kg/m²     Physical Exam  Constitutional:       General: She is not in acute distress.     Appearance: She is obese.      " Comments: Wheelchair bound   HENT:      Head: Normocephalic.      Nose: Nose normal.      Mouth/Throat:      Mouth: Mucous membranes are dry.   Eyes:      Conjunctiva/sclera: Conjunctivae normal.      Pupils: Pupils are equal, round, and reactive to light.   Pulmonary:      Effort: Pulmonary effort is normal. No respiratory distress.   Musculoskeletal:      Cervical back: Normal range of motion.      Right lower leg: Edema present.      Left lower leg: Edema present.   Skin:     General: Skin is dry.      Capillary Refill: Capillary refill takes less than 2 seconds.      Findings: Rash present.             Comments: Erythema (fungal) between folds under axillary.     Neurological:      Mental Status: She is alert. Mental status is at baseline.      Motor: Weakness present.      Comments: Oriented x1   Psychiatric:         Mood and Affect: Mood normal.         Behavior: Behavior is cooperative.         Assessment/Plan   Diagnoses and all orders for this visit:  Obesity, morbid (CMS/HCC)  Comments:  Pt requires a shekhar lift for safety of transfers.  Orders:  -     General supply request Shekhar Lift w/ standard sling  Weakness as late effect of cerebrovascular accident (CVA)  -     General supply request Shekhar Lift w/ standard sling  Muscle weakness (generalized)  Weight gain  Comments:  CMP, CBC, TSH, Hgba1c, Pro-BNP, ordered, reviewed, and wnl.

## 2024-02-05 PROCEDURE — G0179 MD RECERTIFICATION HHA PT: HCPCS

## 2024-02-07 ENCOUNTER — NURSING HOME VISIT (OUTPATIENT)
Dept: POST ACUTE CARE | Facility: EXTERNAL LOCATION | Age: 84
End: 2024-02-07
Payer: MEDICARE

## 2024-02-07 DIAGNOSIS — R19.7 DIARRHEA, UNSPECIFIED TYPE: ICD-10-CM

## 2024-02-07 DIAGNOSIS — R11.2 NAUSEA AND VOMITING, UNSPECIFIED VOMITING TYPE: Primary | ICD-10-CM

## 2024-02-07 PROCEDURE — 99348 HOME/RES VST EST LOW MDM 30: CPT

## 2024-02-07 NOTE — LETTER
"Patient: Amrita Lopez  : 1940    Encounter Date: 2024    Subjective  Patient ID: Amrita Lopez is a 83 y.o. female who is assisted living/ home patient being seen at [facility] for follow up after an ER visit.    ER Follow-up  Associated symptoms include myalgias, nausea, vomiting and weakness. Pertinent negatives include no chills, congestion, coughing, fatigue, fever or headaches.      Pt visited in apartment after a visit to ER for BLE edema. Pt was dx with COPD in ER, and returned on albuterol treatments, and prednisone. Today reports of patient with nausea/vomiting and diarrhea.  Multiple residents with same symptoms. Pt up in recliner, denies diarrhea. Pt states \"I haven't been to the bathroom to know if I have diarrhea\". Pt is chair/bed bound and requires transfers with a tay. Pt admits to nausea and vomiting that began today. Pt states she has vomited twice small amounts. Pt states it is clear liquid. Pt states she ate breakfast without difficulty. Pt denies fever, chills, headache, cough and dizziness. Pt denies chest pain and sob at rest and exertion. Pt denies constipation and diarrhea at this time.     Review of Systems   Constitutional:  Negative for chills, fatigue and fever.   HENT: Negative.  Negative for congestion and rhinorrhea.    Eyes: Negative.  Negative for visual disturbance.   Respiratory:  Negative for cough and shortness of breath.    Cardiovascular:  Positive for leg swelling.   Gastrointestinal:  Positive for nausea and vomiting. Negative for constipation and diarrhea.   Endocrine: Negative.    Genitourinary: Negative.  Negative for dysuria.        Incontinent of bowel and bladder   Musculoskeletal:  Positive for gait problem and myalgias.   Allergic/Immunologic: Negative for immunocompromised state.   Neurological:  Positive for weakness. Negative for dizziness, light-headedness and headaches.   Hematological: Negative.    Psychiatric/Behavioral: " Negative.  Negative for confusion and sleep disturbance. The patient is not nervous/anxious.        Objective  There were no vitals taken for this visit.    Physical Exam  Constitutional:       General: She is not in acute distress.     Appearance: She is obese.      Comments: Wheelchair bound   HENT:      Head: Normocephalic.      Nose: Nose normal.      Mouth/Throat:      Mouth: Mucous membranes are dry.   Eyes:      Conjunctiva/sclera: Conjunctivae normal.      Pupils: Pupils are equal, round, and reactive to light.   Cardiovascular:      Rate and Rhythm: Normal rate and regular rhythm.      Pulses: Normal pulses.      Heart sounds: Normal heart sounds.   Pulmonary:      Effort: Pulmonary effort is normal. No respiratory distress.      Breath sounds: No wheezing or rhonchi.   Abdominal:      General: Bowel sounds are normal.   Musculoskeletal:      Cervical back: Normal range of motion.      Right lower leg: Edema present.      Left lower leg: Edema present.   Skin:     General: Skin is dry.      Capillary Refill: Capillary refill takes less than 2 seconds.      Findings: Rash present.             Comments: Erythema (fungal) between folds under axillary.     Neurological:      Mental Status: She is alert. Mental status is at baseline.      Motor: Weakness present.      Comments: Oriented x1   Psychiatric:         Mood and Affect: Mood normal.         Behavior: Behavior is cooperative.         Assessment/Plan  Diagnoses and all orders for this visit:  Nausea and vomiting, unspecified vomiting type  Comments:  Continue zofran as needed.  Diarrhea, unspecified type  -     loperamide (Imodium A-D) 2 mg tablet; Take 1 tablet (2 mg) by mouth 4 times a day as needed for diarrhea. Take 2 tablets (4mg) po with first loose stool, and 1 tablet (2mg) with each additional loose stool. Not to exceed 16mg/day.            Electronically Signed By: DARLENE Donovan   2/10/24  1:55 AM

## 2024-02-10 RX ORDER — LOPERAMIDE HCL 2 MG
2 TABLET ORAL 4 TIMES DAILY PRN
Qty: 30 TABLET | Refills: 0
Start: 2024-02-10

## 2024-02-10 ASSESSMENT — ENCOUNTER SYMPTOMS
VOMITING: 1
WEAKNESS: 1
HEMATOLOGIC/LYMPHATIC NEGATIVE: 1
HEADACHES: 0
FEVER: 0
DIARRHEA: 0
SHORTNESS OF BREATH: 0
MYALGIAS: 1
CHILLS: 0
EYES NEGATIVE: 1
DIZZINESS: 0
NAUSEA: 1
DYSURIA: 0
FATIGUE: 0
RHINORRHEA: 0
CONFUSION: 0
LIGHT-HEADEDNESS: 0
PSYCHIATRIC NEGATIVE: 1
ENDOCRINE NEGATIVE: 1
SLEEP DISTURBANCE: 0
COUGH: 0
CONSTIPATION: 0
NERVOUS/ANXIOUS: 0

## 2024-02-10 NOTE — PROGRESS NOTES
"Subjective   Patient ID: Amrita Lopez is a 83 y.o. female who is assisted living/ home patient being seen at [facility] for follow up after an ER visit.    ER Follow-up  Associated symptoms include myalgias, nausea, vomiting and weakness. Pertinent negatives include no chills, congestion, coughing, fatigue, fever or headaches.      Pt visited in apartment after a visit to ER for BLE edema. Pt was dx with COPD in ER, and returned on albuterol treatments, and prednisone. Today reports of patient with nausea/vomiting and diarrhea.  Multiple residents with same symptoms. Pt up in recliner, denies diarrhea. Pt states \"I haven't been to the bathroom to know if I have diarrhea\". Pt is chair/bed bound and requires transfers with a tay. Pt admits to nausea and vomiting that began today. Pt states she has vomited twice small amounts. Pt states it is clear liquid. Pt states she ate breakfast without difficulty. Pt denies fever, chills, headache, cough and dizziness. Pt denies chest pain and sob at rest and exertion. Pt denies constipation and diarrhea at this time.     Review of Systems   Constitutional:  Negative for chills, fatigue and fever.   HENT: Negative.  Negative for congestion and rhinorrhea.    Eyes: Negative.  Negative for visual disturbance.   Respiratory:  Negative for cough and shortness of breath.    Cardiovascular:  Positive for leg swelling.   Gastrointestinal:  Positive for nausea and vomiting. Negative for constipation and diarrhea.   Endocrine: Negative.    Genitourinary: Negative.  Negative for dysuria.        Incontinent of bowel and bladder   Musculoskeletal:  Positive for gait problem and myalgias.   Allergic/Immunologic: Negative for immunocompromised state.   Neurological:  Positive for weakness. Negative for dizziness, light-headedness and headaches.   Hematological: Negative.    Psychiatric/Behavioral: Negative.  Negative for confusion and sleep disturbance. The patient is not " nervous/anxious.        Objective   There were no vitals taken for this visit.    Physical Exam  Constitutional:       General: She is not in acute distress.     Appearance: She is obese.      Comments: Wheelchair bound   HENT:      Head: Normocephalic.      Nose: Nose normal.      Mouth/Throat:      Mouth: Mucous membranes are dry.   Eyes:      Conjunctiva/sclera: Conjunctivae normal.      Pupils: Pupils are equal, round, and reactive to light.   Cardiovascular:      Rate and Rhythm: Normal rate and regular rhythm.      Pulses: Normal pulses.      Heart sounds: Normal heart sounds.   Pulmonary:      Effort: Pulmonary effort is normal. No respiratory distress.      Breath sounds: No wheezing or rhonchi.   Abdominal:      General: Bowel sounds are normal.   Musculoskeletal:      Cervical back: Normal range of motion.      Right lower leg: Edema present.      Left lower leg: Edema present.   Skin:     General: Skin is dry.      Capillary Refill: Capillary refill takes less than 2 seconds.      Findings: Rash present.             Comments: Erythema (fungal) between folds under axillary.     Neurological:      Mental Status: She is alert. Mental status is at baseline.      Motor: Weakness present.      Comments: Oriented x1   Psychiatric:         Mood and Affect: Mood normal.         Behavior: Behavior is cooperative.         Assessment/Plan   Diagnoses and all orders for this visit:  Nausea and vomiting, unspecified vomiting type  Comments:  Continue zofran as needed.  Diarrhea, unspecified type  -     loperamide (Imodium A-D) 2 mg tablet; Take 1 tablet (2 mg) by mouth 4 times a day as needed for diarrhea. Take 2 tablets (4mg) po with first loose stool, and 1 tablet (2mg) with each additional loose stool. Not to exceed 16mg/day.

## 2024-02-14 ENCOUNTER — TELEPHONE (OUTPATIENT)
Dept: PRIMARY CARE | Facility: CLINIC | Age: 84
End: 2024-02-14
Payer: MEDICARE

## 2024-02-27 NOTE — TELEPHONE ENCOUNTER
[employer] faxed to [employer] and they are processing.   Tried calling patient. Voicemail not set up, unable to leave message

## 2024-03-11 ENCOUNTER — TELEPHONE (OUTPATIENT)
Dept: PRIMARY CARE | Facility: CLINIC | Age: 84
End: 2024-03-11
Payer: MEDICARE

## 2024-03-11 NOTE — TELEPHONE ENCOUNTER
Left message with patient to call me back. I believe we can just observe at the present time. Patient has odor to wound.  Della would like to recommend change in current treatment to include: Cleanse with Normal Saline, pat dry, apply medi honey and Calcium Alginate, cover with ABD pad, and kerlix.  Change three times weekly and as needed.  Please Advise.

## 2024-04-05 PROCEDURE — G0179 MD RECERTIFICATION HHA PT: HCPCS

## 2024-04-17 DIAGNOSIS — R06.02 SHORTNESS OF BREATH: Primary | ICD-10-CM

## 2024-05-08 ENCOUNTER — NURSING HOME VISIT (OUTPATIENT)
Dept: POST ACUTE CARE | Facility: EXTERNAL LOCATION | Age: 84
End: 2024-05-08
Payer: MEDICARE

## 2024-05-08 DIAGNOSIS — R63.2 BINGE EATING: Primary | ICD-10-CM

## 2024-05-08 PROCEDURE — 99349 HOME/RES VST EST MOD MDM 40: CPT

## 2024-05-08 RX ORDER — TOPIRAMATE 25 MG/1
25 TABLET ORAL DAILY
Qty: 30 TABLET | Refills: 5 | Status: SHIPPED | OUTPATIENT
Start: 2024-05-08 | End: 2024-05-16 | Stop reason: DRUGHIGH

## 2024-05-08 NOTE — LETTER
"Patient: Amrita Lopez  : 1940    Encounter Date: 2024    Subjective  Patient ID: Amrita Lopez is a 83 y.o. female who is assisted living/ home patient being seen at [facility], and evaluated for weight gain.     HPI   Pt visited in assisted living due to weight gain. Pt with a new wheelchair in January, however is now outgrowing chair and clothes. Nursing staff reports pt has a weight of 352lb (with wheelchair). Pt was unable to stand to get weight without wheelchair. Pt a tay lift.  Labs recently obtained at facility (CMP, CBC, TSH, Hgba1c), and were WNL. Pt in the past was not interested in weight loss, however due to excessive weight gain over the last few months pt is now interested after speaking to patient about health risks due to obesity. Pt with CHF and a current smoker. Pt admits to binge eating, and having double portions. Nursing states pt \"eating junk food in apartment\". Pt is sedentary. Pt denies fever, chills, headache, and dizziness. Pt denies chest pain, and sob at rest and exertion despite audible wheezing. Pt denies voiding symptoms and constipation.     Review of Systems   Constitutional:  Negative for chills, fatigue and fever.   HENT: Negative.  Negative for congestion and rhinorrhea.    Eyes: Negative.  Negative for visual disturbance.   Respiratory:  Positive for wheezing. Negative for cough and shortness of breath.    Cardiovascular:  Negative for leg swelling.   Gastrointestinal:  Negative for constipation, diarrhea and nausea.   Endocrine: Negative.    Genitourinary: Negative.  Negative for dysuria.        Incontinent of bowel and bladder   Musculoskeletal:  Positive for arthralgias, gait problem and myalgias.        Wheelchair bound and tay lift   Skin:  Positive for wound.   Allergic/Immunologic: Negative for immunocompromised state.   Neurological:  Positive for weakness. Negative for dizziness, light-headedness and headaches.   Hematological: " Negative.    Psychiatric/Behavioral: Negative.  Negative for confusion and sleep disturbance. The patient is not nervous/anxious.        Objective  There were no vitals taken for this visit.    Physical Exam  Constitutional:       General: She is not in acute distress.     Appearance: She is obese.      Comments: Wheelchair bound   HENT:      Head: Normocephalic.      Nose: Nose normal.      Mouth/Throat:      Mouth: Mucous membranes are dry.   Eyes:      Conjunctiva/sclera: Conjunctivae normal.      Pupils: Pupils are equal, round, and reactive to light.   Cardiovascular:      Rate and Rhythm: Normal rate and regular rhythm.      Pulses: Normal pulses.      Heart sounds: Normal heart sounds.   Pulmonary:      Effort: Pulmonary effort is normal. No respiratory distress.      Breath sounds: No wheezing or rhonchi.   Abdominal:      General: Bowel sounds are normal.   Musculoskeletal:         General: Normal range of motion.      Cervical back: Normal range of motion.      Right lower leg: No edema.      Left lower leg: No edema.   Skin:     General: Skin is dry.      Capillary Refill: Capillary refill takes less than 2 seconds.      Findings: Rash present.   Neurological:      Mental Status: She is alert. Mental status is at baseline.      Motor: Weakness present.      Comments: Oriented x1   Psychiatric:         Mood and Affect: Mood normal.         Behavior: Behavior is cooperative.         Assessment/Plan  Diagnoses and all orders for this visit:  Binge eating  -     topiramate (Topamax) 25 mg tablet; Take 1 tablet (25 mg) by mouth once daily.          Electronically Signed By: RADHA Donovan-CNP   5/11/24  9:30 PM

## 2024-05-11 ASSESSMENT — ENCOUNTER SYMPTOMS
ARTHRALGIAS: 1
WOUND: 1
COUGH: 0
ENDOCRINE NEGATIVE: 1
RHINORRHEA: 0
NERVOUS/ANXIOUS: 0
FEVER: 0
MYALGIAS: 1
EYES NEGATIVE: 1
CHILLS: 0
HEMATOLOGIC/LYMPHATIC NEGATIVE: 1
DIZZINESS: 0
CONFUSION: 0
NAUSEA: 0
SLEEP DISTURBANCE: 0
HEADACHES: 0
DIARRHEA: 0
LIGHT-HEADEDNESS: 0
WHEEZING: 1
DYSURIA: 0
WEAKNESS: 1
CONSTIPATION: 0
FATIGUE: 0
PSYCHIATRIC NEGATIVE: 1
SHORTNESS OF BREATH: 0

## 2024-05-12 NOTE — PROGRESS NOTES
"Subjective   Patient ID: Amrita Lopez is a 83 y.o. female who is assisted living/ home patient being seen at [facility], and evaluated for weight gain.     HPI   Pt visited in assisted living due to weight gain. Pt with a new wheelchair in January, however is now outgrowing chair and clothes. Nursing staff reports pt has a weight of 352lb (with wheelchair). Pt was unable to stand to get weight without wheelchair. Pt a tay lift.  Labs recently obtained at facility (CMP, CBC, TSH, Hgba1c), and were WNL. Pt in the past was not interested in weight loss, however due to excessive weight gain over the last few months pt is now interested after speaking to patient about health risks due to obesity. Pt with CHF and a current smoker. Pt admits to binge eating, and having double portions. Nursing states pt \"eating junk food in apartment\". Pt is sedentary. Pt was ambulating with a walker last year per nursing. Pt denies fever, chills, headache, and dizziness. Pt denies chest pain, and sob at rest and exertion despite audible wheezing. Pt denies voiding symptoms and constipation.     Current Outpatient Medications on File Prior to Visit   Medication Sig Dispense Refill    acetaminophen (Tylenol) 325 mg tablet Take 2 tablets (650 mg) by mouth every 6 hours if needed for mild pain (1 - 3), headaches or fever (temp greater than 38.0 C).      albuterol 108 (90 Base) MCG/ACT inhaler Inhale 2 puffs every 2 hours if needed for wheezing or shortness of breath.      ammonium lactate (Lac-Hydrin) 12 % lotion Apply 1 Application topically 2 times a day.      atorvastatin (Lipitor) 10 mg tablet Take 1 tablet (10 mg) by mouth once daily.      clopidogrel (Plavix) 75 mg tablet Take 1 tablet (75 mg) by mouth once daily.      cyanocobalamin (Vitamin B-12) 1,000 mcg tablet Take 0.5 tablets (500 mcg) by mouth once daily.      furosemide (Lasix) 40 mg tablet Take 1 tablet (40 mg) by mouth once daily.      inhalational spacing " device inhaler Use as directed with inhalers 1 each 0    ipratropium-albuteroL (Duo-Neb) 0.5-2.5 mg/3 mL nebulizer solution Take 3 mL by nebulization every 6 hours.      loperamide (Imodium A-D) 2 mg tablet Take 1 tablet (2 mg) by mouth 4 times a day as needed for diarrhea. Take 2 tablets (4mg) po with first loose stool, and 1 tablet (2mg) with each additional loose stool. Not to exceed 16mg/day. 30 tablet 0    loratadine (Claritin) 10 mg tablet Take 1 tablet (10 mg) by mouth once daily.      magnesium hydroxide (Milk of Magnesia) 2,400 mg/10 mL suspension suspension Take 30 mL by mouth once daily as needed for constipation.      meloxicam (Mobic) 7.5 mg tablet Take 1 tablet (7.5 mg) by mouth once daily.      nystatin (Mycostatin) 100,000 unit/gram powder Apply 1 Application topically 2 times a day.      omeprazole OTC (PriLOSEC OTC) 20 mg EC tablet Take 1 tablet (20 mg) by mouth once daily in the morning. Take before meals. Do not crush, chew, or split.      ondansetron ODT (Zofran-ODT) 4 mg disintegrating tablet Take 1 tablet (4 mg) by mouth every 8 hours if needed for nausea or vomiting. 20 tablet 0    polyethylene glycol (Glycolax, Miralax) 17 gram packet Take 17 g by mouth once daily as needed (constipation).      sennosides (Senokot) 8.6 mg tablet Take 2 tablets (17.2 mg) by mouth 2 times a day. (Patient taking differently: Take 2 tablets (17.2 mg) by mouth 2 times a day as needed for constipation.)       No current facility-administered medications on file prior to visit.        Review of Systems   Constitutional:  Negative for chills, fatigue and fever.   HENT: Negative.  Negative for congestion and rhinorrhea.    Eyes: Negative.  Negative for visual disturbance.   Respiratory:  Positive for wheezing. Negative for cough and shortness of breath.    Cardiovascular:  Negative for leg swelling.   Gastrointestinal:  Negative for constipation, diarrhea and nausea.   Endocrine: Negative.    Genitourinary: Negative.   Negative for dysuria.        Incontinent of bowel and bladder   Musculoskeletal:  Positive for arthralgias, gait problem and myalgias.        Wheelchair bound and tay lift   Skin:  Positive for wound.   Allergic/Immunologic: Negative for immunocompromised state.   Neurological:  Positive for weakness. Negative for dizziness, light-headedness and headaches.   Hematological: Negative.    Psychiatric/Behavioral: Negative.  Negative for confusion and sleep disturbance. The patient is not nervous/anxious.        Objective   There were no vitals taken for this visit.    Physical Exam  Constitutional:       General: She is not in acute distress.     Appearance: She is obese.      Comments: Wheelchair bound   HENT:      Head: Normocephalic.      Nose: Nose normal.      Mouth/Throat:      Mouth: Mucous membranes are dry.   Eyes:      Conjunctiva/sclera: Conjunctivae normal.      Pupils: Pupils are equal, round, and reactive to light.   Cardiovascular:      Rate and Rhythm: Normal rate and regular rhythm.      Pulses: Normal pulses.      Heart sounds: Normal heart sounds.   Pulmonary:      Effort: Pulmonary effort is normal. No respiratory distress.      Breath sounds: No wheezing or rhonchi.   Abdominal:      General: Bowel sounds are normal.   Musculoskeletal:         General: Normal range of motion.      Cervical back: Normal range of motion.      Right lower leg: No edema.      Left lower leg: No edema.   Skin:     General: Skin is dry.      Capillary Refill: Capillary refill takes less than 2 seconds.      Findings: Rash present.   Neurological:      Mental Status: She is alert. Mental status is at baseline.      Motor: Weakness present.      Comments: Oriented x1   Psychiatric:         Mood and Affect: Mood normal.         Behavior: Behavior is cooperative.         Assessment/Plan   Diagnoses and all orders for this visit:  Binge eating  -     topiramate (Topamax) 25 mg tablet; Take 1 tablet (25 mg) by mouth once  daily.

## 2024-05-14 DIAGNOSIS — R23.9 ALTERATION IN SKIN INTEGRITY DUE TO MOISTURE: Primary | ICD-10-CM

## 2024-05-14 RX ORDER — HYDROPHILIC CREAM
1 PASTE (GRAM) TOPICAL DAILY
Qty: 170 G | Refills: 1 | Status: SHIPPED | OUTPATIENT
Start: 2024-05-14

## 2024-05-14 NOTE — PROGRESS NOTES
Pt with significant moisture associated skin damaged to buttocks and posterior thighs. Collaboration with wound care Stephani Cameron NP. Will start Triad Hydrophilic Wound Dressing Paste to be applied daily. Do not scrub off prior application when reapplying. Continue until healed. Spoke with Sydney RENEE at facility with new orders. Sydney to notify [facility] who comes in for dressing changes.

## 2024-05-15 ENCOUNTER — NURSING HOME VISIT (OUTPATIENT)
Dept: POST ACUTE CARE | Facility: EXTERNAL LOCATION | Age: 84
End: 2024-05-15
Payer: MEDICARE

## 2024-05-15 DIAGNOSIS — R63.2 BINGE EATING: Primary | ICD-10-CM

## 2024-05-15 DIAGNOSIS — E66.01 CLASS 3 SEVERE OBESITY DUE TO EXCESS CALORIES WITH SERIOUS COMORBIDITY AND BODY MASS INDEX (BMI) OF 50.0 TO 59.9 IN ADULT (MULTI): ICD-10-CM

## 2024-05-15 DIAGNOSIS — R23.8 SKIN BREAKDOWN: ICD-10-CM

## 2024-05-15 PROCEDURE — 99349 HOME/RES VST EST MOD MDM 40: CPT

## 2024-05-15 NOTE — LETTER
"Patient: Amrita Lopez  : 1940    Encounter Date: 05/15/2024    Subjective  Patient ID: Amrita Lopez is a 83 y.o. female who is assisted living/ home patient being seen at [facility], and evaluated for medication follow up, and skin breakdown.     HPI    Pt visited in apartment. Pt started on topamax last week to assist with weight loss by decreasing appetite. Pt laying in bed, eating breakfast. Pt states she hasn't noticed a difference in appetite and commented \"I'm eating for four people\". Pt eating breakfast in bed, and going down to dining room for lunch and dinner. Pt with \"junk food\" in apartment. Pt states \"that's not for me, that's for the girls'. Pt with extensive skin breakdown to posterior thighs and buttocks due to incontinence of bowel and bladder. Pt has outgrown 24\" wheelchair that was obtained in January. Pt without additional space for movement when in standard wheelchair. Pt is unable to self propel due to being confined, and limited free range of motion in chair. Pt requires custom wheelchair to be able to have increased independence to perform ADL's in apartment.  Pt a tay lift, and unable to stand due to bilateral lower extremity weakness and obesity. Pt denies fever, chills, headache, nausea/vomiting, and dizziness. Pt denies chest pain, and sob at rest and exertion. Pt incontinent of bowel and bladder. Collaboration with facility nursing, and wound care nurse practitioner Stephani Cameron NP.     Review of Systems   Constitutional:  Negative for chills, fatigue and fever.   HENT: Negative.  Negative for congestion and rhinorrhea.    Eyes: Negative.  Negative for visual disturbance.   Respiratory:  Positive for wheezing. Negative for cough and shortness of breath.    Cardiovascular:  Negative for leg swelling.   Gastrointestinal:  Negative for constipation, diarrhea and nausea.        Incontinent of bowel   Endocrine: Negative.    Genitourinary: Negative.  Negative " for dysuria.        Incontinent of bladder   Musculoskeletal:  Positive for arthralgias, gait problem and myalgias.        Wheelchair bound and tay lift   Skin:  Positive for wound.   Allergic/Immunologic: Negative for immunocompromised state.   Neurological:  Positive for weakness. Negative for dizziness, light-headedness and headaches.   Hematological: Negative.    Psychiatric/Behavioral: Negative.  Negative for confusion and sleep disturbance. The patient is not nervous/anxious.        Objective  Wt 138 kg (304 lb)   BMI 52.18 kg/m²     Physical Exam  Constitutional:       General: She is not in acute distress.     Appearance: She is obese.      Comments: Wheelchair bound   HENT:      Head: Normocephalic.      Nose: Nose normal.      Mouth/Throat:      Mouth: Mucous membranes are dry.   Eyes:      Conjunctiva/sclera: Conjunctivae normal.      Pupils: Pupils are equal, round, and reactive to light.   Cardiovascular:      Rate and Rhythm: Normal rate and regular rhythm.      Pulses: Normal pulses.      Heart sounds: Normal heart sounds.   Pulmonary:      Effort: Pulmonary effort is normal. No respiratory distress.      Breath sounds: No wheezing or rhonchi.   Abdominal:      General: Bowel sounds are normal.   Musculoskeletal:         General: Normal range of motion.      Cervical back: Normal range of motion.      Right lower leg: No edema.      Left lower leg: No edema.   Skin:     General: Skin is dry.      Capillary Refill: Capillary refill takes less than 2 seconds.      Findings: Wound present.             Comments: Moisture associated skin damage with bleeding.    Neurological:      Mental Status: She is alert. Mental status is at baseline.      Motor: Weakness present.      Comments: Oriented x1   Psychiatric:         Mood and Affect: Mood normal.         Behavior: Behavior is cooperative.         Assessment/Plan  Diagnoses and all orders for this visit:  Binge eating  -     topiramate (Topamax) 50 mg  tablet; Take 1 tablet (50 mg) by mouth once daily.  Skin breakdown  Comments:  Pt with MASD to buttocks and posterior thighs. Referral made to [facility] (Stephani Cameron NP). Continue triad dressing paste.  Class 3 severe obesity due to excess calories with serious comorbidity and body mass index (BMI) of 50.0 to 59.9 in adult (Multi)  Comments:  Refer to PT to evaluate for custom wheelchair due to obesity and extensive skin breakdown to posterior thighs and buttocks.  *Collaboration with Kilo/Hardeep with [employer].         Electronically Signed By: RADHA Donovan-CNP   5/17/24 10:55 PM

## 2024-05-16 RX ORDER — TOPIRAMATE 50 MG/1
50 TABLET, FILM COATED ORAL DAILY
Qty: 30 TABLET | Refills: 5 | Status: SHIPPED | OUTPATIENT
Start: 2024-05-16

## 2024-05-17 VITALS — WEIGHT: 293 LBS | BODY MASS INDEX: 52.18 KG/M2

## 2024-05-17 PROBLEM — E66.813 CLASS 3 SEVERE OBESITY DUE TO EXCESS CALORIES WITH SERIOUS COMORBIDITY AND BODY MASS INDEX (BMI) OF 50.0 TO 59.9 IN ADULT: Status: ACTIVE | Noted: 2023-11-25

## 2024-05-17 ASSESSMENT — ENCOUNTER SYMPTOMS
SHORTNESS OF BREATH: 0
RHINORRHEA: 0
ENDOCRINE NEGATIVE: 1
NAUSEA: 0
DYSURIA: 0
WOUND: 1
HEMATOLOGIC/LYMPHATIC NEGATIVE: 1
ROS GI COMMENTS: INCONTINENT OF BOWEL
NERVOUS/ANXIOUS: 0
ARTHRALGIAS: 1
SLEEP DISTURBANCE: 0
HEADACHES: 0
EYES NEGATIVE: 1
COUGH: 0
FEVER: 0
DIZZINESS: 0
CONSTIPATION: 0
WHEEZING: 1
LIGHT-HEADEDNESS: 0
WEAKNESS: 1
PSYCHIATRIC NEGATIVE: 1
DIARRHEA: 0
MYALGIAS: 1
CONFUSION: 0
FATIGUE: 0
CHILLS: 0

## 2024-05-18 NOTE — PROGRESS NOTES
"Subjective   Patient ID: Amrita Lopez is a 83 y.o. female who is assisted living/ home patient being seen at [facility], and evaluated for medication follow up, and skin breakdown.     HPI    Pt visited in apartment. Pt started on topamax last week to assist with weight loss by decreasing appetite. Pt laying in bed, eating breakfast. Pt states she hasn't noticed a difference in appetite and commented \"I'm eating for four people\". Pt eating breakfast in bed, and going down to dining room for lunch and dinner. Pt with \"junk food\" in apartment. Pt states \"that's not for me, that's for the girls'. Pt with extensive skin breakdown to posterior thighs and buttocks due to incontinence of bowel and bladder. Pt has outgrown 24\" wheelchair that was obtained in January. Pt without additional space for movement when in standard wheelchair. Pt is unable to self propel due to being confined, and limited free range of motion in chair. Pt requires custom wheelchair to be able to have increased independence to perform ADL's in apartment.  Pt a tay lift, and unable to stand due to bilateral lower extremity weakness and obesity. Pt denies fever, chills, headache, nausea/vomiting, and dizziness. Pt denies chest pain, and sob at rest and exertion. Pt incontinent of bowel and bladder. Collaboration with facility nursing, and wound care nurse practitioner Stephani Cameron NP.     Review of Systems   Constitutional:  Negative for chills, fatigue and fever.   HENT: Negative.  Negative for congestion and rhinorrhea.    Eyes: Negative.  Negative for visual disturbance.   Respiratory:  Positive for wheezing. Negative for cough and shortness of breath.    Cardiovascular:  Negative for leg swelling.   Gastrointestinal:  Negative for constipation, diarrhea and nausea.        Incontinent of bowel   Endocrine: Negative.    Genitourinary: Negative.  Negative for dysuria.        Incontinent of bladder   Musculoskeletal:  Positive " for arthralgias, gait problem and myalgias.        Wheelchair bound and tay lift   Skin:  Positive for wound.   Allergic/Immunologic: Negative for immunocompromised state.   Neurological:  Positive for weakness. Negative for dizziness, light-headedness and headaches.   Hematological: Negative.    Psychiatric/Behavioral: Negative.  Negative for confusion and sleep disturbance. The patient is not nervous/anxious.        Objective   Wt 138 kg (304 lb)   BMI 52.18 kg/m²     Physical Exam  Constitutional:       General: She is not in acute distress.     Appearance: She is obese.      Comments: Wheelchair bound   HENT:      Head: Normocephalic.      Nose: Nose normal.      Mouth/Throat:      Mouth: Mucous membranes are dry.   Eyes:      Conjunctiva/sclera: Conjunctivae normal.      Pupils: Pupils are equal, round, and reactive to light.   Cardiovascular:      Rate and Rhythm: Normal rate and regular rhythm.      Pulses: Normal pulses.      Heart sounds: Normal heart sounds.   Pulmonary:      Effort: Pulmonary effort is normal. No respiratory distress.      Breath sounds: No wheezing or rhonchi.   Abdominal:      General: Bowel sounds are normal.   Musculoskeletal:         General: Normal range of motion.      Cervical back: Normal range of motion.      Right lower leg: No edema.      Left lower leg: No edema.   Skin:     General: Skin is dry.      Capillary Refill: Capillary refill takes less than 2 seconds.      Findings: Wound present.             Comments: Moisture associated skin damage with bleeding.    Neurological:      Mental Status: She is alert. Mental status is at baseline.      Motor: Weakness present.      Comments: Oriented x1   Psychiatric:         Mood and Affect: Mood normal.         Behavior: Behavior is cooperative.         Assessment/Plan   Diagnoses and all orders for this visit:  Binge eating  -     topiramate (Topamax) 50 mg tablet; Take 1 tablet (50 mg) by mouth once daily.  Skin  breakdown  Comments:  Pt with MASD to buttocks and posterior thighs. Referral made to [facility] (Stephani Cameron NP). Continue triad dressing paste.  Class 3 severe obesity due to excess calories with serious comorbidity and body mass index (BMI) of 50.0 to 59.9 in adult (Multi)  Comments:  Refer to PT to evaluate for custom wheelchair due to obesity and extensive skin breakdown to posterior thighs and buttocks.  *Collaboration with [employer] with [employer].

## 2024-05-21 ENCOUNTER — HOSPITAL ENCOUNTER (OUTPATIENT)
Facility: HOSPITAL | Age: 84
Setting detail: OBSERVATION
Discharge: NURSING, REHAB OR LONG-TERM CARE | End: 2024-05-24
Attending: STUDENT IN AN ORGANIZED HEALTH CARE EDUCATION/TRAINING PROGRAM | Admitting: INTERNAL MEDICINE
Payer: MEDICARE

## 2024-05-21 DIAGNOSIS — R21 RASH: ICD-10-CM

## 2024-05-21 DIAGNOSIS — M79.18 BUTTOCK PAIN: ICD-10-CM

## 2024-05-21 DIAGNOSIS — K59.00 CONSTIPATION, UNSPECIFIED CONSTIPATION TYPE: ICD-10-CM

## 2024-05-21 DIAGNOSIS — S31.809A WOUND OF BUTTOCK, UNSPECIFIED LATERALITY, INITIAL ENCOUNTER: ICD-10-CM

## 2024-05-21 DIAGNOSIS — S31.000A SACRAL WOUND, INITIAL ENCOUNTER: Primary | ICD-10-CM

## 2024-05-21 DIAGNOSIS — E78.5 HYPERLIPIDEMIA, UNSPECIFIED HYPERLIPIDEMIA TYPE: ICD-10-CM

## 2024-05-21 LAB
ANION GAP SERPL CALC-SCNC: 10 MMOL/L
BASOPHILS # BLD AUTO: 0.06 X10*3/UL (ref 0–0.1)
BASOPHILS NFR BLD AUTO: 0.6 %
BUN SERPL-MCNC: 23 MG/DL (ref 8–25)
CALCIUM SERPL-MCNC: 9.6 MG/DL (ref 8.5–10.4)
CHLORIDE SERPL-SCNC: 107 MMOL/L (ref 97–107)
CO2 SERPL-SCNC: 28 MMOL/L (ref 24–31)
CREAT SERPL-MCNC: 1.1 MG/DL (ref 0.4–1.6)
EGFRCR SERPLBLD CKD-EPI 2021: 51 ML/MIN/1.73M*2
EOSINOPHIL # BLD AUTO: 0.82 X10*3/UL (ref 0–0.4)
EOSINOPHIL NFR BLD AUTO: 7.8 %
ERYTHROCYTE [DISTWIDTH] IN BLOOD BY AUTOMATED COUNT: 13.7 % (ref 11.5–14.5)
GLUCOSE SERPL-MCNC: 96 MG/DL (ref 65–99)
HCT VFR BLD AUTO: 43 % (ref 36–46)
HGB BLD-MCNC: 13.4 G/DL (ref 12–16)
IMM GRANULOCYTES # BLD AUTO: 0.03 X10*3/UL (ref 0–0.5)
IMM GRANULOCYTES NFR BLD AUTO: 0.3 % (ref 0–0.9)
LYMPHOCYTES # BLD AUTO: 2.13 X10*3/UL (ref 0.8–3)
LYMPHOCYTES NFR BLD AUTO: 20.2 %
MCH RBC QN AUTO: 29.2 PG (ref 26–34)
MCHC RBC AUTO-ENTMCNC: 31.2 G/DL (ref 32–36)
MCV RBC AUTO: 94 FL (ref 80–100)
MONOCYTES # BLD AUTO: 0.64 X10*3/UL (ref 0.05–0.8)
MONOCYTES NFR BLD AUTO: 6.1 %
NEUTROPHILS # BLD AUTO: 6.86 X10*3/UL (ref 1.6–5.5)
NEUTROPHILS NFR BLD AUTO: 65 %
NRBC BLD-RTO: 0 /100 WBCS (ref 0–0)
PLATELET # BLD AUTO: 329 X10*3/UL (ref 150–450)
POTASSIUM SERPL-SCNC: 4.2 MMOL/L (ref 3.4–5.1)
RBC # BLD AUTO: 4.59 X10*6/UL (ref 4–5.2)
SODIUM SERPL-SCNC: 145 MMOL/L (ref 133–145)
WBC # BLD AUTO: 10.5 X10*3/UL (ref 4.4–11.3)

## 2024-05-21 PROCEDURE — 96365 THER/PROPH/DIAG IV INF INIT: CPT | Mod: 59

## 2024-05-21 PROCEDURE — 2500000001 HC RX 250 WO HCPCS SELF ADMINISTERED DRUGS (ALT 637 FOR MEDICARE OP): Performed by: INTERNAL MEDICINE

## 2024-05-21 PROCEDURE — 80048 BASIC METABOLIC PNL TOTAL CA: CPT | Performed by: STUDENT IN AN ORGANIZED HEALTH CARE EDUCATION/TRAINING PROGRAM

## 2024-05-21 PROCEDURE — G0378 HOSPITAL OBSERVATION PER HR: HCPCS

## 2024-05-21 PROCEDURE — 87040 BLOOD CULTURE FOR BACTERIA: CPT | Mod: WESLAB | Performed by: STUDENT IN AN ORGANIZED HEALTH CARE EDUCATION/TRAINING PROGRAM

## 2024-05-21 PROCEDURE — 36415 COLL VENOUS BLD VENIPUNCTURE: CPT | Performed by: STUDENT IN AN ORGANIZED HEALTH CARE EDUCATION/TRAINING PROGRAM

## 2024-05-21 PROCEDURE — 85025 COMPLETE CBC W/AUTO DIFF WBC: CPT | Performed by: STUDENT IN AN ORGANIZED HEALTH CARE EDUCATION/TRAINING PROGRAM

## 2024-05-21 PROCEDURE — 99285 EMERGENCY DEPT VISIT HI MDM: CPT

## 2024-05-21 PROCEDURE — 2500000004 HC RX 250 GENERAL PHARMACY W/ HCPCS (ALT 636 FOR OP/ED): Performed by: INTERNAL MEDICINE

## 2024-05-21 PROCEDURE — 96372 THER/PROPH/DIAG INJ SC/IM: CPT | Performed by: INTERNAL MEDICINE

## 2024-05-21 RX ORDER — CLOPIDOGREL BISULFATE 75 MG/1
75 TABLET ORAL DAILY
Status: DISCONTINUED | OUTPATIENT
Start: 2024-05-21 | End: 2024-05-24 | Stop reason: HOSPADM

## 2024-05-21 RX ORDER — NAPROXEN SODIUM 220 MG/1
81 TABLET, FILM COATED ORAL DAILY
Status: DISCONTINUED | OUTPATIENT
Start: 2024-05-21 | End: 2024-05-24 | Stop reason: HOSPADM

## 2024-05-21 RX ORDER — ACETAMINOPHEN 500 MG
5000 TABLET ORAL DAILY
COMMUNITY

## 2024-05-21 RX ORDER — FUROSEMIDE 40 MG/1
40 TABLET ORAL DAILY
Status: DISCONTINUED | OUTPATIENT
Start: 2024-05-21 | End: 2024-05-24 | Stop reason: HOSPADM

## 2024-05-21 RX ORDER — NAPROXEN SODIUM 220 MG/1
81 TABLET, FILM COATED ORAL DAILY
COMMUNITY

## 2024-05-21 RX ORDER — UBIDECARENONE 75 MG
500 CAPSULE ORAL DAILY
Status: DISCONTINUED | OUTPATIENT
Start: 2024-05-21 | End: 2024-05-24 | Stop reason: HOSPADM

## 2024-05-21 RX ORDER — MELOXICAM 7.5 MG/1
7.5 TABLET ORAL DAILY
Status: DISCONTINUED | OUTPATIENT
Start: 2024-05-21 | End: 2024-05-24 | Stop reason: HOSPADM

## 2024-05-21 RX ORDER — AMMONIUM LACTATE 12 G/100G
1 LOTION TOPICAL 2 TIMES DAILY
Status: DISCONTINUED | OUTPATIENT
Start: 2024-05-21 | End: 2024-05-24 | Stop reason: HOSPADM

## 2024-05-21 RX ORDER — PANTOPRAZOLE SODIUM 40 MG/1
40 TABLET, DELAYED RELEASE ORAL
Status: DISCONTINUED | OUTPATIENT
Start: 2024-05-22 | End: 2024-05-24 | Stop reason: HOSPADM

## 2024-05-21 RX ORDER — ACETAMINOPHEN 325 MG/1
650 TABLET ORAL EVERY 4 HOURS PRN
Status: DISCONTINUED | OUTPATIENT
Start: 2024-05-21 | End: 2024-05-24 | Stop reason: HOSPADM

## 2024-05-21 RX ORDER — ATORVASTATIN CALCIUM 10 MG/1
10 TABLET, FILM COATED ORAL NIGHTLY
Status: DISCONTINUED | OUTPATIENT
Start: 2024-05-21 | End: 2024-05-24 | Stop reason: HOSPADM

## 2024-05-21 RX ORDER — GUAIFENESIN 600 MG/1
600 TABLET, EXTENDED RELEASE ORAL EVERY 12 HOURS PRN
Status: DISCONTINUED | OUTPATIENT
Start: 2024-05-21 | End: 2024-05-24 | Stop reason: HOSPADM

## 2024-05-21 RX ORDER — ACETAMINOPHEN 160 MG/5ML
650 SOLUTION ORAL EVERY 4 HOURS PRN
Status: DISCONTINUED | OUTPATIENT
Start: 2024-05-21 | End: 2024-05-24 | Stop reason: HOSPADM

## 2024-05-21 RX ORDER — MENTHOL/ZINC OXIDE 0.44 %-20%
1 OINTMENT (GRAM) TOPICAL AS NEEDED
COMMUNITY

## 2024-05-21 RX ORDER — ACETAMINOPHEN 650 MG/1
650 SUPPOSITORY RECTAL EVERY 4 HOURS PRN
Status: DISCONTINUED | OUTPATIENT
Start: 2024-05-21 | End: 2024-05-24 | Stop reason: HOSPADM

## 2024-05-21 RX ORDER — ONDANSETRON 4 MG/1
4 TABLET, ORALLY DISINTEGRATING ORAL EVERY 8 HOURS PRN
Status: DISCONTINUED | OUTPATIENT
Start: 2024-05-21 | End: 2024-05-24 | Stop reason: HOSPADM

## 2024-05-21 RX ORDER — LOPERAMIDE HYDROCHLORIDE 2 MG/1
2 CAPSULE ORAL 4 TIMES DAILY PRN
Status: DISCONTINUED | OUTPATIENT
Start: 2024-05-21 | End: 2024-05-24 | Stop reason: HOSPADM

## 2024-05-21 RX ORDER — ZINC OXIDE 20 G/100G
OINTMENT TOPICAL AS NEEDED
Status: DISCONTINUED | OUTPATIENT
Start: 2024-05-21 | End: 2024-05-24 | Stop reason: HOSPADM

## 2024-05-21 RX ORDER — LORATADINE 10 MG/1
10 TABLET ORAL DAILY
Status: DISCONTINUED | OUTPATIENT
Start: 2024-05-21 | End: 2024-05-24 | Stop reason: HOSPADM

## 2024-05-21 RX ORDER — ONDANSETRON 4 MG/1
4 TABLET, FILM COATED ORAL EVERY 8 HOURS PRN
Status: DISCONTINUED | OUTPATIENT
Start: 2024-05-21 | End: 2024-05-24 | Stop reason: HOSPADM

## 2024-05-21 RX ORDER — TALC
6 POWDER (GRAM) TOPICAL NIGHTLY PRN
Status: DISCONTINUED | OUTPATIENT
Start: 2024-05-21 | End: 2024-05-24 | Stop reason: HOSPADM

## 2024-05-21 RX ORDER — IPRATROPIUM BROMIDE AND ALBUTEROL SULFATE 2.5; .5 MG/3ML; MG/3ML
3 SOLUTION RESPIRATORY (INHALATION)
Status: DISCONTINUED | OUTPATIENT
Start: 2024-05-21 | End: 2024-05-22

## 2024-05-21 RX ORDER — GUAIFENESIN/DEXTROMETHORPHAN 100-10MG/5
5 SYRUP ORAL EVERY 4 HOURS PRN
Status: DISCONTINUED | OUTPATIENT
Start: 2024-05-21 | End: 2024-05-24 | Stop reason: HOSPADM

## 2024-05-21 RX ORDER — TOPIRAMATE 50 MG/1
50 TABLET, FILM COATED ORAL DAILY
Status: DISCONTINUED | OUTPATIENT
Start: 2024-05-21 | End: 2024-05-24 | Stop reason: HOSPADM

## 2024-05-21 RX ORDER — ACETAMINOPHEN 325 MG/1
650 TABLET ORAL EVERY 6 HOURS PRN
Status: DISCONTINUED | OUTPATIENT
Start: 2024-05-21 | End: 2024-05-21

## 2024-05-21 RX ORDER — SENNOSIDES 8.6 MG/1
2 TABLET ORAL 2 TIMES DAILY
Status: DISCONTINUED | OUTPATIENT
Start: 2024-05-21 | End: 2024-05-24 | Stop reason: HOSPADM

## 2024-05-21 RX ORDER — HEPARIN SODIUM 5000 [USP'U]/ML
7500 INJECTION, SOLUTION INTRAVENOUS; SUBCUTANEOUS EVERY 8 HOURS SCHEDULED
Status: DISCONTINUED | OUTPATIENT
Start: 2024-05-21 | End: 2024-05-24 | Stop reason: HOSPADM

## 2024-05-21 RX ORDER — ALBUTEROL SULFATE 0.83 MG/ML
3 SOLUTION RESPIRATORY (INHALATION) EVERY 2 HOUR PRN
Status: DISCONTINUED | OUTPATIENT
Start: 2024-05-21 | End: 2024-05-24 | Stop reason: HOSPADM

## 2024-05-21 RX ORDER — NYSTATIN 100000 [USP'U]/G
1 POWDER TOPICAL 2 TIMES DAILY
Status: DISCONTINUED | OUTPATIENT
Start: 2024-05-21 | End: 2024-05-24 | Stop reason: HOSPADM

## 2024-05-21 RX ORDER — MAGNESIUM HYDROXIDE 2400 MG/10ML
30 SUSPENSION ORAL DAILY PRN
Status: DISCONTINUED | OUTPATIENT
Start: 2024-05-21 | End: 2024-05-24 | Stop reason: HOSPADM

## 2024-05-21 RX ORDER — POLYETHYLENE GLYCOL 3350 17 G/17G
17 POWDER, FOR SOLUTION ORAL DAILY PRN
Status: DISCONTINUED | OUTPATIENT
Start: 2024-05-21 | End: 2024-05-22 | Stop reason: SDUPTHER

## 2024-05-21 RX ORDER — ONDANSETRON HYDROCHLORIDE 2 MG/ML
4 INJECTION, SOLUTION INTRAVENOUS EVERY 8 HOURS PRN
Status: DISCONTINUED | OUTPATIENT
Start: 2024-05-21 | End: 2024-05-24 | Stop reason: HOSPADM

## 2024-05-21 RX ADMIN — NYSTATIN 1 APPLICATION: 100000 POWDER TOPICAL at 21:29

## 2024-05-21 RX ADMIN — TOPIRAMATE 50 MG: 50 TABLET, FILM COATED ORAL at 21:59

## 2024-05-21 RX ADMIN — MELOXICAM 7.5 MG: 7.5 TABLET ORAL at 21:59

## 2024-05-21 RX ADMIN — SENNOSIDES 17.2 MG: 8.6 TABLET, FILM COATED ORAL at 21:29

## 2024-05-21 RX ADMIN — AMMONIUM LACTATE 1 APPLICATION: 120 LOTION TOPICAL at 22:05

## 2024-05-21 RX ADMIN — CLOPIDOGREL BISULFATE 75 MG: 75 TABLET ORAL at 21:59

## 2024-05-21 RX ADMIN — ATORVASTATIN CALCIUM 10 MG: 10 TABLET, FILM COATED ORAL at 21:29

## 2024-05-21 RX ADMIN — Medication 5000 UNITS: at 22:15

## 2024-05-21 RX ADMIN — FUROSEMIDE 40 MG: 40 TABLET ORAL at 21:59

## 2024-05-21 RX ADMIN — LORATADINE 10 MG: 10 TABLET ORAL at 21:59

## 2024-05-21 RX ADMIN — CYANOCOBALAMIN TAB 500 MCG 500 MCG: 500 TAB at 21:59

## 2024-05-21 RX ADMIN — ASPIRIN 81 MG: 81 TABLET, CHEWABLE ORAL at 21:59

## 2024-05-21 RX ADMIN — HEPARIN SODIUM 7500 UNITS: 5000 INJECTION, SOLUTION INTRAVENOUS; SUBCUTANEOUS at 21:30

## 2024-05-21 SDOH — SOCIAL STABILITY: SOCIAL INSECURITY: ARE YOU OR HAVE YOU BEEN THREATENED OR ABUSED PHYSICALLY, EMOTIONALLY, OR SEXUALLY BY ANYONE?: NO

## 2024-05-21 SDOH — SOCIAL STABILITY: SOCIAL INSECURITY: ARE THERE ANY APPARENT SIGNS OF INJURIES/BEHAVIORS THAT COULD BE RELATED TO ABUSE/NEGLECT?: NO

## 2024-05-21 SDOH — SOCIAL STABILITY: SOCIAL INSECURITY: DOES ANYONE TRY TO KEEP YOU FROM HAVING/CONTACTING OTHER FRIENDS OR DOING THINGS OUTSIDE YOUR HOME?: NO

## 2024-05-21 SDOH — SOCIAL STABILITY: SOCIAL INSECURITY: DO YOU FEEL ANYONE HAS EXPLOITED OR TAKEN ADVANTAGE OF YOU FINANCIALLY OR OF YOUR PERSONAL PROPERTY?: NO

## 2024-05-21 SDOH — SOCIAL STABILITY: SOCIAL INSECURITY: ABUSE: ADULT

## 2024-05-21 SDOH — SOCIAL STABILITY: SOCIAL INSECURITY: HAVE YOU HAD THOUGHTS OF HARMING ANYONE ELSE?: NO

## 2024-05-21 SDOH — SOCIAL STABILITY: SOCIAL INSECURITY: DO YOU FEEL UNSAFE GOING BACK TO THE PLACE WHERE YOU ARE LIVING?: NO

## 2024-05-21 SDOH — SOCIAL STABILITY: SOCIAL INSECURITY: HAS ANYONE EVER THREATENED TO HURT YOUR FAMILY OR YOUR PETS?: NO

## 2024-05-21 ASSESSMENT — PATIENT HEALTH QUESTIONNAIRE - PHQ9
SUM OF ALL RESPONSES TO PHQ9 QUESTIONS 1 & 2: 0
1. LITTLE INTEREST OR PLEASURE IN DOING THINGS: NOT AT ALL
2. FEELING DOWN, DEPRESSED OR HOPELESS: NOT AT ALL

## 2024-05-21 ASSESSMENT — ACTIVITIES OF DAILY LIVING (ADL)
HEARING - LEFT EAR: FUNCTIONAL
GROOMING: NEEDS ASSISTANCE
ADEQUATE_TO_COMPLETE_ADL: YES
ASSISTIVE_DEVICE: WALKER
BATHING: NEEDS ASSISTANCE
TOILETING: NEEDS ASSISTANCE
DRESSING YOURSELF: NEEDS ASSISTANCE
PATIENT'S MEMORY ADEQUATE TO SAFELY COMPLETE DAILY ACTIVITIES?: YES
WALKS IN HOME: NEEDS ASSISTANCE
FEEDING YOURSELF: INDEPENDENT
HEARING - RIGHT EAR: FUNCTIONAL
JUDGMENT_ADEQUATE_SAFELY_COMPLETE_DAILY_ACTIVITIES: YES
LACK_OF_TRANSPORTATION: NO

## 2024-05-21 ASSESSMENT — LIFESTYLE VARIABLES
AUDIT-C TOTAL SCORE: 2
TOTAL SCORE: 0
HOW OFTEN DO YOU HAVE 6 OR MORE DRINKS ON ONE OCCASION: NEVER
HAVE YOU EVER FELT YOU SHOULD CUT DOWN ON YOUR DRINKING: NO
HOW MANY STANDARD DRINKS CONTAINING ALCOHOL DO YOU HAVE ON A TYPICAL DAY: 1 OR 2
HOW OFTEN DO YOU HAVE A DRINK CONTAINING ALCOHOL: 2-4 TIMES A MONTH
AUDIT-C TOTAL SCORE: 2
EVER FELT BAD OR GUILTY ABOUT YOUR DRINKING: NO
EVER HAD A DRINK FIRST THING IN THE MORNING TO STEADY YOUR NERVES TO GET RID OF A HANGOVER: NO
SKIP TO QUESTIONS 9-10: 1
HAVE PEOPLE ANNOYED YOU BY CRITICIZING YOUR DRINKING: NO

## 2024-05-21 ASSESSMENT — COGNITIVE AND FUNCTIONAL STATUS - GENERAL
DAILY ACTIVITIY SCORE: 19
DRESSING REGULAR LOWER BODY CLOTHING: A LOT
MOBILITY SCORE: 11
MOVING FROM LYING ON BACK TO SITTING ON SIDE OF FLAT BED WITH BEDRAILS: A LOT
PATIENT BASELINE BEDBOUND: NO
TURNING FROM BACK TO SIDE WHILE IN FLAT BAD: A LOT
DRESSING REGULAR UPPER BODY CLOTHING: A LITTLE
MOVING TO AND FROM BED TO CHAIR: A LOT
TOILETING: A LITTLE
STANDING UP FROM CHAIR USING ARMS: A LOT
CLIMB 3 TO 5 STEPS WITH RAILING: TOTAL
WALKING IN HOSPITAL ROOM: A LOT
HELP NEEDED FOR BATHING: A LITTLE

## 2024-05-21 ASSESSMENT — PAIN - FUNCTIONAL ASSESSMENT
PAIN_FUNCTIONAL_ASSESSMENT: 0-10
PAIN_FUNCTIONAL_ASSESSMENT: 0-10

## 2024-05-21 ASSESSMENT — PAIN SCALES - GENERAL
PAINLEVEL_OUTOF10: 0 - NO PAIN
PAINLEVEL_OUTOF10: 7
PAINLEVEL_OUTOF10: 0 - NO PAIN

## 2024-05-21 ASSESSMENT — PAIN DESCRIPTION - LOCATION: LOCATION: KNEE

## 2024-05-21 ASSESSMENT — PAIN DESCRIPTION - ORIENTATION: ORIENTATION: RIGHT;LEFT

## 2024-05-21 NOTE — ED PROVIDER NOTES
HPI   Chief Complaint   Patient presents with    Wound Check     Per EMS Patient sent in from [facility] due to pressure ulcers they can no longer provide care for.        Patient presents with sacral and thigh wounds.  She is not able to get out of bed secondary to stroke.  She is currently at rehab facility but they are not able to provide adequate care for her given her profound immobility.  She was sent to the emergency department for likely need for skilled nursing placement.  Patient states that her legs and buttocks have been hurting a lot.                          Leiter Coma Scale Score: 15                     Patient History   Past Medical History:   Diagnosis Date    Chronic bronchiolitis (Multi)     Hyperlipidemia     Nicotine dependence     Stroke (Multi)     Vitamin B12 deficiency      Past Surgical History:   Procedure Laterality Date    ADENOIDECTOMY      MR HEAD ANGIO WO IV CONTRAST  11/03/2020    MR HEAD ANGIO WO IV CONTRAST [facility]    MR HEAD ANGIO WO IV CONTRAST  03/29/2021    MR HEAD ANGIO WO IV CONTRAST [facility]    MR NECK ANGIO WO IV CONTRAST  03/30/2021    MR NECK ANGIO WO IV CONTRAST [facility]    TONSILLECTOMY       Family History   Problem Relation Name Age of Onset    Other (CP) Daughter      Alcohol abuse Son       Social History     Tobacco Use    Smoking status: Every Day     Current packs/day: 0.50     Types: Cigarettes    Smokeless tobacco: Never   Substance Use Topics    Alcohol use: Defer    Drug use: Never       Physical Exam   ED Triage Vitals [05/21/24 1147]   Temperature Heart Rate Respirations BP   36.4 °C (97.5 °F) 63 18 126/82      Pulse Ox Temp Source Heart Rate Source Patient Position   97 % Oral Monitor Lying      BP Location FiO2 (%)     Left arm --       Physical Exam  HENT:      Head: Normocephalic.   Eyes:      General: No scleral icterus.  Skin:     Comments: Pressure wounds noted on inner thighs bilaterally, as well as  buttocks to the sacral area.  No pus drainage noted.   Neurological:      Mental Status: She is alert.      Comments: Patient awake, alert, able to speak normally.         ED Course & MDM   Diagnoses as of 05/21/24 1302   Sacral wound, initial encounter       Medical Decision Making  Patient presents from her rehab facility with worsening sacral wounds.  They are unable to adequately care for her at her facility.  Wounds do not appear to be grossly infected at this time, however I suspect they could become so if they are not dealt with promptly.  Patient accepted to primary care physician's service for further management.  Parts of this chart were completed with dictation software, please excuse any errors in transcription.        Procedure  Procedures     Chuy Hall MD  05/21/24 5086

## 2024-05-21 NOTE — PROGRESS NOTES
Pharmacy Medication History Review    Amrita Lopez is a 83 y.o. female admitted for Sacral wound, initial encounter. Pharmacy reviewed the patient's [id number] medications and allergies for accuracy.    Medications ADDED:  Aspirin 81mg chewable  Mentol-zinc oxide ointment  Vitamin D3 5000Unit  Medications CHANGED:  Ondansetron 4mg ODT - every 6 hours  Sennosides 8.6mg  Medications REMOVED:   none     The list below reflects the updated PTA list. Comments regarding how patient may be taking medications differently can be found in the Admit Orders Activity  Prior to Admission Medications   Prescriptions Last Dose Informant   acetaminophen (Tylenol) 325 mg tablet  Other   Sig: Take 2 tablets (650 mg) by mouth every 6 hours if needed for mild pain (1 - 3), headaches or fever (temp greater than 38.0 C).   albuterol 108 (90 Base) MCG/ACT inhaler  Other   Sig: Inhale 2 puffs every 2 hours if needed for wheezing or shortness of breath.   ammonium lactate (Lac-Hydrin) 12 % lotion  Other   Sig: Apply 1 Application topically 2 times a day.   aspirin 81 mg EC tablet  Other   Sig: Chew 1 tablet (81 mg) once daily.   atorvastatin (Lipitor) 10 mg tablet  Other   Sig: Take 1 tablet (10 mg) by mouth once daily.   cholecalciferol (Vitamin D-3) 5,000 Units tablet  Other   Sig: Take 1 tablet (5,000 Units) by mouth once daily.   clopidogrel (Plavix) 75 mg tablet  Other   Sig: Take 1 tablet (75 mg) by mouth once daily.   cyanocobalamin (Vitamin B-12) 1,000 mcg tablet  Other   Sig: Take 0.5 tablets (500 mcg) by mouth once daily.   furosemide (Lasix) 40 mg tablet  Other   Sig: Take 1 tablet (40 mg) by mouth once daily.   inhalational spacing device inhaler  Other   Sig: Use as directed with inhalers   ipratropium-albuteroL (Duo-Neb) 0.5-2.5 mg/3 mL nebulizer solution  Other   Sig: Take 3 mL by nebulization every 6 hours.   loperamide (Imodium A-D) 2 mg tablet  Other   Sig: Take 1 tablet (2 mg) by mouth 4 times a day as needed  "for diarrhea. Take 2 tablets (4mg) po with first loose stool, and 1 tablet (2mg) with each additional loose stool. Not to exceed 16mg/day.   loratadine (Claritin) 10 mg tablet  Other   Sig: Take 1 tablet (10 mg) by mouth once daily.   magnesium hydroxide (Milk of Magnesia) 2,400 mg/10 mL suspension suspension  Other   Sig: Take 30 mL by mouth once daily as needed for constipation.   meloxicam (Mobic) 7.5 mg tablet  Other   Sig: Take 1 tablet (7.5 mg) by mouth once daily.   menthol-zinc oxide (Zinc-Oxyde Plus) 0.44-20 % ointment  Other   Sig: Apply 1 Application topically if needed. After incontinence   nystatin (Mycostatin) 100,000 unit/gram powder  Other   Sig: Apply 1 Application topically 2 times a day.   omeprazole OTC (PriLOSEC OTC) 20 mg EC tablet  Other   Sig: Take 1 tablet (20 mg) by mouth once daily in the morning. Take before meals. Do not crush, chew, or split.   ondansetron ODT (Zofran-ODT) 4 mg disintegrating tablet  Other   Sig: Take 1 tablet (4 mg) by mouth every 8 hours if needed for nausea or vomiting.   Patient taking differently: Take 1 tablet (4 mg) by mouth every 6 hours if needed for nausea or vomiting.   polyethylene glycol (Glycolax, Miralax) 17 gram packet  Other   Sig: Take 17 g by mouth once daily as needed (constipation).   sennosides (Senokot) 8.6 mg tablet  Other   Sig: Take 2 tablets (17.2 mg) by mouth 2 times a day.   Patient taking differently: Take 2 tablets (17.2 mg) by mouth 2 times a day as needed for constipation.   topiramate (Topamax) 50 mg tablet  Other   Sig: Take 1 tablet (50 mg) by mouth once daily.   wound dressings (Triad Wound Dressing) paste  Other   Sig: Apply 1 Application topically once daily. Apply to affected areas (buttocks, posterior thighs) once daily, do not scrub off prior application when reapplying. Apply 1/16\" thick.      Facility-Administered Medications: None        The list below reflects the updated allergy list. Please review each documented allergy " for additional clarification and justification.  Allergies  Reviewed by Sierra Michael on 5/21/2024        Severity Reactions Comments    Nickel Medium Hives             Pharmacy has been updated to none.    Sources used to complete the med history include facility medication list.    Below are additional concerns with the patient's PTA list.  none    Sierra Michael  Please reach out via [employer] Secure Chat for questions

## 2024-05-22 LAB
ALBUMIN SERPL-MCNC: 3.4 G/DL (ref 3.5–5)
ALP BLD-CCNC: 95 U/L (ref 35–125)
ALT SERPL-CCNC: 6 U/L (ref 5–40)
AMORPH CRY #/AREA UR COMP ASSIST: ABNORMAL /HPF
ANION GAP SERPL CALC-SCNC: 12 MMOL/L
APPEARANCE UR: ABNORMAL
AST SERPL-CCNC: 8 U/L (ref 5–40)
BACTERIA #/AREA URNS AUTO: ABNORMAL /HPF
BILIRUB SERPL-MCNC: 0.6 MG/DL (ref 0.1–1.2)
BILIRUB UR STRIP.AUTO-MCNC: NEGATIVE MG/DL
BUN SERPL-MCNC: 21 MG/DL (ref 8–25)
CALCIUM SERPL-MCNC: 9.1 MG/DL (ref 8.5–10.4)
CHLORIDE SERPL-SCNC: 105 MMOL/L (ref 97–107)
CO2 SERPL-SCNC: 24 MMOL/L (ref 24–31)
COLOR UR: YELLOW
CREAT SERPL-MCNC: 0.8 MG/DL (ref 0.4–1.6)
EGFRCR SERPLBLD CKD-EPI 2021: 74 ML/MIN/1.73M*2
ERYTHROCYTE [DISTWIDTH] IN BLOOD BY AUTOMATED COUNT: 13.6 % (ref 11.5–14.5)
GLUCOSE SERPL-MCNC: 105 MG/DL (ref 65–99)
GLUCOSE UR STRIP.AUTO-MCNC: NORMAL MG/DL
HCT VFR BLD AUTO: 42.1 % (ref 36–46)
HGB BLD-MCNC: 13.4 G/DL (ref 12–16)
HYALINE CASTS #/AREA URNS AUTO: ABNORMAL /LPF
KETONES UR STRIP.AUTO-MCNC: ABNORMAL MG/DL
LEUKOCYTE ESTERASE UR QL STRIP.AUTO: ABNORMAL
MCH RBC QN AUTO: 29.1 PG (ref 26–34)
MCHC RBC AUTO-ENTMCNC: 31.8 G/DL (ref 32–36)
MCV RBC AUTO: 92 FL (ref 80–100)
MUCOUS THREADS #/AREA URNS AUTO: ABNORMAL /LPF
NITRITE UR QL STRIP.AUTO: ABNORMAL
NRBC BLD-RTO: 0 /100 WBCS (ref 0–0)
PH UR STRIP.AUTO: 6 [PH]
PLATELET # BLD AUTO: 276 X10*3/UL (ref 150–450)
POTASSIUM SERPL-SCNC: 3.5 MMOL/L (ref 3.4–5.1)
PROT SERPL-MCNC: 6.1 G/DL (ref 5.9–7.9)
PROT UR STRIP.AUTO-MCNC: ABNORMAL MG/DL
RBC # BLD AUTO: 4.6 X10*6/UL (ref 4–5.2)
RBC # UR STRIP.AUTO: ABNORMAL /UL
RBC #/AREA URNS AUTO: >20 /HPF
SODIUM SERPL-SCNC: 141 MMOL/L (ref 133–145)
SP GR UR STRIP.AUTO: 1.02
SQUAMOUS #/AREA URNS AUTO: ABNORMAL /HPF
UROBILINOGEN UR STRIP.AUTO-MCNC: NORMAL MG/DL
WBC # BLD AUTO: 9.2 X10*3/UL (ref 4.4–11.3)
WBC #/AREA URNS AUTO: >50 /HPF
WBC CLUMPS #/AREA URNS AUTO: ABNORMAL /HPF

## 2024-05-22 PROCEDURE — 2500000001 HC RX 250 WO HCPCS SELF ADMINISTERED DRUGS (ALT 637 FOR MEDICARE OP): Performed by: INTERNAL MEDICINE

## 2024-05-22 PROCEDURE — 81003 URINALYSIS AUTO W/O SCOPE: CPT | Performed by: INTERNAL MEDICINE

## 2024-05-22 PROCEDURE — 97530 THERAPEUTIC ACTIVITIES: CPT | Mod: GO

## 2024-05-22 PROCEDURE — 85027 COMPLETE CBC AUTOMATED: CPT | Performed by: INTERNAL MEDICINE

## 2024-05-22 PROCEDURE — 94664 DEMO&/EVAL PT USE INHALER: CPT

## 2024-05-22 PROCEDURE — 2500000004 HC RX 250 GENERAL PHARMACY W/ HCPCS (ALT 636 FOR OP/ED): Performed by: INTERNAL MEDICINE

## 2024-05-22 PROCEDURE — 9420000001 HC RT PATIENT EDUCATION 5 MIN

## 2024-05-22 PROCEDURE — 97162 PT EVAL MOD COMPLEX 30 MIN: CPT | Mod: GP

## 2024-05-22 PROCEDURE — 96372 THER/PROPH/DIAG INJ SC/IM: CPT | Performed by: INTERNAL MEDICINE

## 2024-05-22 PROCEDURE — 97166 OT EVAL MOD COMPLEX 45 MIN: CPT | Mod: GO

## 2024-05-22 PROCEDURE — 94760 N-INVAS EAR/PLS OXIMETRY 1: CPT

## 2024-05-22 PROCEDURE — 96367 TX/PROPH/DG ADDL SEQ IV INF: CPT

## 2024-05-22 PROCEDURE — 36415 COLL VENOUS BLD VENIPUNCTURE: CPT | Performed by: INTERNAL MEDICINE

## 2024-05-22 PROCEDURE — 97530 THERAPEUTIC ACTIVITIES: CPT | Mod: GP

## 2024-05-22 PROCEDURE — 94640 AIRWAY INHALATION TREATMENT: CPT

## 2024-05-22 PROCEDURE — 2500000004 HC RX 250 GENERAL PHARMACY W/ HCPCS (ALT 636 FOR OP/ED): Mod: JZ | Performed by: INTERNAL MEDICINE

## 2024-05-22 PROCEDURE — G0378 HOSPITAL OBSERVATION PER HR: HCPCS

## 2024-05-22 PROCEDURE — 80053 COMPREHEN METABOLIC PANEL: CPT | Performed by: INTERNAL MEDICINE

## 2024-05-22 PROCEDURE — 2500000004 HC RX 250 GENERAL PHARMACY W/ HCPCS (ALT 636 FOR OP/ED): Performed by: NURSE PRACTITIONER

## 2024-05-22 PROCEDURE — 2500000002 HC RX 250 W HCPCS SELF ADMINISTERED DRUGS (ALT 637 FOR MEDICARE OP, ALT 636 FOR OP/ED): Performed by: INTERNAL MEDICINE

## 2024-05-22 RX ORDER — POLYETHYLENE GLYCOL 3350 17 G/17G
17 POWDER, FOR SOLUTION ORAL DAILY PRN
Status: DISCONTINUED | OUTPATIENT
Start: 2024-05-22 | End: 2024-05-24 | Stop reason: HOSPADM

## 2024-05-22 RX ORDER — VANCOMYCIN 1.5 G/300ML
1500 INJECTION, SOLUTION INTRAVENOUS ONCE
Status: COMPLETED | OUTPATIENT
Start: 2024-05-22 | End: 2024-05-22

## 2024-05-22 RX ORDER — CEFTRIAXONE 1 G/50ML
1 INJECTION, SOLUTION INTRAVENOUS EVERY 24 HOURS
Status: DISCONTINUED | OUTPATIENT
Start: 2024-05-22 | End: 2024-05-22

## 2024-05-22 RX ORDER — IPRATROPIUM BROMIDE AND ALBUTEROL SULFATE 2.5; .5 MG/3ML; MG/3ML
3 SOLUTION RESPIRATORY (INHALATION)
Status: DISCONTINUED | OUTPATIENT
Start: 2024-05-22 | End: 2024-05-24 | Stop reason: HOSPADM

## 2024-05-22 RX ADMIN — SENNOSIDES 17.2 MG: 8.6 TABLET, FILM COATED ORAL at 21:25

## 2024-05-22 RX ADMIN — IPRATROPIUM BROMIDE AND ALBUTEROL SULFATE 3 ML: 2.5; .5 SOLUTION RESPIRATORY (INHALATION) at 19:02

## 2024-05-22 RX ADMIN — IPRATROPIUM BROMIDE AND ALBUTEROL SULFATE 3 ML: 2.5; .5 SOLUTION RESPIRATORY (INHALATION) at 00:44

## 2024-05-22 RX ADMIN — CYANOCOBALAMIN TAB 500 MCG 500 MCG: 500 TAB at 08:42

## 2024-05-22 RX ADMIN — IPRATROPIUM BROMIDE AND ALBUTEROL SULFATE 3 ML: 2.5; .5 SOLUTION RESPIRATORY (INHALATION) at 12:21

## 2024-05-22 RX ADMIN — HEPARIN SODIUM 7500 UNITS: 5000 INJECTION, SOLUTION INTRAVENOUS; SUBCUTANEOUS at 06:05

## 2024-05-22 RX ADMIN — NYSTATIN 1 APPLICATION: 100000 POWDER TOPICAL at 21:28

## 2024-05-22 RX ADMIN — IPRATROPIUM BROMIDE AND ALBUTEROL SULFATE 3 ML: 2.5; .5 SOLUTION RESPIRATORY (INHALATION) at 07:52

## 2024-05-22 RX ADMIN — NYSTATIN 1 APPLICATION: 100000 POWDER TOPICAL at 08:43

## 2024-05-22 RX ADMIN — ASPIRIN 81 MG: 81 TABLET, CHEWABLE ORAL at 08:41

## 2024-05-22 RX ADMIN — HEPARIN SODIUM 7500 UNITS: 5000 INJECTION, SOLUTION INTRAVENOUS; SUBCUTANEOUS at 21:28

## 2024-05-22 RX ADMIN — CLOPIDOGREL BISULFATE 75 MG: 75 TABLET ORAL at 08:42

## 2024-05-22 RX ADMIN — AMMONIUM LACTATE 1 APPLICATION: 120 LOTION TOPICAL at 21:28

## 2024-05-22 RX ADMIN — ATORVASTATIN CALCIUM 10 MG: 10 TABLET, FILM COATED ORAL at 21:25

## 2024-05-22 RX ADMIN — LORATADINE 10 MG: 10 TABLET ORAL at 08:42

## 2024-05-22 RX ADMIN — FUROSEMIDE 40 MG: 40 TABLET ORAL at 08:42

## 2024-05-22 RX ADMIN — VANCOMYCIN 1.5 G: 1.5 INJECTION, SOLUTION INTRAVENOUS at 14:13

## 2024-05-22 RX ADMIN — Medication 5000 UNITS: at 08:42

## 2024-05-22 RX ADMIN — TOPIRAMATE 50 MG: 50 TABLET, FILM COATED ORAL at 08:45

## 2024-05-22 RX ADMIN — PANTOPRAZOLE SODIUM 40 MG: 40 TABLET, DELAYED RELEASE ORAL at 06:05

## 2024-05-22 RX ADMIN — MELOXICAM 7.5 MG: 7.5 TABLET ORAL at 08:42

## 2024-05-22 RX ADMIN — HEPARIN SODIUM 7500 UNITS: 5000 INJECTION, SOLUTION INTRAVENOUS; SUBCUTANEOUS at 13:24

## 2024-05-22 RX ADMIN — CEFTRIAXONE SODIUM 1 G: 1 INJECTION, SOLUTION INTRAVENOUS at 08:56

## 2024-05-22 SDOH — ECONOMIC STABILITY: FOOD INSECURITY: WITHIN THE PAST 12 MONTHS, YOU WORRIED THAT YOUR FOOD WOULD RUN OUT BEFORE YOU GOT MONEY TO BUY MORE.: NEVER TRUE

## 2024-05-22 SDOH — ECONOMIC STABILITY: INCOME INSECURITY: IN THE PAST 12 MONTHS, HAS THE ELECTRIC, GAS, OIL, OR WATER COMPANY THREATENED TO SHUT OFF SERVICE IN YOUR HOME?: NO

## 2024-05-22 SDOH — ECONOMIC STABILITY: INCOME INSECURITY: IN THE LAST 12 MONTHS, WAS THERE A TIME WHEN YOU WERE NOT ABLE TO PAY THE MORTGAGE OR RENT ON TIME?: NO

## 2024-05-22 SDOH — ECONOMIC STABILITY: TRANSPORTATION INSECURITY
IN THE PAST 12 MONTHS, HAS THE LACK OF TRANSPORTATION KEPT YOU FROM MEDICAL APPOINTMENTS OR FROM GETTING MEDICATIONS?: NO

## 2024-05-22 SDOH — SOCIAL STABILITY: SOCIAL INSECURITY
WITHIN THE LAST YEAR, HAVE TO BEEN RAPED OR FORCED TO HAVE ANY KIND OF SEXUAL ACTIVITY BY YOUR PARTNER OR EX-PARTNER?: NO

## 2024-05-22 SDOH — ECONOMIC STABILITY: HOUSING INSECURITY: IN THE LAST 12 MONTHS, HOW MANY PLACES HAVE YOU LIVED?: 1

## 2024-05-22 SDOH — SOCIAL STABILITY: SOCIAL INSECURITY: WITHIN THE LAST YEAR, HAVE YOU BEEN AFRAID OF YOUR PARTNER OR EX-PARTNER?: NO

## 2024-05-22 SDOH — SOCIAL STABILITY: SOCIAL INSECURITY
WITHIN THE LAST YEAR, HAVE YOU BEEN KICKED, HIT, SLAPPED, OR OTHERWISE PHYSICALLY HURT BY YOUR PARTNER OR EX-PARTNER?: NO

## 2024-05-22 SDOH — SOCIAL STABILITY: SOCIAL INSECURITY: WITHIN THE LAST YEAR, HAVE YOU BEEN HUMILIATED OR EMOTIONALLY ABUSED IN OTHER WAYS BY YOUR PARTNER OR EX-PARTNER?: NO

## 2024-05-22 SDOH — ECONOMIC STABILITY: HOUSING INSECURITY
IN THE LAST 12 MONTHS, WAS THERE A TIME WHEN YOU DID NOT HAVE A STEADY PLACE TO SLEEP OR SLEPT IN A SHELTER (INCLUDING NOW)?: NO

## 2024-05-22 SDOH — ECONOMIC STABILITY: FOOD INSECURITY: WITHIN THE PAST 12 MONTHS, THE FOOD YOU BOUGHT JUST DIDN'T LAST AND YOU DIDN'T HAVE MONEY TO GET MORE.: NEVER TRUE

## 2024-05-22 SDOH — ECONOMIC STABILITY: TRANSPORTATION INSECURITY
IN THE PAST 12 MONTHS, HAS LACK OF TRANSPORTATION KEPT YOU FROM MEETINGS, WORK, OR FROM GETTING THINGS NEEDED FOR DAILY LIVING?: NO

## 2024-05-22 SDOH — ECONOMIC STABILITY: INCOME INSECURITY: HOW HARD IS IT FOR YOU TO PAY FOR THE VERY BASICS LIKE FOOD, HOUSING, MEDICAL CARE, AND HEATING?: NOT HARD AT ALL

## 2024-05-22 ASSESSMENT — COGNITIVE AND FUNCTIONAL STATUS - GENERAL
PERSONAL GROOMING: A LOT
STANDING UP FROM CHAIR USING ARMS: A LOT
PERSONAL GROOMING: A LOT
HELP NEEDED FOR BATHING: A LOT
DRESSING REGULAR UPPER BODY CLOTHING: A LOT
DRESSING REGULAR LOWER BODY CLOTHING: TOTAL
TOILETING: A LOT
TURNING FROM BACK TO SIDE WHILE IN FLAT BAD: A LOT
MOVING TO AND FROM BED TO CHAIR: A LOT
WALKING IN HOSPITAL ROOM: TOTAL
MOVING FROM LYING ON BACK TO SITTING ON SIDE OF FLAT BED WITH BEDRAILS: A LOT
MOVING FROM LYING ON BACK TO SITTING ON SIDE OF FLAT BED WITH BEDRAILS: A LOT
CLIMB 3 TO 5 STEPS WITH RAILING: TOTAL
EATING MEALS: A LITTLE
MOBILITY SCORE: 9
WALKING IN HOSPITAL ROOM: TOTAL
DAILY ACTIVITIY SCORE: 11
STANDING UP FROM CHAIR USING ARMS: A LOT
STANDING UP FROM CHAIR USING ARMS: A LOT
HELP NEEDED FOR BATHING: A LOT
MOBILITY SCORE: 10
TOILETING: TOTAL
DRESSING REGULAR LOWER BODY CLOTHING: TOTAL
MOBILITY SCORE: 10
MOVING FROM LYING ON BACK TO SITTING ON SIDE OF FLAT BED WITH BEDRAILS: A LOT
CLIMB 3 TO 5 STEPS WITH RAILING: TOTAL
DAILY ACTIVITIY SCORE: 11
MOVING TO AND FROM BED TO CHAIR: TOTAL
WALKING IN HOSPITAL ROOM: TOTAL
EATING MEALS: A LITTLE
DAILY ACTIVITIY SCORE: 14
PERSONAL GROOMING: A LITTLE
DRESSING REGULAR UPPER BODY CLOTHING: A LOT
CLIMB 3 TO 5 STEPS WITH RAILING: TOTAL
DRESSING REGULAR UPPER BODY CLOTHING: A LOT
DRESSING REGULAR LOWER BODY CLOTHING: A LOT
TOILETING: TOTAL
MOVING TO AND FROM BED TO CHAIR: A LOT
TURNING FROM BACK TO SIDE WHILE IN FLAT BAD: A LOT
TURNING FROM BACK TO SIDE WHILE IN FLAT BAD: A LOT
EATING MEALS: A LITTLE
HELP NEEDED FOR BATHING: A LOT

## 2024-05-22 ASSESSMENT — ACTIVITIES OF DAILY LIVING (ADL)
BATHING_ASSISTANCE: MAXIMAL
ADL_ASSISTANCE: NEEDS ASSISTANCE
ADL_ASSISTANCE: NEEDS ASSISTANCE

## 2024-05-22 ASSESSMENT — PAIN SCALES - WONG BAKER: WONGBAKER_NUMERICALRESPONSE: HURTS LITTLE MORE

## 2024-05-22 ASSESSMENT — PAIN SCALES - GENERAL
PAINLEVEL_OUTOF10: 7
PAINLEVEL_OUTOF10: 0 - NO PAIN
PAINLEVEL_OUTOF10: 8

## 2024-05-22 NOTE — NURSING NOTE
No change from previous assessment. Pt resting comfortably in bed with call light in reach. Wounds still present to B/L buttocks. Purewick in place to keep site dry.

## 2024-05-22 NOTE — H&P
History Of Present Illness  Amrita Lopez is a 83 y.o. female presenting with complaint of sacral and thigh wounds.  Patient is a resident of assisted living.  Patient will be his ulcers.  Assisted living facility was not able to take care of this patient with these wounds.  With this complaint she was sent to the emergency room.  In the emergency room initial workup was done and patient has been admitted to the floor for further management.  Patient does not have adequate mobility.  Patient morbidly obese aspiration.  At the time of examination patient lying in the bed, comfortable, did not look in acute distress.  Patient denied any headache or dizziness.  No nausea or vomiting.  No diarrhea, dysuria, hematuria or frequency..     Past Medical History  Past Medical History:   Diagnosis Date    Chronic bronchiolitis (Multi)     Hyperlipidemia     Nicotine dependence     Stroke (Multi)     Vitamin B12 deficiency        Surgical History  Past Surgical History:   Procedure Laterality Date    ADENOIDECTOMY      MR HEAD ANGIO WO IV CONTRAST  11/03/2020    MR HEAD ANGIO WO IV CONTR[facility]    MR HEAD ANGIO WO IV CONTRAST  03/29/2021    MR HEAD ANGIO WO IV CONTRAST [facility]    MR NECK ANGIO WO IV CONTRAST  03/30/2021    MR NECK ANGIO WO IV CONTRAST [facility]    TONSILLECTOMY          Social History  She reports that she has been smoking cigarettes. She has never used smokeless tobacco. Alcohol use questions deferred to the physician. She reports that she does not use drugs.    Family History  Family History   Problem Relation Name Age of Onset    Other (CP) Daughter      Alcohol abuse Son          Allergies  Nickel    Review of Systems  I reviewed all systems reviewed as above otherwise is negative.  Physical Exam  General examination: Patient morbidly obese, lying in the bed, comfortable, did not look in acute distress.  Patient obese, HEENT:  Head externally atraumatic, no pallor,  no icterus, extraocular movements intact, pupils reactive to light, oral mucosa moist and throat clear.  Neck:  Supple, no JVP, no palpable adenopathy or thyromegaly.  No carotid bruit.  Chest:  Clear to auscultation and resonant.  Heart:  Regular rate and rhythm, no murmur or gallop could be appreciated.  Abdomen:  Soft, obese, nontender, bowel sounds present, normoactive, no palpable hepatosplenomegaly.  Extremities:  No edema, pulses present, no cyanosis or clubbing.  CNS:  Patient alert, oriented to time, place and person.  Power 5/5 all over and deep tendon reflexes symmetrical, cranial nerves 2-12 grossly intact.  Skin: The patient has stage II decubitus ulcer present on the medial thighs and buttocks.  Musculoskeletal:  No joint swelling or erythema, range of movement normal.  Last Recorded Vitals  Heart Rate:  [66-71]   Temp:  [36.4 °C (97.5 °F)-36.6 °C (97.9 °F)]   Resp:  [18]   BP: ()/(49-83)   SpO2:  [94 %-100 %]       Relevant Results        Results for orders placed or performed during the hospital encounter of 05/21/24 (from the past 24 hour(s))   CBC and Auto Differential   Result Value Ref Range    WBC 10.5 4.4 - 11.3 x10*3/uL    nRBC 0.0 0.0 - 0.0 /100 WBCs    RBC 4.59 4.00 - 5.20 x10*6/uL    Hemoglobin 13.4 12.0 - 16.0 g/dL    Hematocrit 43.0 36.0 - 46.0 %    MCV 94 80 - 100 fL    MCH 29.2 26.0 - 34.0 pg    MCHC 31.2 (L) 32.0 - 36.0 g/dL    RDW 13.7 11.5 - 14.5 %    Platelets 329 150 - 450 x10*3/uL    Neutrophils % 65.0 40.0 - 80.0 %    Immature Granulocytes %, Automated 0.3 0.0 - 0.9 %    Lymphocytes % 20.2 13.0 - 44.0 %    Monocytes % 6.1 2.0 - 10.0 %    Eosinophils % 7.8 0.0 - 6.0 %    Basophils % 0.6 0.0 - 2.0 %    Neutrophils Absolute 6.86 (H) 1.60 - 5.50 x10*3/uL    Immature Granulocytes Absolute, Automated 0.03 0.00 - 0.50 x10*3/uL    Lymphocytes Absolute 2.13 0.80 - 3.00 x10*3/uL    Monocytes Absolute 0.64 0.05 - 0.80 x10*3/uL    Eosinophils Absolute 0.82 (H) 0.00 - 0.40 x10*3/uL     Basophils Absolute 0.06 0.00 - 0.10 x10*3/uL   Basic Metabolic Panel   Result Value Ref Range    Glucose 96 65 - 99 mg/dL    Sodium 145 133 - 145 mmol/L    Potassium 4.2 3.4 - 5.1 mmol/L    Chloride 107 97 - 107 mmol/L    Bicarbonate 28 24 - 31 mmol/L    Urea Nitrogen 23 8 - 25 mg/dL    Creatinine 1.10 0.40 - 1.60 mg/dL    eGFR 51 (L) >60 mL/min/1.73m*2    Calcium 9.6 8.5 - 10.4 mg/dL    Anion Gap 10 <=19 mmol/L   Blood Culture    Specimen: Peripheral Venipuncture; Blood culture   Result Value Ref Range    Blood Culture Loaded on Instrument - Culture in progress    Blood Culture    Specimen: Peripheral Venipuncture; Blood culture   Result Value Ref Range    Blood Culture Loaded on Instrument - Culture in progress    Urinalysis with Reflex Microscopic   Result Value Ref Range    Color, Urine Yellow Light-Yellow, Yellow, Dark-Yellow    Appearance, Urine Turbid (N) Clear    Specific Gravity, Urine 1.018 1.005 - 1.035    pH, Urine 6.0 5.0, 5.5, 6.0, 6.5, 7.0, 7.5, 8.0    Protein, Urine 100 (2+) (A) NEGATIVE, 10 (TRACE), 20 (TRACE) mg/dL    Glucose, Urine Normal Normal mg/dL    Blood, Urine 1.0 (3+) (A) NEGATIVE    Ketones, Urine TRACE (A) NEGATIVE mg/dL    Bilirubin, Urine NEGATIVE NEGATIVE    Urobilinogen, Urine Normal Normal mg/dL    Nitrite, Urine 2+ (A) NEGATIVE    Leukocyte Esterase, Urine 500 Estrella/µL (A) NEGATIVE   Microscopic Only, Urine   Result Value Ref Range    WBC, Urine >50 (A) 1-5, NONE /HPF    WBC Clumps, Urine OCCASIONAL Reference range not established. /HPF    RBC, Urine >20 (A) NONE, 1-2, 3-5 /HPF    Squamous Epithelial Cells, Urine 10-25 (FEW) Reference range not established. /HPF    Bacteria, Urine 4+ (A) NONE SEEN /HPF    Mucus, Urine FEW Reference range not established. /LPF    Hyaline Casts, Urine 3+ (A) NONE /LPF    Amorphous Crystals, Urine 1+ NONE, 1+, 2+ /HPF   Comprehensive metabolic panel   Result Value Ref Range    Glucose 105 (H) 65 - 99 mg/dL    Sodium 141 133 - 145 mmol/L    Potassium 3.5  3.4 - 5.1 mmol/L    Chloride 105 97 - 107 mmol/L    Bicarbonate 24 24 - 31 mmol/L    Urea Nitrogen 21 8 - 25 mg/dL    Creatinine 0.80 0.40 - 1.60 mg/dL    eGFR 74 >60 mL/min/1.73m*2    Calcium 9.1 8.5 - 10.4 mg/dL    Albumin 3.4 (L) 3.5 - 5.0 g/dL    Alkaline Phosphatase 95 35 - 125 U/L    Total Protein 6.1 5.9 - 7.9 g/dL    AST 8 5 - 40 U/L    Bilirubin, Total 0.6 0.1 - 1.2 mg/dL    ALT 6 5 - 40 U/L    Anion Gap 12 <=19 mmol/L   CBC   Result Value Ref Range    WBC 9.2 4.4 - 11.3 x10*3/uL    nRBC 0.0 0.0 - 0.0 /100 WBCs    RBC 4.60 4.00 - 5.20 x10*6/uL    Hemoglobin 13.4 12.0 - 16.0 g/dL    Hematocrit 42.1 36.0 - 46.0 %    MCV 92 80 - 100 fL    MCH 29.1 26.0 - 34.0 pg    MCHC 31.8 (L) 32.0 - 36.0 g/dL    RDW 13.6 11.5 - 14.5 %    Platelets 276 150 - 450 x10*3/uL     Prior to Admission medications    Medication Sig Start Date End Date Taking? Authorizing Provider   acetaminophen (Tylenol) 325 mg tablet Take 2 tablets (650 mg) by mouth every 6 hours if needed for mild pain (1 - 3), headaches or fever (temp greater than 38.0 C).    Historical Provider, MD   albuterol 108 (90 Base) MCG/ACT inhaler Inhale 2 puffs every 2 hours if needed for wheezing or shortness of breath. 3/21/23   Historical Provider, MD   ammonium lactate (Lac-Hydrin) 12 % lotion Apply 1 Application topically 2 times a day. 5/4/23   Historical Provider, MD   aspirin 81 mg EC tablet Chew 1 tablet (81 mg) once daily.    Historical Provider, MD   atorvastatin (Lipitor) 10 mg tablet Take 1 tablet (10 mg) by mouth once daily.    Historical Provider, MD   cholecalciferol (Vitamin D-3) 5,000 Units tablet Take 1 tablet (5,000 Units) by mouth once daily.    Historical Provider, MD   clopidogrel (Plavix) 75 mg tablet Take 1 tablet (75 mg) by mouth once daily.    Historical Provider, MD   cyanocobalamin (Vitamin B-12) 1,000 mcg tablet Take 0.5 tablets (500 mcg) by mouth once daily.    Historical Provider, MD   furosemide (Lasix) 40 mg tablet Take 1 tablet (40  mg) by mouth once daily. 3/8/23   Historical Provider, MD   inhalational spacing device inhaler Use as directed with inhalers 4/17/24 4/17/25  DARLENE Donovan   ipratropium-albuteroL (Duo-Neb) 0.5-2.5 mg/3 mL nebulizer solution Take 3 mL by nebulization every 6 hours. 10/28/23   Arnoldo Sanchez MD   loperamide (Imodium A-D) 2 mg tablet Take 1 tablet (2 mg) by mouth 4 times a day as needed for diarrhea. Take 2 tablets (4mg) po with first loose stool, and 1 tablet (2mg) with each additional loose stool. Not to exceed 16mg/day. 2/10/24   DARLENE Donovan   loratadine (Claritin) 10 mg tablet Take 1 tablet (10 mg) by mouth once daily.    Historical Provider, MD   magnesium hydroxide (Milk of Magnesia) 2,400 mg/10 mL suspension suspension Take 30 mL by mouth once daily as needed for constipation.    Historical Provider, MD   meloxicam (Mobic) 7.5 mg tablet Take 1 tablet (7.5 mg) by mouth once daily. 9/19/23   Historical Provider, MD   menthol-zinc oxide (Zinc-Oxyde Plus) 0.44-20 % ointment Apply 1 Application topically if needed. After incontinence    Historical Provider, MD   nystatin (Mycostatin) 100,000 unit/gram powder Apply 1 Application topically 2 times a day. 7/26/23   Historical Provider, MD   omeprazole OTC (PriLOSEC OTC) 20 mg EC tablet Take 1 tablet (20 mg) by mouth once daily in the morning. Take before meals. Do not crush, chew, or split.    Historical Provider, MD   ondansetron ODT (Zofran-ODT) 4 mg disintegrating tablet Take 1 tablet (4 mg) by mouth every 8 hours if needed for nausea or vomiting.  Patient taking differently: Take 1 tablet (4 mg) by mouth every 6 hours if needed for nausea or vomiting. 10/28/23   Arnoldo Sanchez MD   polyethylene glycol (Glycolax, Miralax) 17 gram packet Take 17 g by mouth once daily as needed (constipation). 10/28/23   Arnoldo Sanchez MD   sennosides (Senokot) 8.6 mg tablet Take 2 tablets (17.2 mg) by mouth 2 times a day.  Patient  "taking differently: Take 2 tablets (17.2 mg) by mouth 2 times a day as needed for constipation. 10/28/23   Arnoldo Sanchez MD   topiramate (Topamax) 50 mg tablet Take 1 tablet (50 mg) by mouth once daily. 5/16/24   DARLENE Donovan   wound dressings (Triad Wound Dressing) paste Apply 1 Application topically once daily. Apply to affected areas (buttocks, posterior thighs) once daily, do not scrub off prior application when reapplying. Apply 1/16\" thick. 5/14/24   DARLENE Donovan   topiramate (Topamax) 25 mg tablet Take 1 tablet (25 mg) by mouth once daily. 5/8/24 5/16/24  DARLENE Donovan       Current Facility-Administered Medications:     acetaminophen (Tylenol) tablet 650 mg, 650 mg, oral, q4h PRN **OR** acetaminophen (Tylenol) oral liquid 650 mg, 650 mg, oral, q4h PRN **OR** acetaminophen (Tylenol) suppository 650 mg, 650 mg, rectal, q4h PRN, Arnoldo Sanchez MD    albuterol 2.5 mg /3 mL (0.083 %) nebulizer solution 3 mL, 3 mL, nebulization, q2h PRN, Arnoldo Sanchez MD    ammonium lactate (Lac-Hydrin) 12 % lotion 1 Application, 1 Application, Topical, BID, Arnoldo Sanchez MD, 1 Application at 05/21/24 2205    aspirin chewable tablet 81 mg, 81 mg, oral, Daily, Arnoldo Sanchez MD, 81 mg at 05/22/24 0841    atorvastatin (Lipitor) tablet 10 mg, 10 mg, oral, Nightly, Arnoldo Sanchez MD, 10 mg at 05/21/24 2129    benzocaine-menthol (Cepastat Sore Throat) lozenge 1 lozenge, 1 lozenge, Mouth/Throat, q2h PRN, Arnoldo Sanchez MD    cefTRIAXone (Rocephin) IVPB 1 g, 1 g, intravenous, q24h, DARLENE Elizalde, Stopped at 05/22/24 0926    cholecalciferol (Vitamin D-3) tablet 5,000 Units, 5,000 Units, oral, Daily, Arnoldo Sanchez MD, 5,000 Units at 05/22/24 0842    clopidogrel (Plavix) tablet 75 mg, 75 mg, oral, Daily, Arnoldo Sanchez MD, 75 mg at 05/22/24 0842    cyanocobalamin (Vitamin B-12) tablet 500 mcg, 500 mcg, oral, Daily, Arnoldo FUNK" MD Daniel, 500 mcg at 05/22/24 0842    dextromethorphan-guaifenesin (Robitussin DM)  mg/5 mL oral liquid 5 mL, 5 mL, oral, q4h PRN, Arnoldo Sanchez MD    furosemide (Lasix) tablet 40 mg, 40 mg, oral, Daily, Arnoldo Sanchez MD, 40 mg at 05/22/24 0842    guaiFENesin (Mucinex) 12 hr tablet 600 mg, 600 mg, oral, q12h PRN, Arnoldo Sanchez MD    heparin (porcine) injection 7,500 Units, 7,500 Units, subcutaneous, q8h ANNIE, Arnoldo Sanchez MD, 7,500 Units at 05/22/24 0605    ipratropium-albuteroL (Duo-Neb) 0.5-2.5 mg/3 mL nebulizer solution 3 mL, 3 mL, nebulization, TID, Arnoldo Sanchez MD, 3 mL at 05/22/24 0752    loperamide (Imodium) capsule 2 mg, 2 mg, oral, 4x daily PRN, Arnoldo Sanchez MD    loratadine (Claritin) tablet 10 mg, 10 mg, oral, Daily, Arnoldo Sanchez MD, 10 mg at 05/22/24 0842    magnesium hydroxide (Milk of Magnesia) 2,400 mg/10 mL suspension 30 mL, 30 mL, oral, Daily PRN, Arnoldo Sanchez MD    melatonin tablet 6 mg, 6 mg, oral, Nightly PRN, Arnoldo Sanchez MD    meloxicam (Mobic) tablet 7.5 mg, 7.5 mg, oral, Daily, Arnoldo Sanchez MD, 7.5 mg at 05/22/24 0842    nystatin (Mycostatin) 100,000 unit/gram powder 1 Application, 1 Application, Topical, BID, Arnoldo Sanchez MD, 1 Application at 05/22/24 0843    ondansetron (Zofran) tablet 4 mg, 4 mg, oral, q8h PRN **OR** ondansetron (Zofran) injection 4 mg, 4 mg, intravenous, q8h PRN, Arnoldo Sanchez MD    ondansetron ODT (Zofran-ODT) disintegrating tablet 4 mg, 4 mg, oral, q8h PRN, Arnoldo Sanchez MD    pantoprazole (ProtoNix) EC tablet 40 mg, 40 mg, oral, Daily before breakfast, Arnoldo Sanchez MD, 40 mg at 05/22/24 0605    polyethylene glycol (Glycolax, Miralax) packet 17 g, 17 g, oral, Daily PRN, Arnoldo Sanchez MD    sennosides (Senokot) tablet 17.2 mg, 2 tablet, oral, BID, Arnoldo Sanchez MD, 17.2 mg at 05/21/24 2129    topiramate (Topamax) tablet 50 mg, 50 mg, oral, Daily, Arnoldo FUNK  MD Daniel, 50 mg at 05/22/24 0845    wound dressings paste 1 Application, 1 Application, topical (top), Daily, Arnoldo Sanchez MD    zinc oxide 20 % ointment, , Topical, PRN, Arnoldo Sanchez MD  No results found.  No results found for the last 90 days.       Assessment/Plan   Principal Problem:    Sacral wound, initial encounter  Morbid obesity  Chronic bronchiolitis  Hyperlipidemia  Nicotine dependence  History of CVA  B12 deficiency    Plan: Continue current medication reported to be local wound care.  Consult ID.  Give physical therapy and Occupational Therapy.  Offload the pressure areas.  We will take DVT, fall, aspiration, decubitus and DVT precaution.  Possible discharge soon       .      Arnoldo Sanchez MD

## 2024-05-22 NOTE — PROGRESS NOTES
Physical Therapy    Physical Therapy Evaluation & Treatment    Patient Name: Amrita Lopez  MRN: [id number]  Today's Date: 5/22/2024   Time Calculation  Start Time: 0907  Stop Time: 0947  Time Calculation (min): 40 min    Assessment/Plan   PT Assessment  PT Assessment Results: Decreased strength, Decreased range of motion, Decreased endurance, Impaired balance, Decreased mobility, Decreased coordination, Decreased cognition, Obesity, Pain  Rehab Prognosis: Fair  Evaluation/Treatment Tolerance: Patient limited by pain  Medical Staff Made Aware: Yes  Strengths: Support of Caregivers  Barriers to Participation: Comorbidities, Ability to acquire knowledge  End of Session Communication: Bedside nurse  Assessment Comment: pt demonstrates decline in functional mobility compared to baseline level. pt presents with decreased strength throughout B UEs and B LEs; pt is limited due to cognition and significant pain over sacral region; pt is at falls risk and would benefit from continued skilled physical therapy during hospital stay and upon discharge to address the above functional deficits.  End of Session Patient Position: Bed, 3 rail up, Alarm off, caregiver present (call bell in reach, all needs met. RN and RNAs present in room at end of session)   IP OR SWING BED PT PLAN  Inpatient or Swing Bed: Inpatient  PT Plan  Treatment/Interventions: Bed mobility, Transfer training, Gait training, Balance training, Neuromuscular re-education, Strengthening, Endurance training, Range of motion, Therapeutic exercise, Therapeutic activity, Home exercise program, Positioning, Postural re-education  PT Plan: Skilled PT  PT Frequency: 4 times per week  PT Discharge Recommendations: Moderate intensity level of continued care  Equipment Recommended upon Discharge: Wheeled walker  PT Recommended Transfer Status: Assist x2  PT - OK to Discharge: Yes (to next appropriate level of skilled care)      Subjective     General Visit  "Information:  General  Reason for Referral: impaired mobility (pt is an 82 y/o female admitted to [facility] on 5/21 with sacral wounds)  Referred By: Arnoldo Sanchez MD  Past Medical History Relevant to Rehab: chronic bronchitis, hyperlipidemia, nicotine dependence, CVA  Family/Caregiver Present: No  Prior to Session Communication: Bedside nurse  Patient Position Received: Bed, 2 rail up, Alarm off, not on at start of session  Preferred Learning Style: verbal, visual  General Comment: pt cleared for therapy via RN, agreeable to participate, received supine in bed  Home Living:  Home Living  Type of Home: Assisted living (\"[facility]\")  Lives With: Alone  Home Adaptive Equipment: Walker rolling or standard, Wheelchair-manual (3WW)  Home Access: Elevator  Prior Level of Function:  Prior Function Per Pt/Caregiver Report  Level of [place]: Needs assistance with ADLs, Needs assistance with homemaking, Needs assistance with functional transfers  Receives Help From: Primary caregiver (son PRN)  ADL Assistance: Needs assistance  Homemaking Assistance: Needs assistance  Ambulatory Assistance: Needs assistance (\"ambulates with walker\")  Prior Function Comments: pt is a questionable historian; reports she ambulates with walker and uses wheelchair; receive assistance for all ADLs and IADLs; son lives nearby and provided assist PRN  Precautions:  Precautions  Medical Precautions: Fall precautions         Objective   Pain:  Pain Assessment  Pain Assessment: 0-10  Pain Score: 7  Pain Type: Chronic pain  Pain Location: Knee  Pain Orientation: Right, Left  Cognition:  Cognition  Overall Cognitive Status: Impaired  Orientation Level: Disoriented to time, Disoriented to situation    General Assessments:  General Observation  General Observation: pt is questionable historian; increased time and effort required for all mobility; limited with mobility secondary to significant pain over sacral wounds       Activity " Tolerance  Endurance: Decreased tolerance for upright activites  Rate of Perceived Exertion (RPE): 7/10    Sensation  Sensation Comment: B LEs present with dry, flaky skin; denies paresthesias    Strength  Strength Comments: weakness noted throughout B UEs and B LEs (L > R)  Strength  Strength Comments: weakness noted throughout B UEs and B LEs (L > R)           Coordination  Movements are Fluid and Coordinated: No  Coordination Comment: decreased rate and accuracy of movment throghout B LEs; unable to step with R LE    Postural Control  Postural Control: Impaired  Posture Comment: fwd head; rounded shoulders    Static Sitting Balance  Static Sitting-Balance Support: Bilateral upper extremity supported, Feet supported  Static Sitting-Level of Assistance: Maximum assistance  Static Sitting-Comment/Number of Minutes: max A x 2  > min A x 1 sitting on EOB ~5-7 min    Static Standing Balance  Static Standing-Balance Support: Bilateral upper extremity supported (on RW)  Static Standing-Level of Assistance: Maximum assistance  Static Standing-Comment/Number of Minutes: max A x 1 to maintain static standing with use of RW for ~5-7 min  Functional Assessments:       Bed Mobility  Bed Mobility: Yes  Bed Mobility 1  Bed Mobility 1: Supine to sitting  Level of Assistance 1: Maximum assistance, +2  Bed Mobility Comments 1: supine > sit with max A x 2-3 for trunk up and B LEs off bed; pt attempts to assist with moving B LEs off bed  Bed Mobility 2  Bed Mobility  2: Sitting to supine  Level of Assistance 2: Dependent  Bed Mobility Comments 2: sit > supine with max A x 2-3 for trunk down and B LEs onto bed; dependent boost to HOB    Transfers  Transfer: Yes  Transfer 1  Transfer From 1: Sit to, Stand to  Transfer to 1: Stand, Sit  Technique 1: Sit to stand, Stand to sit  Transfer Device 1: Walker  Transfer Level of Assistance 1: Maximum assistance, +2  Trials/Comments 1: sit <> stand with max A x 2 for fwd trunk and lift; assist  with controlled eccentric lowering to EOB; pt stood at EOB ~5-7 min with max A x 1 for stability and safety as hygiene was performed and bedding changed; pt stood with fwd flexed trunk; VCs for safe hand placement    Ambulation/Gait Training  Ambulation/Gait Training Performed: No (pt attempted side steps by EOB with max A x 2 with limited success; pt able to advance L LE; unable to move R LE despite assist with weight shift)    Stairs  Stairs: No  Extremity/Trunk Assessments:  RLE   RLE : Exceptions to WFL (strength grossly 2+/5 throughout B LEs; limited AROM)  LLE   LLE : Exceptions to WFL (strength grossly 2+/5 throughout B LEs; limited AROM)  Treatments:       Therapeutic Activity  Therapeutic Activity Performed: Yes (refer to bed mob, transfers)         Bed Mobility  Bed Mobility: Yes  Bed Mobility 1  Bed Mobility 1: Supine to sitting  Level of Assistance 1: Maximum assistance, +2  Bed Mobility Comments 1: supine > sit with max A x 2-3 for trunk up and B LEs off bed; pt attempts to assist with moving B LEs off bed  Bed Mobility 2  Bed Mobility  2: Sitting to supine  Level of Assistance 2: Dependent  Bed Mobility Comments 2: sit > supine with max A x 2-3 for trunk down and B LEs onto bed; dependent boost to [facility]    Ambulation/Gait Training  Ambulation/Gait Training Performed: No (pt attempted side steps by EOB with max A x 2 with limited success; pt able to advance L LE; unable to move R LE despite assist with weight shift)  Transfers  Transfer: Yes  Transfer 1  Transfer From 1: Sit to, Stand to  Transfer to 1: Stand, Sit  Technique 1: Sit to stand, Stand to sit  Transfer Device 1: Walker  Transfer Level of Assistance 1: Maximum assistance, +2  Trials/Comments 1: sit <> stand with max A x 2 for fwd trunk and lift; assist with controlled eccentric lowering to EOB; pt stood at EOB ~5-7 min with max A x 1 for stability and safety as hygiene was performed and bedding changed; pt stood with fwd flexed trunk; VCs for  safe hand placement    Stairs  Stairs: No       Outcome Measures:  [facility] Basic Mobility  Turning from your back to your side while in a flat bed without using bedrails: A lot  Moving from lying on your back to sitting on the side of a flat bed without using bedrails: A lot  Moving to and from bed to chair (including a wheelchair): Total  Standing up from a chair using your arms (e.g. wheelchair or bedside chair): A lot  To walk in hospital room: Total  Climbing 3-5 steps with railing: Total  Basic Mobility - Total Score: 9    Encounter Problems       Encounter Problems (Active)       Balance       STG - pt will maintain upright static standing balance with upper extremity support on RW and CGA       Start:  05/22/24       INTERVENTIONS:  1. Practice standing with minimal support.  2. Educate patient about standing tolerance.  3. Educate patient about independence with gait, transfers, and ADL's.  4. Educate patient about use of assistive device.  5. Educate patient about self-directed care.            Mobility       STG - Patient will ambulate x10 steps with use of RW and mod assist        Start:  05/22/24               PT Transfers       STG - Patient will demonstrate ability to roll R/L with modified independence and use of bed rails to offload sacral wounds with min assist        Start:  05/22/24            STG - Patient will transfer sit <> stand with min assist       Start:  05/22/24               Safety       LTG - Patient will demonstrate safety requirements appropriate to situation/environment       Start:  05/22/24                   Education Documentation  Body Mechanics, taught by Robert Gibbons PT at 5/22/2024 10:50 AM.  Learner: Patient  Readiness: Acceptance  Method: Explanation, Demonstration  Response: Needs Reinforcement    Mobility Training, taught by Robert Gibbons PT at 5/22/2024 10:50 AM.  Learner: Patient  Readiness: Acceptance  Method: Explanation, Demonstration  Response: Needs  Reinforcement    Education Comments  No comments found.

## 2024-05-22 NOTE — PROGRESS NOTES
Occupational Therapy    Evaluation/Treatment    Patient Name: Amrita Lopez  MRN: [id number]  : 1940  Today's Date: 24  Time Calculation  Start Time: 1324  Stop Time: 1345  Time Calculation (min): 21 min       Assessment:  OT Assessment: Pt presents on eval with increased overall weakness, very large body habitus, increased buttock pain from sacral wound, poor activity tolerance, cognitive deficits noted, and poor sitting balance affecting her self-care and functional transfers/mobility. Pt will benefit from continued skilled OT to address these deficits.  Prognosis: Fair  Evaluation/Treatment Tolerance: Patient limited by pain, Patient limited by fatigue  End of Session Communication: Bedside nurse  End of Session Patient Position: Bed, 3 rail up, Alarm on (Needs in reach.)  OT Assessment Results: Decreased ADL status, Decreased upper extremity range of motion, Decreased upper extremity strength, Decreased safe judgment during ADL, Decreased cognition, Decreased endurance, Decreased sensation, Decreased fine motor control, Decreased functional mobility, Decreased gross motor control, Decreased trunk control for functional activities  Barriers to Participation: Insight into problems    Plan:  Treatment Interventions: ADL retraining, Functional transfer training, UE strengthening/ROM, Endurance training, Cognitive reorientation, Patient/family training, Equipment evaluation/education, Neuromuscular reeducation, Compensatory technique education, Fine motor coordination activities  OT Frequency: 3 times per week  OT Discharge Recommendations: Moderate intensity level of continued care  OT - OK to Discharge: Yes      Subjective     General:   OT Received On: 24  General  Reason for Referral: weakness, impaired ADL's/mobility  Referred By: Arnoldo Sanchez MD  Past Medical History Relevant to Rehab: chronic bronchitis, hyperlipidemia, nicotine dependence, CVA  Family/Caregiver Present:  Yes  Caregiver Feedback: PCA assisted this Therapist  Prior to Session Communication: Bedside nurse  Patient Position Received: Bed, 3 rail up, Alarm on  General Comment: Pt is an 82 yo female admitted to the hospital with a Sacral Wound from a SNF.    Precautions:  Hearing/Visual Limitations: [place]  Medical Precautions: Fall precautions    Pain:  Pain Assessment  Pain Assessment: FLACC (Face, Legs, Activity, Cry, Consolability)  Pain Score: 8  Pain Type: Acute pain  Pain Location: Buttocks  Pain Interventions: Repositioned    Objective     Cognition:  Overall Cognitive Status: Impaired  Orientation Level: Disoriented to place  Following Commands: Follows one step commands with increased time  Cognition Comments: Pt presents with intermittent confusion and delayed overall processing.  Insight: Moderate  Processing Speed: Delayed    Home Living:  Type of Home: Assisted living (vs SNF?)  Lives With: Other (Comment) (care staff)  Home Adaptive Equipment: Walker rolling or standard, Wheelchair-manual (3WW)  Home Living Comments: Pt is a questionable historian.    Prior Function:  Level of [place]: Needs assistance with ADLs, Needs assistance with homemaking, Needs assistance with functional transfers  Receives Help From: Other (Comment) (care staff or son)  ADL Assistance: Needs assistance  Homemaking Assistance: Needs assistance  Ambulatory Assistance: Needs assistance  Hand Dominance: Right  Prior Function Comments: Pt is a questionable historian.    ADL:  Eating Assistance: Minimal  Grooming Assistance: Maximal  Bathing Assistance: Maximal  UE Dressing Assistance: Maximal  LE Dressing Assistance: Total  Toileting Assistance with Device: Total  Toileting Deficit:  (purewick in place)    Activity Tolerance:  Activity Tolerance Comments: Poor    Bed Mobility/Transfers: Bed Mobility  Bed Mobility:  (Pt completed supine<>sit in bed with max A x2-3 due to very large body habitus, increased BLE weakness, and BLE  wounds.)    Transfers  Transfer: No    Functional Mobility:  Functional Mobility  Functional Mobility Performed: No    Sitting Balance:  Static Sitting Balance  Static Sitting-Balance Support: Bilateral upper extremity supported, Feet unsupported  Static Sitting-Level of Assistance: Moderate assistance, Minimum assistance  Static Sitting-Comment/Number of Minutes: several minutes sitting EOB with posterior support due to retropulsion    Therapy/Activity: Therapeutic Activity  Therapeutic Activity Performed:  (See bed mobility and static sitting balance activity.)    Sensation:  Sensation Comment: Pt reports numbness in her L hand/digits.    Strength:  Strength Comments: RUE shoulder 3+/5, distally 4-/5 (LUE shoulder 2/5, distally 3+/5)    Coordination:  Coordination Comment: MAIN's impaired     Hand Function:  Hand Function  Gross Grasp: Functional  Coordination: Impaired      Outcome Measures: [facility] Daily Activity  Putting on and taking off regular lower body clothing: Total  Bathing (including washing, rinsing, drying): A lot  Putting on and taking off regular upper body clothing: A lot  Toileting, which includes using toilet, bedpan or urinal: Total  Taking care of personal grooming such as brushing teeth: A lot  Eating Meals: A little  Daily Activity - Total Score: 11      Education Documentation  Home Exercise Program, taught by Raymond Webster Jr., OT at 5/22/2024  3:23 PM.  Learner: Patient  Readiness: Acceptance  Method: Explanation  Response: Needs Reinforcement    ADL Training, taught by Raymond Webster Jr., OT at 5/22/2024  3:23 PM.  Learner: Patient  Readiness: Acceptance  Method: Explanation  Response: Needs Reinforcement    Education Comments  No comments found.      Goals:  Encounter Problems       Encounter Problems (Active)       OT Goals       Pt will complete self-feeding with setup to mod indep. (Progressing)       Start:  05/22/24    Expected End:  06/19/24            Pt will complete all grooming  tasks with min A. (Progressing)       Start:  05/22/24    Expected End:  06/19/24            Pt will complete UB dressing/bathing with min A. (Progressing)       Start:  05/22/24    Expected End:  06/19/24            Pt will tolerate sitting EOB for at least 10-15 minutes with F balance in order to enhance ADL's. (Progressing)       Start:  05/22/24    Expected End:  06/19/24            Pt will complete sit<>stand functional transfers with mod A x1-2 using a RW. (Progressing)       Start:  05/22/24    Expected End:  06/19/24

## 2024-05-22 NOTE — CONSULTS
"INFECTIOUS DISEASES CONSULTATION NOTE      Referred by HOLLY Sanchez MD    Reason For Consult  Presacral pressure sores, positive blood cultures    History Of Present Illness  Amrita Lopez is a 83 y.o. female who was sent here from an extended care facility today morning, apparently because the facility was unable to care for her buttock pressure sores.  There was no fever or leukocytosis, but for some reason blood cultures were drawn yesterday afternoon.  I was consulted regarding the pressure sores and also because of the growth of gram-positive cocci from both blood cultures drawn yesterday afternoon.  Patient has no complaints other than the pressure sores which she says \"have been there for a long time, and they just will not heal.\"  She denies fever chills or constitutional symptoms.     Past Medical History  Hyperlipidemia  Bronchiolitis  Stroke  Vitamin B12 deficiency       Social History  Tobacco use: She has apparently been a 1/2 pack/day smoker  Alcohol use: No history of alcohol abuse recorded    Family History  Not pertinent to the current question    Allergies  Nickel     Review of Systems  Detailed review of systems completed.  No significant additional positives beyond what is mentioned above    Physical Exam  Vital signs:  Visit Vitals  BP (!) 128/47 (BP Location: Right arm, Patient Position: Lying)   Pulse 69   Temp 36.6 °C (97.9 °F) (Oral)   Resp 18      General: Obese woman awake, alert, not toxic, in no distress  HEENT:  No scleral icterus or conjunctival suffusion, oral mucosa moist  Nodes:  Negative  Lungs:  Clear to auscultation  Breasts:  Not examined  Heart:  S1, S2 normal, no pathologic murmur appreciated  Abdomen:  Soft, obese, nontender. No palpable organs or masses  Back:  No spinal or CVA tenderness.  Multiple small shallow areas of skin breakdown over the buttocks and perianal area with surrounding rubor  Genitalia: Urinary collection device in place  Extremities: Chronic-appearing " symmetric rubor of both lower extremities and feet  Neurologic:  Alert.  Grossly non-focal.    Skin: Fungal dermatitis both groins    Relevant Results  Results from last 72 hours   Lab Units 05/22/24  0619 05/21/24  1331   WBC AUTO x10*3/uL 9.2 10.5   HEMOGLOBIN g/dL 13.4 13.4   HEMATOCRIT % 42.1 43.0   PLATELETS AUTO x10*3/uL 276 329   NEUTROS PCT AUTO %  --  65.0   LYMPHS PCT AUTO %  --  20.2   MONOS PCT AUTO %  --  6.1   EOS PCT AUTO %  --  7.8     Results from last 72 hours   Lab Units 05/22/24  0619   CREATININE mg/dL 0.80   ANION GAP mmol/L 12   EGFR mL/min/1.73m*2 74     Results from last 72 hours   Lab Units 05/22/24  0619   AST U/L 8   ALT U/L 6   ALK PHOS U/L 95   BILIRUBIN TOTAL mg/dL 0.6     Urinalysis: Pyuria  Microbiology:  Blood (5/21): GPC clusters X2  Imaging:  CXR: No acute pulmonary process      ASSESSMENT:  Obesity/pressure sores  I see no evidence for cellulitis and doubt that there is systemic infection associated with these sores.  Defer to wound care nurse    Positive blood cultures  Awaiting identification of the organism, strongly suspect that these will prove to be contaminants    PLANS:  -   Single dose intravenous vancomycin pending identification of the bloodstream isolate  -   Stop ceftriaxone  -   Local wound care per wound care nurse     THANK YOU FOR ASKING ME TO ASSIST YOU IN THE CARE OF YOUR PATIENT    Bassam De MD  [facility]  Office:  [phone]

## 2024-05-22 NOTE — PROGRESS NOTES
Amrita Lopez is a 83 y.o. female on day 0 of admission presenting with Sacral wound, initial encounter.    Subjective   Patient seen and examined.  Resting in bed in no acute distress.  Awake alert oriented x 3.  Buttock pain 8/10.  + Chills.  No subjective fever.  No urinary symptoms.  No other complaints.      Spoke with nursing bedside, no new issues.     Objective     Physical Exam  Vitals and nursing note reviewed.   Constitutional:       General: She is not in acute distress.     Appearance: Normal appearance. She is obese. She is not ill-appearing, toxic-appearing or diaphoretic.   HENT:      Head: Normocephalic and atraumatic.      Right Ear: Tympanic membrane normal.      Left Ear: Tympanic membrane normal.      Nose: Nose normal.      Mouth/Throat:      Mouth: Mucous membranes are moist.      Pharynx: Oropharynx is clear.      Comments: Dentition poor.  Eyes:      Extraocular Movements: Extraocular movements intact.      Conjunctiva/sclera: Conjunctivae normal.      Pupils: Pupils are equal, round, and reactive to light.   Cardiovascular:      Rate and Rhythm: Normal rate and regular rhythm.      Pulses: Normal pulses.      Heart sounds: Normal heart sounds. No murmur heard.  Pulmonary:      Effort: Pulmonary effort is normal. No respiratory distress.      Breath sounds: Normal breath sounds. No wheezing, rhonchi or rales.   Abdominal:      General: Bowel sounds are normal. There is no distension.      Palpations: Abdomen is soft.      Tenderness: There is no abdominal tenderness.   Genitourinary:     Comments: Deferred.  Musculoskeletal:         General: No swelling or tenderness. Normal range of motion.      Cervical back: Normal range of motion and neck supple.      Comments: Mobility impaired.   Skin:     General: Skin is warm and dry.      Capillary Refill: Capillary refill takes less than 2 seconds.      Comments: Buttock not examined.   Neurological:      General: No focal deficit present.       "Mental Status: She is alert and oriented to person, place, and time.   Psychiatric:         Mood and Affect: Mood normal.         Behavior: Behavior normal.       Last Recorded Vitals  Blood pressure 117/65, pulse 71, temperature 36.5 °C (97.7 °F), temperature source Oral, resp. rate 18, height 1.626 m (5' 4\"), weight 133 kg (293 lb 6.9 oz), SpO2 94%.    Intake/Output last 3 Shifts:  I/O last 3 completed shifts:  In: - (0 mL/kg)   Out: 50 (0.4 mL/kg) [Urine:50 (0 mL/kg/hr)]  Weight: 133.1 kg     Telemetry normal sinus rhythm rate 70's    Relevant Results  Results for orders placed or performed during the hospital encounter of 05/21/24 (from the past 24 hour(s))   CBC and Auto Differential   Result Value Ref Range    WBC 10.5 4.4 - 11.3 x10*3/uL    nRBC 0.0 0.0 - 0.0 /100 WBCs    RBC 4.59 4.00 - 5.20 x10*6/uL    Hemoglobin 13.4 12.0 - 16.0 g/dL    Hematocrit 43.0 36.0 - 46.0 %    MCV 94 80 - 100 fL    MCH 29.2 26.0 - 34.0 pg    MCHC 31.2 (L) 32.0 - 36.0 g/dL    RDW 13.7 11.5 - 14.5 %    Platelets 329 150 - 450 x10*3/uL    Neutrophils % 65.0 40.0 - 80.0 %    Immature Granulocytes %, Automated 0.3 0.0 - 0.9 %    Lymphocytes % 20.2 13.0 - 44.0 %    Monocytes % 6.1 2.0 - 10.0 %    Eosinophils % 7.8 0.0 - 6.0 %    Basophils % 0.6 0.0 - 2.0 %    Neutrophils Absolute 6.86 (H) 1.60 - 5.50 x10*3/uL    Immature Granulocytes Absolute, Automated 0.03 0.00 - 0.50 x10*3/uL    Lymphocytes Absolute 2.13 0.80 - 3.00 x10*3/uL    Monocytes Absolute 0.64 0.05 - 0.80 x10*3/uL    Eosinophils Absolute 0.82 (H) 0.00 - 0.40 x10*3/uL    Basophils Absolute 0.06 0.00 - 0.10 x10*3/uL   Basic Metabolic Panel   Result Value Ref Range    Glucose 96 65 - 99 mg/dL    Sodium 145 133 - 145 mmol/L    Potassium 4.2 3.4 - 5.1 mmol/L    Chloride 107 97 - 107 mmol/L    Bicarbonate 28 24 - 31 mmol/L    Urea Nitrogen 23 8 - 25 mg/dL    Creatinine 1.10 0.40 - 1.60 mg/dL    eGFR 51 (L) >60 mL/min/1.73m*2    Calcium 9.6 8.5 - 10.4 mg/dL    Anion Gap 10 <=19 " mmol/L   Blood Culture    Specimen: Peripheral Venipuncture; Blood culture   Result Value Ref Range    Blood Culture Loaded on Instrument - Culture in progress    Blood Culture    Specimen: Peripheral Venipuncture; Blood culture   Result Value Ref Range    Blood Culture Loaded on Instrument - Culture in progress    Urinalysis with Reflex Microscopic   Result Value Ref Range    Color, Urine Yellow Light-Yellow, Yellow, Dark-Yellow    Appearance, Urine Turbid (N) Clear    Specific Gravity, Urine 1.018 1.005 - 1.035    pH, Urine 6.0 5.0, 5.5, 6.0, 6.5, 7.0, 7.5, 8.0    Protein, Urine 100 (2+) (A) NEGATIVE, 10 (TRACE), 20 (TRACE) mg/dL    Glucose, Urine Normal Normal mg/dL    Blood, Urine 1.0 (3+) (A) NEGATIVE    Ketones, Urine TRACE (A) NEGATIVE mg/dL    Bilirubin, Urine NEGATIVE NEGATIVE    Urobilinogen, Urine Normal Normal mg/dL    Nitrite, Urine 2+ (A) NEGATIVE    Leukocyte Esterase, Urine 500 Estrella/µL (A) NEGATIVE   Microscopic Only, Urine   Result Value Ref Range    WBC, Urine >50 (A) 1-5, NONE /HPF    WBC Clumps, Urine OCCASIONAL Reference range not established. /HPF    RBC, Urine >20 (A) NONE, 1-2, 3-5 /HPF    Squamous Epithelial Cells, Urine 10-25 (FEW) Reference range not established. /HPF    Bacteria, Urine 4+ (A) NONE SEEN /HPF    Mucus, Urine FEW Reference range not established. /LPF    Hyaline Casts, Urine 3+ (A) NONE /LPF    Amorphous Crystals, Urine 1+ NONE, 1+, 2+ /HPF   Comprehensive metabolic panel   Result Value Ref Range    Glucose 105 (H) 65 - 99 mg/dL    Sodium 141 133 - 145 mmol/L    Potassium 3.5 3.4 - 5.1 mmol/L    Chloride 105 97 - 107 mmol/L    Bicarbonate 24 24 - 31 mmol/L    Urea Nitrogen 21 8 - 25 mg/dL    Creatinine 0.80 0.40 - 1.60 mg/dL    eGFR 74 >60 mL/min/1.73m*2    Calcium 9.1 8.5 - 10.4 mg/dL    Albumin 3.4 (L) 3.5 - 5.0 g/dL    Alkaline Phosphatase 95 35 - 125 U/L    Total Protein 6.1 5.9 - 7.9 g/dL    AST 8 5 - 40 U/L    Bilirubin, Total 0.6 0.1 - 1.2 mg/dL    ALT 6 5 - 40 U/L     Anion Gap 12 <=19 mmol/L   CBC   Result Value Ref Range    WBC 9.2 4.4 - 11.3 x10*3/uL    nRBC 0.0 0.0 - 0.0 /100 WBCs    RBC 4.60 4.00 - 5.20 x10*6/uL    Hemoglobin 13.4 12.0 - 16.0 g/dL    Hematocrit 42.1 36.0 - 46.0 %    MCV 92 80 - 100 fL    MCH 29.1 26.0 - 34.0 pg    MCHC 31.8 (L) 32.0 - 36.0 g/dL    RDW 13.6 11.5 - 14.5 %    Platelets 276 150 - 450 x10*3/uL     No results found for the last 90 days.    No results found.    Scheduled medications  ammonium lactate, 1 Application, Topical, BID  aspirin, 81 mg, oral, Daily  atorvastatin, 10 mg, oral, Nightly  cefTRIAXone, 1 g, intravenous, q24h  cholecalciferol, 5,000 Units, oral, Daily  clopidogrel, 75 mg, oral, Daily  cyanocobalamin, 500 mcg, oral, Daily  furosemide, 40 mg, oral, Daily  heparin (porcine), 7,500 Units, subcutaneous, q8h ANNIE  ipratropium-albuteroL, 3 mL, nebulization, TID  loratadine, 10 mg, oral, Daily  meloxicam, 7.5 mg, oral, Daily  nystatin, 1 Application, Topical, BID  pantoprazole, 40 mg, oral, Daily before breakfast  sennosides, 2 tablet, oral, BID  topiramate, 50 mg, oral, Daily  wound dressings, 1 Application, topical (top), Daily      Continuous medications     PRN medications  PRN medications: acetaminophen **OR** acetaminophen **OR** acetaminophen, albuterol, benzocaine-menthol, dextromethorphan-guaifenesin, guaiFENesin, loperamide, magnesium hydroxide, melatonin, ondansetron **OR** ondansetron, ondansetron ODT, polyethylene glycol, zinc oxide      ASSESSMENT:  Sacral wounds  Pressure ulcer risk  Mobility impairment  Pyuria rule out UTI  History of CVA  Hyperlipidemia  Obesity  Chronic bronchiolitis  Vitamin D deficiency  Vitamin B12 deficiency    PLAN:  Urinalysis noted.  IV Ceftriaxone.  Follow-up urine culture.  Blood cultures pending.  Infectious disease consultation.  Wound care nursing consultation.  Local wound care per recommendations.  Pressure ulcer treatment and prevention measures.  Turn and reposition every 2 hours and as  needed.  Analgesics.  P.r.n Tylenol.  PT/OT.  Fall precautions.  Up with assistance only.  DVT prophylaxis.  Heparin subcutaneous.  GI prophylaxis.  PPI Protonix.  Supportive care.  Patient reassured.  Case management following for discharge planning.  Discussed with patient nursing, Dr. Sanchez.      Tiffany Salgado, APRN-CNP

## 2024-05-22 NOTE — CARE PLAN
The patient's goals for the shift include      The clinical goals for the shift include safety, increased skin integrity

## 2024-05-22 NOTE — PROGRESS NOTES
05/22/24 1052   Financial Resource Strain   How hard is it for you to pay for the very basics like food, housing, medical care, and heating? Not hard   Housing Stability   In the last 12 months, was there a time when you were not able to pay the mortgage or rent on time? N   In the last 12 months, how many places have you lived? 1   In the last 12 months, was there a time when you did not have a steady place to sleep or slept in a shelter (including now)? N   Transportation Needs   In the past 12 months, has lack of transportation kept you from medical appointments or from getting medications? no   In the past 12 months, has lack of transportation kept you from meetings, work, or from getting things needed for daily living? No   Food Insecurity   Within the past 12 months, you worried that your food would run out before you got the money to buy more. Never true   Within the past 12 months, the food you bought just didn't last and you didn't have money to get more. Never true   Intimate Partner Violence   Within the last year, have you been afraid of your partner or ex-partner? No   Within the last year, have you been humiliated or emotionally abused in other ways by your partner or ex-partner? No   Within the last year, have you been kicked, hit, slapped, or otherwise physically hurt by your partner or ex-partner? No   Within the last year, have you been raped or forced to have any kind of sexual activity by your partner or ex-partner? No   Utilities   In the past 12 months has the electric, gas, oil, or water company threatened to shut off services in your home? No

## 2024-05-22 NOTE — PROGRESS NOTES
05/22/24 1050   Discharge Planning   Living Arrangements Other (Comment)  (Resides at [facility])   Support Systems Home care staff;Children   Assistance Needed Wound care/ADLs   Type of Residence Assisted living   Do you have animals or pets at home? No   Care Facility Name Resides at [facility]   Who is requesting discharge planning? Provider   Home or Post Acute Services Post acute facilities (Rehab/SNF/etc)   Type of Post Acute Facility Services Skilled nursing;Rehab   Patient expects to be discharged to: Will need SNF choices and precert to admit   Does the patient need discharge transport arranged? Yes   RoundTrip coordination needed? Yes   Patient Choice   Provider Choice list and CMS website (https://medicare.gov/care-compare#search) for post-acute Quality and Resource Measure Data were provided and reviewed with: Patient;Family   Patient / Family choosing to utilize agency / facility established prior to hospitalization No

## 2024-05-22 NOTE — CARE PLAN
The patient's goals for the shift include      The clinical goals for the shift include safety, increased skin integrity    Over the shift, the patient did not make progress toward the following goals. Barriers to progression include . Recommendations to address these barriers include .

## 2024-05-22 NOTE — PROGRESS NOTES
Patient is OBS day 1 for sacral wound.  Met with patient at bedside to discuss discharge planning, patient states that she resides at [facility] and that her son Sacha is her POA.      Call placed to [facility] and spoke with nursing and her PCP Wen from  [facility] ([phone]), both confirmed that patients care needs exceed what they are able to provide at their assisted living facility.   They are hopeful patient would qualify for skilled rehab for PT OT and wound care management.   Patient has been increasingly weak and noncompliant over the past year and has been requiring tay lift transfers to the wheelchair.     Awaiting PT OT evals at this time.      Call placed to patients [facility] to discuss discharge planning, no answer.  Message left.        Discharge plan NOT secure  DO NOT DC without speaking to care coordination

## 2024-05-23 LAB
ANION GAP SERPL CALC-SCNC: 13 MMOL/L
BUN SERPL-MCNC: 19 MG/DL (ref 8–25)
CALCIUM SERPL-MCNC: 9 MG/DL (ref 8.5–10.4)
CHLORIDE SERPL-SCNC: 108 MMOL/L (ref 97–107)
CO2 SERPL-SCNC: 23 MMOL/L (ref 24–31)
CREAT SERPL-MCNC: 0.9 MG/DL (ref 0.4–1.6)
EGFRCR SERPLBLD CKD-EPI 2021: 64 ML/MIN/1.73M*2
ERYTHROCYTE [DISTWIDTH] IN BLOOD BY AUTOMATED COUNT: 13.8 % (ref 11.5–14.5)
GLUCOSE SERPL-MCNC: 100 MG/DL (ref 65–99)
HCT VFR BLD AUTO: 40.5 % (ref 36–46)
HGB BLD-MCNC: 12.5 G/DL (ref 12–16)
MCH RBC QN AUTO: 28.8 PG (ref 26–34)
MCHC RBC AUTO-ENTMCNC: 30.9 G/DL (ref 32–36)
MCV RBC AUTO: 93 FL (ref 80–100)
NRBC BLD-RTO: 0 /100 WBCS (ref 0–0)
PLATELET # BLD AUTO: 288 X10*3/UL (ref 150–450)
POTASSIUM SERPL-SCNC: 3.5 MMOL/L (ref 3.4–5.1)
RBC # BLD AUTO: 4.34 X10*6/UL (ref 4–5.2)
SODIUM SERPL-SCNC: 144 MMOL/L (ref 133–145)
WBC # BLD AUTO: 10.1 X10*3/UL (ref 4.4–11.3)

## 2024-05-23 PROCEDURE — 94640 AIRWAY INHALATION TREATMENT: CPT

## 2024-05-23 PROCEDURE — 9420000001 HC RT PATIENT EDUCATION 5 MIN

## 2024-05-23 PROCEDURE — G0378 HOSPITAL OBSERVATION PER HR: HCPCS

## 2024-05-23 PROCEDURE — 80048 BASIC METABOLIC PNL TOTAL CA: CPT | Performed by: NURSE PRACTITIONER

## 2024-05-23 PROCEDURE — 85027 COMPLETE CBC AUTOMATED: CPT | Performed by: NURSE PRACTITIONER

## 2024-05-23 PROCEDURE — 2500000002 HC RX 250 W HCPCS SELF ADMINISTERED DRUGS (ALT 637 FOR MEDICARE OP, ALT 636 FOR OP/ED): Performed by: INTERNAL MEDICINE

## 2024-05-23 PROCEDURE — 36415 COLL VENOUS BLD VENIPUNCTURE: CPT | Performed by: NURSE PRACTITIONER

## 2024-05-23 PROCEDURE — 96372 THER/PROPH/DIAG INJ SC/IM: CPT | Performed by: INTERNAL MEDICINE

## 2024-05-23 PROCEDURE — 2500000004 HC RX 250 GENERAL PHARMACY W/ HCPCS (ALT 636 FOR OP/ED): Performed by: INTERNAL MEDICINE

## 2024-05-23 PROCEDURE — 87086 URINE CULTURE/COLONY COUNT: CPT | Mod: WESLAB | Performed by: NURSE PRACTITIONER

## 2024-05-23 PROCEDURE — 2500000001 HC RX 250 WO HCPCS SELF ADMINISTERED DRUGS (ALT 637 FOR MEDICARE OP): Performed by: INTERNAL MEDICINE

## 2024-05-23 RX ADMIN — CLOPIDOGREL BISULFATE 75 MG: 75 TABLET ORAL at 08:09

## 2024-05-23 RX ADMIN — Medication 5000 UNITS: at 08:09

## 2024-05-23 RX ADMIN — HEPARIN SODIUM 7500 UNITS: 5000 INJECTION, SOLUTION INTRAVENOUS; SUBCUTANEOUS at 14:54

## 2024-05-23 RX ADMIN — AMMONIUM LACTATE 1 APPLICATION: 120 LOTION TOPICAL at 21:43

## 2024-05-23 RX ADMIN — PANTOPRAZOLE SODIUM 40 MG: 40 TABLET, DELAYED RELEASE ORAL at 06:03

## 2024-05-23 RX ADMIN — AMMONIUM LACTATE 1 APPLICATION: 120 LOTION TOPICAL at 08:10

## 2024-05-23 RX ADMIN — FUROSEMIDE 40 MG: 40 TABLET ORAL at 08:09

## 2024-05-23 RX ADMIN — NYSTATIN 1 APPLICATION: 100000 POWDER TOPICAL at 22:27

## 2024-05-23 RX ADMIN — MELOXICAM 7.5 MG: 7.5 TABLET ORAL at 08:09

## 2024-05-23 RX ADMIN — NYSTATIN 1 APPLICATION: 100000 POWDER TOPICAL at 08:10

## 2024-05-23 RX ADMIN — HEPARIN SODIUM 7500 UNITS: 5000 INJECTION, SOLUTION INTRAVENOUS; SUBCUTANEOUS at 06:04

## 2024-05-23 RX ADMIN — ATORVASTATIN CALCIUM 10 MG: 10 TABLET, FILM COATED ORAL at 21:43

## 2024-05-23 RX ADMIN — TOPIRAMATE 50 MG: 50 TABLET, FILM COATED ORAL at 08:09

## 2024-05-23 RX ADMIN — HEPARIN SODIUM 7500 UNITS: 5000 INJECTION, SOLUTION INTRAVENOUS; SUBCUTANEOUS at 21:43

## 2024-05-23 RX ADMIN — IPRATROPIUM BROMIDE AND ALBUTEROL SULFATE 3 ML: 2.5; .5 SOLUTION RESPIRATORY (INHALATION) at 08:26

## 2024-05-23 RX ADMIN — LORATADINE 10 MG: 10 TABLET ORAL at 08:09

## 2024-05-23 RX ADMIN — CYANOCOBALAMIN TAB 500 MCG 500 MCG: 500 TAB at 08:09

## 2024-05-23 RX ADMIN — IPRATROPIUM BROMIDE AND ALBUTEROL SULFATE 3 ML: 2.5; .5 SOLUTION RESPIRATORY (INHALATION) at 11:11

## 2024-05-23 RX ADMIN — ASPIRIN 81 MG: 81 TABLET, CHEWABLE ORAL at 08:09

## 2024-05-23 ASSESSMENT — COGNITIVE AND FUNCTIONAL STATUS - GENERAL
CLIMB 3 TO 5 STEPS WITH RAILING: TOTAL
DRESSING REGULAR UPPER BODY CLOTHING: A LOT
TOILETING: TOTAL
PERSONAL GROOMING: A LOT
MOBILITY SCORE: 10
EATING MEALS: A LITTLE
TURNING FROM BACK TO SIDE WHILE IN FLAT BAD: A LOT
HELP NEEDED FOR BATHING: A LOT
WALKING IN HOSPITAL ROOM: TOTAL
MOVING FROM LYING ON BACK TO SITTING ON SIDE OF FLAT BED WITH BEDRAILS: A LOT
DRESSING REGULAR LOWER BODY CLOTHING: TOTAL
MOVING TO AND FROM BED TO CHAIR: A LOT
DAILY ACTIVITIY SCORE: 11
STANDING UP FROM CHAIR USING ARMS: A LOT

## 2024-05-23 ASSESSMENT — PAIN SCALES - GENERAL
PAINLEVEL_OUTOF10: 0 - NO PAIN
PAINLEVEL_OUTOF10: 0 - NO PAIN

## 2024-05-23 ASSESSMENT — PAIN SCALES - WONG BAKER: WONGBAKER_NUMERICALRESPONSE: NO HURT

## 2024-05-23 NOTE — CONSULTS
Wound Care Consult     Visit Date: 5/23/2024      Patient Name: Amrita Lopez         MRN: [id number]           YOB: 1940    Consulted for wound care. A 83 y.o. female patient admitted for   Sacral wound, initial encounter [S31.000A]   Past Medical History:   Diagnosis Date    Chronic bronchiolitis (Multi)     Hyperlipidemia     Nicotine dependence     Stroke (Multi)     Vitamin B12 deficiency       Past Surgical History:   Procedure Laterality Date    ADENOIDECTOMY      MR HEAD ANGIO WO IV CONTRAST  11/03/2020    MR HEAD ANGIO WO IV CONTRAST [facility]    MR HEAD ANGIO WO IV CONTRAST  03/29/2021    MR HEAD ANGIO WO IV CONTRAST [facility]    MR NECK ANGIO WO IV CONTRAST  03/30/2021    MR NECK ANGIO WO IV CONTRAST [facility]    TONSILLECTOMY        Scheduled medications  ammonium lactate, 1 Application, Topical, BID  aspirin, 81 mg, oral, Daily  atorvastatin, 10 mg, oral, Nightly  cholecalciferol, 5,000 Units, oral, Daily  clopidogrel, 75 mg, oral, Daily  cyanocobalamin, 500 mcg, oral, Daily  furosemide, 40 mg, oral, Daily  heparin (porcine), 7,500 Units, subcutaneous, q8h ANNIE  ipratropium-albuteroL, 3 mL, nebulization, TID  loratadine, 10 mg, oral, Daily  meloxicam, 7.5 mg, oral, Daily  nystatin, 1 Application, Topical, BID  pantoprazole, 40 mg, oral, Daily before breakfast  sennosides, 2 tablet, oral, BID  topiramate, 50 mg, oral, Daily  wound dressings, 1 Application, topical (top), Daily      Continuous medications     PRN medications  PRN medications: acetaminophen **OR** acetaminophen **OR** acetaminophen, albuterol, benzocaine-menthol, dextromethorphan-guaifenesin, guaiFENesin, loperamide, magnesium hydroxide, melatonin, ondansetron **OR** ondansetron, ondansetron ODT, polyethylene glycol, zinc oxide     Allergies   Allergen Reactions    Nickel Hives        Susceptibility data from last 90 days.  Collected Specimen Info Organism   05/21/24 Blood culture from  Peripheral Venipuncture Staphylococcus epidermidis   05/21/24 Blood culture from Peripheral Venipuncture Staphylococcus epidermidis          Pertinent Labs:   Albumin   Date Value Ref Range Status   05/22/2024 3.4 (L) 3.5 - 5.0 g/dL Final       Wound Assessment:  Wound 10/24/23 Other (comment) Pretibial Left;Lower;Lateral (Active)       Wound 05/21/24 Pressure Injury Buttocks Left (Active)   Wound Image   05/21/24 2136   Site Assessment Maceration;Excoriated;Bleeding;Red 05/21/24 1257   Drainage Description Serosanguineous 05/21/24 1257       Wound 05/21/24 Pressure Injury Buttocks Right (Active)   Wound Image   05/21/24 2150   Site Assessment Bleeding;Maceration;Excoriated;Painful 05/21/24 1259   Maya-Wound Assessment Erythematous 05/21/24 1259   Drainage Description Serosanguineous 05/21/24 1259   Drainage Amount Small 05/21/24 1259       Reason for Consult: consulted for wound care recommendations        Wound History: wound are present on admission and photographed by nursing.     Wound Team Assessment: Reviewed chart, photos and discussed case with nursing. Wound was evaluated by Dr. De and local wound care recommendations were requested. Buttock and posterior thigh wounds do not appear to be over bony prominence and appear to be more full thickness moisture associated skin damage from photo review. Will defer etiology to provider who are performing evaluations of wounds.      Wound Team Plan: Recommend washing buttock wounds with soap/water, rinse, pat dry, apply Triad to periwound, medihoney to wound bed, cover with 4x4 and mepiex to change daily.      Aroldo score is 15 so waffle mattress is indicated. Gab MACKEY bedside nurse updated, continue pressure injury prevention interventions and nursing to continue to follow provider orders. Re consult wound RN PRN.    Chelsea SHERIFFN NARENDRA [facility]  5/23/2024  4:31 PM

## 2024-05-23 NOTE — PROGRESS NOTES
Music Therapy Note    Amrita Lopez was referred by     Therapy Session  Referral Type: New referral this admission  Visit Type: New visit  Session Start Time: 1338  Session End Time: 1349  Intervention Delivery: In-person  Conflict of Service: None  Family Present for Session: None     Pre-assessment  Unable to Assess Reason: Outcomes not applicable  Mood/Affect: Calm, Confused  Verbalized Emotional State: Enjoyment         Treatment/Interventions  Areas of Focus: Coping, Normalization  Music Therapy Interventions: Assessment, Live music listening, Recorded music listening  Interruption: No  Patient Fell Asleep at End of Session: No    Post-assessment  Unable to Assess Reason: Outcomes not applicable  Mood/Affect: Calm, Confused, Tired/lethargic  Continue Visiting: Yes  Total Session Time (min): 11 minutes    Narrative  Assessment Detail: Pt found laying in bed eating lunch. Pt had a confused affect as evidenced by squinting eyes. When pt spoke, speech was minimal and often difficult to interpret. Pt did state enjoying Navneet and Anmol and Rock.  Plan: To improve coping and normalization through music intervention  Intervention: MT played recorded versions of Navneet and Anmol songs. MT played live versions of Brady and Listen to the Music.  Evaluation: Pt would often become confused when asked if she likes artists or songs. Pt was disengaged while music was being played. Pt became tired towards end of session and MT left room.  Follow-up: Will follow up throughout admission    Education Documentation  No documentation found.

## 2024-05-23 NOTE — PROGRESS NOTES
Amrita Lopez is a 83 y.o. female on day 0 of admission presenting with Sacral wound, initial encounter.    Subjective   Patient seen and examined.  Resting in bed in no acute distress.  Awake alert oriented x 3.  No new complaints.  No fevers, chills or sweats.  Buttock pain 8 out of 10 in intensity.    Spoke with nursing, no new issues.    Objective     Physical Exam  Vitals and nursing note reviewed.   Constitutional:       General: She is not in acute distress.     Appearance: Normal appearance. She is obese. She is not ill-appearing, toxic-appearing or diaphoretic.   HENT:      Head: Normocephalic and atraumatic.      Right Ear: Tympanic membrane normal.      Left Ear: Tympanic membrane normal.      Nose: Nose normal.      Mouth/Throat:      Mouth: Mucous membranes are moist.      Pharynx: Oropharynx is clear.      Comments: Dentition poor.  Eyes:      Extraocular Movements: Extraocular movements intact.      Conjunctiva/sclera: Conjunctivae normal.      Pupils: Pupils are equal, round, and reactive to light.   Cardiovascular:      Rate and Rhythm: Normal rate and regular rhythm.      Pulses: Normal pulses.      Heart sounds: Normal heart sounds. No murmur heard.  Pulmonary:      Effort: Pulmonary effort is normal. No respiratory distress.      Breath sounds: Normal breath sounds. No wheezing, rhonchi or rales.   Abdominal:      General: Bowel sounds are normal. There is no distension.      Palpations: Abdomen is soft.      Tenderness: There is no abdominal tenderness.   Genitourinary:     Comments: Deferred.  Musculoskeletal:         General: No swelling or tenderness. Normal range of motion.      Cervical back: Normal range of motion and neck supple.      Comments: Mobility impaired.   Skin:     General: Skin is warm and dry.      Capillary Refill: Capillary refill takes less than 2 seconds.      Comments: Buttock not examined.   Neurological:      General: No focal deficit present.      Mental Status: She  "is alert and oriented to person, place, and time.   Psychiatric:         Mood and Affect: Mood normal.         Behavior: Behavior normal.       Last Recorded Vitals  Blood pressure 118/61, pulse 63, temperature 36.5 °C (97.7 °F), temperature source Oral, resp. rate 17, height 1.626 m (5' 4\"), weight 133 kg (293 lb 6.9 oz), SpO2 100%.    Intake/Output last 3 Shifts:  I/O last 3 completed shifts:  In: 1005 (7.6 mL/kg) [P.O.:655; IV Piggyback:350]  Out: 1350 (10.1 mL/kg) [Urine:1350 (0.3 mL/kg/hr)]  Weight: 133.1 kg     Telemetry normal sinus rhythm rate 60's    Relevant Results  Results for orders placed or performed during the hospital encounter of 05/21/24 (from the past 24 hour(s))   Basic Metabolic Panel   Result Value Ref Range    Glucose 100 (H) 65 - 99 mg/dL    Sodium 144 133 - 145 mmol/L    Potassium 3.5 3.4 - 5.1 mmol/L    Chloride 108 (H) 97 - 107 mmol/L    Bicarbonate 23 (L) 24 - 31 mmol/L    Urea Nitrogen 19 8 - 25 mg/dL    Creatinine 0.90 0.40 - 1.60 mg/dL    eGFR 64 >60 mL/min/1.73m*2    Calcium 9.0 8.5 - 10.4 mg/dL    Anion Gap 13 <=19 mmol/L   CBC   Result Value Ref Range    WBC 10.1 4.4 - 11.3 x10*3/uL    nRBC 0.0 0.0 - 0.0 /100 WBCs    RBC 4.34 4.00 - 5.20 x10*6/uL    Hemoglobin 12.5 12.0 - 16.0 g/dL    Hematocrit 40.5 36.0 - 46.0 %    MCV 93 80 - 100 fL    MCH 28.8 26.0 - 34.0 pg    MCHC 30.9 (L) 32.0 - 36.0 g/dL    RDW 13.8 11.5 - 14.5 %    Platelets 288 150 - 450 x10*3/uL     Susceptibility data from last 90 days.  Collected Specimen Info Organism   05/21/24 Blood culture from Peripheral Venipuncture Staphylococcus epidermidis   05/21/24 Blood culture from Peripheral Venipuncture Staphylococcus epidermidis     No results found.    Scheduled medications  ammonium lactate, 1 Application, Topical, BID  aspirin, 81 mg, oral, Daily  atorvastatin, 10 mg, oral, Nightly  cholecalciferol, 5,000 Units, oral, Daily  clopidogrel, 75 mg, oral, Daily  cyanocobalamin, 500 mcg, oral, Daily  furosemide, 40 mg, " oral, Daily  heparin (porcine), 7,500 Units, subcutaneous, q8h ANNIE  ipratropium-albuteroL, 3 mL, nebulization, TID  loratadine, 10 mg, oral, Daily  meloxicam, 7.5 mg, oral, Daily  nystatin, 1 Application, Topical, BID  pantoprazole, 40 mg, oral, Daily before breakfast  sennosides, 2 tablet, oral, BID  topiramate, 50 mg, oral, Daily  wound dressings, 1 Application, topical (top), Daily      Continuous medications     PRN medications  PRN medications: acetaminophen **OR** acetaminophen **OR** acetaminophen, albuterol, benzocaine-menthol, dextromethorphan-guaifenesin, guaiFENesin, loperamide, magnesium hydroxide, melatonin, ondansetron **OR** ondansetron, ondansetron ODT, polyethylene glycol, zinc oxide      ASSESSMENT:  Sacral wounds  Pressure ulcer risk  Mobility impairment  Pyuria rule out UTI  Positive blood cultures, contaminant  History of CVA  Hyperlipidemia  Obesity  Chronic bronchiolitis  Vitamin D deficiency  Vitamin B12 deficiency    PLAN:  Patient is doing well this morning.  No new issues.  Infectious disease following.  Input appreciated.  IV Ceftriaxone discontinued.  Status post IV Vancomycin x 1 dose.  Cultures reviewed.  Urine culture added.  2/2 blood cultures reviewed Staphylococcus epidermidis.  Per Infectious disease, positive blood cultures contaminants.  Signed off.  Wound care nursing evaluation.  Local wound care per wound care nursing.  Pressure ulceration treatment and prevention measures.  Turn and reposition every 2 hours and as needed.  Analgesics.  P.r.n Tylenol.  Discontinue telemetry.  PT/OT.  Fall precautions.  Up with assistance only.  DVT prophylaxis.  Heparin subcutaneous.  GI prophylaxis.  PPI Protonix.  Supportive care.  Patient reassured.  PT/OT recommend moderate intensity level of continued care.  Case management following for discharge planning.  Anticipate discharge when arrangements are made by case management.  Discussed with patient, nursing and Dr. Spencer Allen  BARTOLO Salgado, APRN-CNP

## 2024-05-23 NOTE — PROGRESS NOTES
05/23/24 1450   Discharge Planning   Patient expects to be discharged to: 1. [place]  2. [place] [facility]- referrals sent in [facility]-need acceptance and precert     MSW met with pt at bedside who would not provide choices for SNF. Pt gave me permission to speak with her son Sacha. MSW called Sacha and choices are  [place]-preference  [place] [facility]    Need acceptance and precert. [place] is a quick turn around.     Safe dc plan not secured

## 2024-05-23 NOTE — PROGRESS NOTES
INFECTIOUS DISEASES PROGRESS NOTE    Consulted / following patient for:  Chronic pressure sores  Positive blood cultures    Subjective   Interval History:   No specific complaints     Objective   PHYSICAL EXAMINATION  Vital signs:  Visit Vitals  /52 (BP Location: Right arm, Patient Position: Lying)   Pulse 73   Temp 36.7 °C (98.1 °F) (Oral)   Resp 18      General: Resting comfortably, no acute distress  Wounds not reexamined by me today    Relevant Results  WBC: 10,100    Results from last 72 hours   Lab Units 05/23/24  0600   CREATININE mg/dL 0.90   ANION GAP mmol/L 13   EGFR mL/min/1.73m*2 64     Results from last 72 hours   Lab Units 05/22/24  0619   AST U/L 8   ALT U/L 6   ALK PHOS U/L 95   BILIRUBIN TOTAL mg/dL 0.6   Urinalysis: Pyuria  Microbiology:  Blood (5/21): GPC clusters X2  Imaging:  CXR: No acute pulmonary process        ASSESSMENT:  Obesity/pressure sores  I see no evidence for cellulitis and doubt that there is systemic infection associated with these sores.  Defer to wound care nurse     Positive blood cultures  Awaiting identification of the organism, strongly suspect that these will prove to be contaminants     PLANS:  -   Single dose intravenous vancomycin given on 5/22, pending identification of the bloodstream isolate  -   Local wound care per wound care nurse    Bassam De MD  [facility] [employer], [email or web address]  Office:  [phone]    Addendum:  As expected, blood cultures growing coag neg Staph, almost certainly CONTAMINANTS in this setting despite isolation from two blood cultures  No further suggestions, will SIGN OFF.  Thank you    [facility]

## 2024-05-23 NOTE — PROGRESS NOTES
05/23/24 1523   Discharge Planning   Patient expects to be discharged to: [facility]-Precert started 5/23     [facility] can accept. Anticipate precert to be back by tomorrow 5/24.    Safe dc plan not secured-need precert

## 2024-05-23 NOTE — NURSING NOTE
No change from previous assessment. Pt resting comfortably in bed with call light in reach. Wounds still draining to B/L buttocks and thighs.

## 2024-05-23 NOTE — PROGRESS NOTES
MSW went to meet with pt at bedside to get SNF choices, pt was sound asleep upon entering room. Pt TV was on loud and pt did not answer to me calling her name. MSW will follow up again for SNF choices later today. Pt will need a precert.    Safe dc plan not secured

## 2024-05-23 NOTE — CARE PLAN
The patient's goals for the shift include      The clinical goals for the shift include safety, wound care, incontinence management    Over the shift, the patient did not make progress toward the following goals. Barriers to progression include none. Recommendations to address these barriers include assist with ADLs  Problem: Skin  Goal: Decreased wound size/increased tissue granulation at next dressing change  Outcome: Progressing  Goal: Participates in plan/prevention/treatment measures  Outcome: Progressing  Goal: Prevent/manage excess moisture  Outcome: Progressing  Goal: Prevent/minimize sheer/friction injuries  Outcome: Progressing  Goal: Promote/optimize nutrition  Outcome: Progressing  Flowsheets (Taken 5/23/2024 0809)  Promote/optimize nutrition:   Consume > 50% meals/supplements   Offer water/supplements/favorite foods  Goal: Promote skin healing  Outcome: Progressing     Problem: Fall/Injury  Goal: Not fall by end of shift  Outcome: Progressing  Goal: Be free from injury by end of the shift  Outcome: Progressing  Goal: Verbalize understanding of personal risk factors for fall in the hospital  Outcome: Progressing  Goal: Verbalize understanding of risk factor reduction measures to prevent injury from fall in the home  Outcome: Progressing  Goal: Use assistive devices by end of the shift  Outcome: Progressing  Goal: Pace activities to prevent fatigue by end of the shift  Outcome: Progressing     Problem: Pain  Goal: Turns in bed with improved pain control throughout the shift  Outcome: Progressing  Goal: Walks with improved pain control throughout the shift  Outcome: Progressing  Goal: Performs ADL's with improved pain control throughout shift  Outcome: Progressing  Goal: Participates in PT with improved pain control throughout the shift  Outcome: Progressing  Goal: Free from opioid side effects throughout the shift  Outcome: Progressing  Goal: Free from acute confusion related to pain meds throughout the  shift  Outcome: Progressing     Problem: Daily Care  Goal: Daily care needs are met  Outcome: Progressing     Problem: Psychosocial Needs  Goal: Demonstrates ability to cope with hospitalization/illness  Outcome: Progressing  Goal: Collaborate with me, my family, and caregiver to identify my specific goals  Outcome: Progressing     Problem: Discharge Barriers  Goal: My discharge needs are met  Outcome: Progressing     Problem: Respiratory  Goal: Clear secretions with interventions this shift  Outcome: Progressing  Goal: Minimize anxiety/maximize coping throughout shift  Outcome: Progressing  Goal: Minimal/no exertional discomfort or dyspnea this shift  Outcome: Progressing  Goal: No signs of respiratory distress (eg. Use of accessory muscles. Peds grunting)  Outcome: Progressing  Goal: Patent airway maintained this shift  Outcome: Progressing  Goal: Tolerate mechanical ventilation evidenced by VS/agitation level this shift  Outcome: Progressing  Goal: Tolerate pulmonary toileting this shift  Outcome: Progressing  Goal: Verbalize decreased shortness of breath this shift  Outcome: Progressing  Goal: Wean oxygen to maintain O2 saturation per order/standard this shift  Outcome: Progressing  Goal: Increase self care and/or family involvement in next 24 hours  Outcome: Progressing   .

## 2024-05-24 VITALS
DIASTOLIC BLOOD PRESSURE: 53 MMHG | BODY MASS INDEX: 50.02 KG/M2 | WEIGHT: 293 LBS | SYSTOLIC BLOOD PRESSURE: 137 MMHG | TEMPERATURE: 97.4 F | OXYGEN SATURATION: 97 % | HEART RATE: 70 BPM | RESPIRATION RATE: 16 BRPM | HEIGHT: 64 IN

## 2024-05-24 LAB
ANION GAP SERPL CALC-SCNC: 9 MMOL/L
BACTERIA BLD AEROBE CULT: ABNORMAL
BACTERIA BLD CULT: ABNORMAL
BUN SERPL-MCNC: 21 MG/DL (ref 8–25)
CALCIUM SERPL-MCNC: 9.4 MG/DL (ref 8.5–10.4)
CHLORIDE SERPL-SCNC: 109 MMOL/L (ref 97–107)
CO2 SERPL-SCNC: 25 MMOL/L (ref 24–31)
CREAT SERPL-MCNC: 0.9 MG/DL (ref 0.4–1.6)
EGFRCR SERPLBLD CKD-EPI 2021: 64 ML/MIN/1.73M*2
GLUCOSE SERPL-MCNC: 95 MG/DL (ref 65–99)
GRAM STN SPEC: ABNORMAL
GRAM STN SPEC: ABNORMAL
HOLD SPECIMEN: NORMAL
POTASSIUM SERPL-SCNC: 3.7 MMOL/L (ref 3.4–5.1)
SODIUM SERPL-SCNC: 143 MMOL/L (ref 133–145)

## 2024-05-24 PROCEDURE — 94664 DEMO&/EVAL PT USE INHALER: CPT

## 2024-05-24 PROCEDURE — 2500000004 HC RX 250 GENERAL PHARMACY W/ HCPCS (ALT 636 FOR OP/ED): Performed by: INTERNAL MEDICINE

## 2024-05-24 PROCEDURE — 2500000002 HC RX 250 W HCPCS SELF ADMINISTERED DRUGS (ALT 637 FOR MEDICARE OP, ALT 636 FOR OP/ED): Performed by: INTERNAL MEDICINE

## 2024-05-24 PROCEDURE — 80048 BASIC METABOLIC PNL TOTAL CA: CPT | Performed by: NURSE PRACTITIONER

## 2024-05-24 PROCEDURE — 36415 COLL VENOUS BLD VENIPUNCTURE: CPT | Performed by: NURSE PRACTITIONER

## 2024-05-24 PROCEDURE — 96372 THER/PROPH/DIAG INJ SC/IM: CPT | Performed by: INTERNAL MEDICINE

## 2024-05-24 PROCEDURE — 94640 AIRWAY INHALATION TREATMENT: CPT

## 2024-05-24 PROCEDURE — G0378 HOSPITAL OBSERVATION PER HR: HCPCS

## 2024-05-24 PROCEDURE — 2500000001 HC RX 250 WO HCPCS SELF ADMINISTERED DRUGS (ALT 637 FOR MEDICARE OP): Performed by: INTERNAL MEDICINE

## 2024-05-24 PROCEDURE — 9420000001 HC RT PATIENT EDUCATION 5 MIN

## 2024-05-24 RX ORDER — MELOXICAM 7.5 MG/1
7.5 TABLET ORAL DAILY
Start: 2024-05-24

## 2024-05-24 RX ORDER — NYSTATIN 100000 [USP'U]/G
1 POWDER TOPICAL 2 TIMES DAILY
Start: 2024-05-24

## 2024-05-24 RX ORDER — ATORVASTATIN CALCIUM 10 MG/1
10 TABLET, FILM COATED ORAL NIGHTLY
Start: 2024-05-24

## 2024-05-24 RX ADMIN — CYANOCOBALAMIN TAB 500 MCG 500 MCG: 500 TAB at 10:36

## 2024-05-24 RX ADMIN — IPRATROPIUM BROMIDE AND ALBUTEROL SULFATE 3 ML: 2.5; .5 SOLUTION RESPIRATORY (INHALATION) at 07:41

## 2024-05-24 RX ADMIN — ASPIRIN 81 MG: 81 TABLET, CHEWABLE ORAL at 10:38

## 2024-05-24 RX ADMIN — FUROSEMIDE 40 MG: 40 TABLET ORAL at 10:36

## 2024-05-24 RX ADMIN — CLOPIDOGREL BISULFATE 75 MG: 75 TABLET ORAL at 10:36

## 2024-05-24 RX ADMIN — NYSTATIN 1 APPLICATION: 100000 POWDER TOPICAL at 12:41

## 2024-05-24 RX ADMIN — PANTOPRAZOLE SODIUM 40 MG: 40 TABLET, DELAYED RELEASE ORAL at 06:05

## 2024-05-24 RX ADMIN — HEPARIN SODIUM 7500 UNITS: 5000 INJECTION, SOLUTION INTRAVENOUS; SUBCUTANEOUS at 06:05

## 2024-05-24 RX ADMIN — MELOXICAM 7.5 MG: 7.5 TABLET ORAL at 10:38

## 2024-05-24 RX ADMIN — ACETAMINOPHEN 650 MG: 325 TABLET ORAL at 10:46

## 2024-05-24 RX ADMIN — AMMONIUM LACTATE 1 APPLICATION: 120 LOTION TOPICAL at 12:41

## 2024-05-24 RX ADMIN — Medication 5000 UNITS: at 10:45

## 2024-05-24 RX ADMIN — LORATADINE 10 MG: 10 TABLET ORAL at 10:38

## 2024-05-24 RX ADMIN — TOPIRAMATE 50 MG: 50 TABLET, FILM COATED ORAL at 10:38

## 2024-05-24 ASSESSMENT — COGNITIVE AND FUNCTIONAL STATUS - GENERAL
CLIMB 3 TO 5 STEPS WITH RAILING: TOTAL
STANDING UP FROM CHAIR USING ARMS: A LOT
DRESSING REGULAR UPPER BODY CLOTHING: A LOT
DAILY ACTIVITIY SCORE: 11
MOVING FROM LYING ON BACK TO SITTING ON SIDE OF FLAT BED WITH BEDRAILS: A LOT
TOILETING: TOTAL
EATING MEALS: A LITTLE
DRESSING REGULAR LOWER BODY CLOTHING: TOTAL
TURNING FROM BACK TO SIDE WHILE IN FLAT BAD: A LOT
WALKING IN HOSPITAL ROOM: TOTAL
MOBILITY SCORE: 10
PERSONAL GROOMING: A LOT
MOVING TO AND FROM BED TO CHAIR: A LOT
HELP NEEDED FOR BATHING: A LOT

## 2024-05-24 ASSESSMENT — PAIN SCALES - GENERAL
PAINLEVEL_OUTOF10: 6
PAINLEVEL_OUTOF10: 4

## 2024-05-24 ASSESSMENT — PAIN - FUNCTIONAL ASSESSMENT
PAIN_FUNCTIONAL_ASSESSMENT: 0-10
PAIN_FUNCTIONAL_ASSESSMENT: 0-10

## 2024-05-24 ASSESSMENT — PAIN DESCRIPTION - ORIENTATION: ORIENTATION: RIGHT;LEFT

## 2024-05-24 NOTE — NURSING NOTE
Wound care provided to bilateral wounds on buttocks. Medihoney and mepilex boarders applied to both wounds. Other topical skin treatments applied.

## 2024-05-24 NOTE — CARE PLAN
Problem: Respiratory  Goal: Clear secretions with interventions this shift  Outcome: Progressing  Goal: Minimize anxiety/maximize coping throughout shift  Outcome: Progressing  Goal: Minimal/no exertional discomfort or dyspnea this shift  Outcome: Progressing  Goal: No signs of respiratory distress (eg. Use of accessory muscles. Peds grunting)  Outcome: Progressing  Goal: Patent airway maintained this shift  Outcome: Progressing  Goal: Tolerate mechanical ventilation evidenced by VS/agitation level this shift  Outcome: Met  Goal: Tolerate pulmonary toileting this shift  Outcome: Not Progressing  Goal: Verbalize decreased shortness of breath this shift  Outcome: Progressing  Goal: Wean oxygen to maintain O2 saturation per order/standard this shift  Outcome: Met  Goal: Increase self care and/or family involvement in next 24 hours  Outcome: Progressing     Problem: Respiratory  Goal: Tolerate pulmonary toileting this shift  Outcome: Not Progressing

## 2024-05-24 NOTE — PROGRESS NOTES
Amrita Lopez is a 83 y.o. female on day 0 of admission presenting with Sacral wound, initial encounter.    Subjective   Patient seen and examined.  Resting in bed in no acute distress.  Awake alert oriented x 3.  No new complaints.  Buttock pain 6/10, controlled.      Spoke with nursing, no new issues.    Objective     Physical Exam  Vitals and nursing note reviewed.   Constitutional:       General: She is not in acute distress.     Appearance: Normal appearance. She is obese. She is not ill-appearing, toxic-appearing or diaphoretic.   HENT:      Head: Normocephalic and atraumatic.      Right Ear: Tympanic membrane normal.      Left Ear: Tympanic membrane normal.      Nose: Nose normal.      Mouth/Throat:      Mouth: Mucous membranes are moist.      Pharynx: Oropharynx is clear.      Comments: Dentition poor.  Eyes:      Extraocular Movements: Extraocular movements intact.      Conjunctiva/sclera: Conjunctivae normal.      Pupils: Pupils are equal, round, and reactive to light.   Cardiovascular:      Rate and Rhythm: Normal rate and regular rhythm.      Pulses: Normal pulses.      Heart sounds: Normal heart sounds. No murmur heard.  Pulmonary:      Effort: Pulmonary effort is normal. No respiratory distress.      Breath sounds: Normal breath sounds. No wheezing, rhonchi or rales.   Abdominal:      General: Bowel sounds are normal. There is no distension.      Palpations: Abdomen is soft.      Tenderness: There is no abdominal tenderness.   Genitourinary:     Comments: Deferred.  Musculoskeletal:         General: No swelling or tenderness. Normal range of motion.      Cervical back: Normal range of motion and neck supple.      Comments: Mobility impaired.   Skin:     General: Skin is warm and dry.      Capillary Refill: Capillary refill takes less than 2 seconds.      Comments: Buttock not examined.   Neurological:      General: No focal deficit present.      Mental Status: She is alert and oriented to person,  "place, and time.   Psychiatric:         Mood and Affect: Mood normal.         Behavior: Behavior normal.       Last Recorded Vitals  Blood pressure 137/53, pulse 70, temperature 36.3 °C (97.4 °F), temperature source Oral, resp. rate 16, height 1.626 m (5' 4\"), weight 133 kg (293 lb 6.9 oz), SpO2 97%.    Intake/Output last 3 Shifts:  I/O last 3 completed shifts:  In: 340 (2.6 mL/kg) [P.O.:340]  Out: 1600 (12 mL/kg) [Urine:1600 (0.3 mL/kg/hr)]  Weight: 133.1 kg     Relevant Results  Results for orders placed or performed during the hospital encounter of 05/21/24 (from the past 24 hour(s))   Basic Metabolic Panel   Result Value Ref Range    Glucose 95 65 - 99 mg/dL    Sodium 143 133 - 145 mmol/L    Potassium 3.7 3.4 - 5.1 mmol/L    Chloride 109 (H) 97 - 107 mmol/L    Bicarbonate 25 24 - 31 mmol/L    Urea Nitrogen 21 8 - 25 mg/dL    Creatinine 0.90 0.40 - 1.60 mg/dL    eGFR 64 >60 mL/min/1.73m*2    Calcium 9.4 8.5 - 10.4 mg/dL    Anion Gap 9 <=19 mmol/L   Lavender Top   Result Value Ref Range    Extra Tube Hold for add-ons.      Susceptibility data from last 90 days.  Collected Specimen Info Organism Clindamycin Erythromycin Oxacillin Tetracycline Trimethoprim/Sulfamethoxazole Vancomycin   05/21/24 Blood culture from Peripheral Venipuncture Staphylococcus epidermidis  S  S  S  S  S  S   05/21/24 Blood culture from Peripheral Venipuncture Staphylococcus epidermidis           No results found.    Scheduled medications  ammonium lactate, 1 Application, Topical, BID  aspirin, 81 mg, oral, Daily  atorvastatin, 10 mg, oral, Nightly  cholecalciferol, 5,000 Units, oral, Daily  clopidogrel, 75 mg, oral, Daily  cyanocobalamin, 500 mcg, oral, Daily  furosemide, 40 mg, oral, Daily  heparin (porcine), 7,500 Units, subcutaneous, q8h ANNIE  ipratropium-albuteroL, 3 mL, nebulization, TID  loratadine, 10 mg, oral, Daily  meloxicam, 7.5 mg, oral, Daily  nystatin, 1 Application, Topical, BID  pantoprazole, 40 mg, oral, Daily before " breakfast  sennosides, 2 tablet, oral, BID  topiramate, 50 mg, oral, Daily  wound dressings, 1 Application, topical (top), Daily      Continuous medications     PRN medications  PRN medications: acetaminophen **OR** acetaminophen **OR** acetaminophen, albuterol, benzocaine-menthol, dextromethorphan-guaifenesin, guaiFENesin, loperamide, magnesium hydroxide, melatonin, ondansetron **OR** ondansetron, ondansetron ODT, polyethylene glycol, zinc oxide        ASSESSMENT:  Sacral wounds  Pressure ulcer risk  Mobility impairment  Pyuria rule out UTI  Positive blood cultures, contaminant  History of CVA  Hyperlipidemia  Obesity  Chronic bronchiolitis  Vitamin D deficiency  Vitamin B12 deficiency    PLAN:  Patient is doing well this morning.  No new issues.  Consultations input appreciated.  Buttock pain controlled.  Wound care nursing input appreciated.  Local care per recommendations.  Pressure ulcer treatment and prevention measures.  Turn and reposition every 2 hours and as needed.  As needed analgesics Tylenol.  PT/OT.  Fall precautions.  Up with assistance only.  Prophylaxis.  Heparin subcutaneous.  GI prophylaxis.  PPI Protonix.  Supportive care.  Patient reassured.  PT/OT recommend moderate intensity level of continued care.  Case management following for discharge planning.  Discharge plan to [facility] nursing rehabilitation facility.  Discussed with Dr. Sanchez.  Okay to discharge when arrangements are made by case management.  Discussed with patient, nursing and Dr. Sanchez.        Tiffany Salgado, APRN-CNP

## 2024-05-24 NOTE — NURSING NOTE
Pt incontinent of stool and urine, complete bed bath given, medihoney and triad applied to all wounds, wounds to posterior thighs and buttocks bleeding onto bedding, pure wick reapplied, nystatin power applied to all folds,

## 2024-05-24 NOTE — DISCHARGE SUMMARY
Discharge Diagnosis  Sacral/buttock/posterior thigh wounds  Pressure ulcer risk  Mobility impairment  Pyuria    Positive blood cultures, contaminant  History of CVA  Hyperlipidemia  Obesity  Chronic bronchiolitis  Vitamin D deficiency  Vitamin B12 deficiency    Issues Requiring Follow-Up  Above    Discharge Meds     Your medication list        CHANGE how you take these medications        Instructions Last Dose Given Next Dose Due   atorvastatin 10 mg tablet  Commonly known as: Lipitor  What changed: when to take this      Take 1 tablet (10 mg) by mouth once daily at bedtime.       meloxicam 7.5 mg tablet  Commonly known as: Mobic  What changed: additional instructions      Take 1 tablet (7.5 mg) by mouth once daily. Administer with food to reduce GI upset.       nystatin 100,000 unit/gram powder  Commonly known as: Mycostatin  What changed: additional instructions      Apply 1 Application topically 2 times a day. Apply to groin.       ondansetron ODT 4 mg disintegrating tablet  Commonly known as: Zofran-ODT  What changed: when to take this      Take 1 tablet (4 mg) by mouth every 8 hours if needed for nausea or vomiting.       sennosides 8.6 mg tablet  Commonly known as: Senokot  What changed:   when to take this  reasons to take this      Take 2 tablets (17.2 mg) by mouth 2 times a day.              CONTINUE taking these medications        Instructions Last Dose Given Next Dose Due   acetaminophen 325 mg tablet  Commonly known as: Tylenol           albuterol 108 (90 Base) MCG/ACT inhaler           ammonium lactate 12 % lotion  Commonly known as: Lac-Hydrin           aspirin 81 mg chewable tablet           cholecalciferol 5,000 Units tablet  Commonly known as: Vitamin D-3           clopidogrel 75 mg tablet  Commonly known as: Plavix           cyanocobalamin 1,000 mcg tablet  Commonly known as: Vitamin B-12           furosemide 40 mg tablet  Commonly known as: Lasix           inhalational spacing device inhaler       "Use as directed with inhalers       ipratropium-albuteroL 0.5-2.5 mg/3 mL nebulizer solution  Commonly known as: Duo-Neb      Take 3 mL by nebulization every 6 hours.       loperamide 2 mg tablet  Commonly known as: Imodium A-D      Take 1 tablet (2 mg) by mouth 4 times a day as needed for diarrhea. Take 2 tablets (4mg) po with first loose stool, and 1 tablet (2mg) with each additional loose stool. Not to exceed 16mg/day.       loratadine 10 mg tablet  Commonly known as: Claritin           magnesium hydroxide 2,400 mg/10 mL suspension suspension  Commonly known as: Milk of Magnesia           omeprazole OTC 20 mg EC tablet  Commonly known as: PriLOSEC OTC           polyethylene glycol 17 gram packet  Commonly known as: Glycolax, Miralax      Take 17 g by mouth once daily as needed (constipation).       topiramate 50 mg tablet  Commonly known as: Topamax      Take 1 tablet (50 mg) by mouth once daily.       Triad Wound Dressing paste  Generic drug: wound dressings      Apply 1 Application topically once daily. Apply to affected areas (buttocks, posterior thighs) once daily, do not scrub off prior application when reapplying. Apply 1/16\" thick.       Zinc-Oxyde Plus 0.44-20 % ointment  Generic drug: menthol-zinc oxide                     Where to Get Your Medications        Information about where to get these medications is not yet available    Ask your nurse or doctor about these medications  atorvastatin 10 mg tablet  meloxicam 7.5 mg tablet  nystatin 100,000 unit/gram powder         Test Results Pending At Discharge  Pending Labs       Order Current Status    Urine culture In process    Blood Culture Preliminary result            Hospital Course  This is a very pleasant 83-year-old obese  female presenting to the emergency department with sacral/buttock/posterior thigh wounds.  She is a resident of an assisted living.  Per the record, the assisted living was unable to care for the patient due to the " wounds.  She presented to the emergency department for evaluation.  In the emergency department, initial workup was done.  Urinalysis was consistent with a possible urinary tract infection.  Blood cultures showed a possible infection.  She was treated with antibiotics.  She was evaluated and treated by Infectious disease.  The urine culture was without growth.  Per Infectious disease, the positive blood cultures are contamination.  Antibiotics were discontinued and Infectious disease signed off.  She was evaluated by wound care nursing and treated with local wound care.  She was treated with as needed analgesics.  Her overall condition improved.  She was evaluated by physical, occupational therapy - recommend moderate intensity level of continued care.  Case management was consulted for discharge planning.  She is stable for discharge to skilled nursing rehabilitation.    Pertinent Physical Exam At Time of Discharge  See physical examination    Outpatient Follow-Up  No future appointments.    Follow-up with primary care provider in 1 week.    RADHA Elizalde-CNP

## 2024-05-24 NOTE — CARE PLAN
Patient refused aerosol tx.; no respiratory distress observed; SpO2 96%, hr 71, rr 18; BS diminished & clear bilaterally.

## 2024-05-24 NOTE — PROGRESS NOTES
05/24/24 1039   Discharge Planning   Patient expects to be discharged to: [place]-precert obtained       -[place] attached in [facility]  -Need -PAS completed in [facility] due to OBS  -[facility] attached in [facility]  -Pt transport in [facility] 1:45pm   -RN updated in [place] with NTN   -Called pt eron Sacha to update and left vm regarding dc    The S Vehicle you requested for Amrita GANT in unit/room 415A on 05/24/2024 is scheduled to arrive at 1:45pm EDT! [facility] is handling this ride and you can contact them at [phone]

## 2024-05-24 NOTE — NURSING NOTE
This nurse called [facility] and gave report to THELMA Cornejo. All questions answered.

## 2024-05-25 LAB — BACTERIA UR CULT: NORMAL

## 2024-05-26 LAB
BACTERIA BLD AEROBE CULT: ABNORMAL
BACTERIA BLD CULT: ABNORMAL
GRAM STN SPEC: ABNORMAL

## 2024-11-30 ENCOUNTER — APPOINTMENT (OUTPATIENT)
Dept: RADIOLOGY | Facility: HOSPITAL | Age: 84
End: 2024-11-30
Payer: MEDICARE

## 2024-11-30 ENCOUNTER — HOSPITAL ENCOUNTER (INPATIENT)
Facility: HOSPITAL | Age: 84
LOS: 6 days | Discharge: NURSING, REHAB OR LONG-TERM CARE | End: 2024-12-06
Attending: STUDENT IN AN ORGANIZED HEALTH CARE EDUCATION/TRAINING PROGRAM | Admitting: INTERNAL MEDICINE
Payer: MEDICARE

## 2024-11-30 ENCOUNTER — APPOINTMENT (OUTPATIENT)
Dept: CARDIOLOGY | Facility: HOSPITAL | Age: 84
End: 2024-11-30
Payer: MEDICARE

## 2024-11-30 DIAGNOSIS — K57.92 DIVERTICULITIS: ICD-10-CM

## 2024-11-30 DIAGNOSIS — B37.0 ORAL THRUSH: ICD-10-CM

## 2024-11-30 DIAGNOSIS — R05.1 ACUTE COUGH: ICD-10-CM

## 2024-11-30 DIAGNOSIS — R06.02 SHORTNESS OF BREATH: Primary | ICD-10-CM

## 2024-11-30 DIAGNOSIS — K12.1 MOUTH ULCERS: ICD-10-CM

## 2024-11-30 DIAGNOSIS — S31.000A SACRAL WOUND, INITIAL ENCOUNTER: ICD-10-CM

## 2024-11-30 DIAGNOSIS — R23.9 ALTERATION IN SKIN INTEGRITY DUE TO MOISTURE: ICD-10-CM

## 2024-11-30 DIAGNOSIS — Z22.322 METHICILLIN RESISTANT STAPHYLOCOCCUS AUREUS COLONIZATION: ICD-10-CM

## 2024-11-30 DIAGNOSIS — D72.829 LEUKOCYTOSIS, UNSPECIFIED TYPE: ICD-10-CM

## 2024-11-30 LAB
ALBUMIN SERPL BCP-MCNC: 2.5 G/DL (ref 3.4–5)
ALP SERPL-CCNC: 91 U/L (ref 33–136)
ALT SERPL W P-5'-P-CCNC: 11 U/L (ref 7–45)
ANION GAP BLDA CALCULATED.4IONS-SCNC: 18 MMO/L (ref 10–25)
ANION GAP SERPL CALCULATED.3IONS-SCNC: 20 MMOL/L (ref 10–20)
APPARATUS: ABNORMAL
APPEARANCE UR: ABNORMAL
ARTERIAL PATENCY WRIST A: POSITIVE
AST SERPL W P-5'-P-CCNC: 10 U/L (ref 9–39)
BACTERIA #/AREA URNS AUTO: ABNORMAL /HPF
BASE EXCESS BLDA CALC-SCNC: -10.4 MMOL/L (ref -2–3)
BASOPHILS # BLD MANUAL: 0 X10*3/UL (ref 0–0.1)
BASOPHILS NFR BLD MANUAL: 0 %
BILIRUB DIRECT SERPL-MCNC: 0.1 MG/DL (ref 0–0.3)
BILIRUB SERPL-MCNC: 0.5 MG/DL (ref 0–1.2)
BILIRUB UR STRIP.AUTO-MCNC: NEGATIVE MG/DL
BNP SERPL-MCNC: 282 PG/ML (ref 0–99)
BODY TEMPERATURE: 37 DEGREES CELSIUS
BUN SERPL-MCNC: 94 MG/DL (ref 6–23)
BURR CELLS BLD QL SMEAR: ABNORMAL
CA-I BLDA-SCNC: 1.29 MMOL/L (ref 1.1–1.33)
CALCIUM SERPL-MCNC: 8.9 MG/DL (ref 8.6–10.3)
CARDIAC TROPONIN I PNL SERPL HS: 17 NG/L (ref 0–13)
CARDIAC TROPONIN I PNL SERPL HS: 20 NG/L (ref 0–13)
CHLORIDE BLDA-SCNC: 103 MMOL/L (ref 98–107)
CHLORIDE SERPL-SCNC: 103 MMOL/L (ref 98–107)
CK SERPL-CCNC: 11 U/L (ref 0–215)
CO2 SERPL-SCNC: 16 MMOL/L (ref 21–32)
COLOR UR: ABNORMAL
CREAT SERPL-MCNC: 1.98 MG/DL (ref 0.5–1.05)
EGFRCR SERPLBLD CKD-EPI 2021: 25 ML/MIN/1.73M*2
EOSINOPHIL # BLD MANUAL: 0 X10*3/UL (ref 0–0.4)
EOSINOPHIL NFR BLD MANUAL: 0 %
ERYTHROCYTE [DISTWIDTH] IN BLOOD BY AUTOMATED COUNT: 16.5 % (ref 11.5–14.5)
GLUCOSE BLDA-MCNC: 137 MG/DL (ref 74–99)
GLUCOSE SERPL-MCNC: 217 MG/DL (ref 74–99)
GLUCOSE UR STRIP.AUTO-MCNC: NORMAL MG/DL
HCO3 BLDA-SCNC: 14.6 MMOL/L (ref 22–26)
HCT VFR BLD AUTO: 39.5 % (ref 36–46)
HCT VFR BLD EST: 35 % (ref 36–46)
HGB BLD-MCNC: 12.4 G/DL (ref 12–16)
HGB BLDA-MCNC: 11.8 G/DL (ref 12–16)
HYALINE CASTS #/AREA URNS AUTO: ABNORMAL /LPF
IMM GRANULOCYTES # BLD AUTO: 0.66 X10*3/UL (ref 0–0.5)
IMM GRANULOCYTES NFR BLD AUTO: 2.3 % (ref 0–0.9)
INHALED O2 CONCENTRATION: 36 %
KETONES UR STRIP.AUTO-MCNC: NEGATIVE MG/DL
LACTATE BLDA-SCNC: 2.3 MMOL/L (ref 0.4–2)
LACTATE BLDV-SCNC: 4 MMOL/L (ref 0.4–2)
LACTATE SERPL-SCNC: 2 MMOL/L (ref 0.4–2)
LACTATE SERPL-SCNC: 2.4 MMOL/L (ref 0.4–2)
LEUKOCYTE ESTERASE UR QL STRIP.AUTO: NEGATIVE
LYMPHOCYTES # BLD MANUAL: 1.46 X10*3/UL (ref 0.8–3)
LYMPHOCYTES NFR BLD MANUAL: 5 %
MCH RBC QN AUTO: 27.8 PG (ref 26–34)
MCHC RBC AUTO-ENTMCNC: 31.4 G/DL (ref 32–36)
MCV RBC AUTO: 89 FL (ref 80–100)
METAMYELOCYTES # BLD MANUAL: 0.58 X10*3/UL
METAMYELOCYTES NFR BLD MANUAL: 2 %
MONOCYTES # BLD MANUAL: 0.29 X10*3/UL (ref 0.05–0.8)
MONOCYTES NFR BLD MANUAL: 1 %
MRSA DNA SPEC QL NAA+PROBE: DETECTED
MUCOUS THREADS #/AREA URNS AUTO: ABNORMAL /LPF
NEUTROPHILS # BLD MANUAL: 26.77 X10*3/UL (ref 1.6–5.5)
NEUTS BAND # BLD MANUAL: 18.33 X10*3/UL (ref 0–0.5)
NEUTS BAND NFR BLD MANUAL: 63 %
NEUTS SEG # BLD MANUAL: 8.44 X10*3/UL (ref 1.6–5)
NEUTS SEG NFR BLD MANUAL: 29 %
NITRITE UR QL STRIP.AUTO: NEGATIVE
NRBC BLD-RTO: 0 /100 WBCS (ref 0–0)
OXYHGB MFR BLDA: 95.9 % (ref 94–98)
PCO2 BLDA: 29 MM HG (ref 38–42)
PH BLDA: 7.31 PH (ref 7.38–7.42)
PH UR STRIP.AUTO: 8 [PH]
PLATELET # BLD AUTO: 448 X10*3/UL (ref 150–450)
PO2 BLDA: 86 MM HG (ref 85–95)
POTASSIUM BLDA-SCNC: 3.6 MMOL/L (ref 3.5–5.3)
POTASSIUM SERPL-SCNC: 3.7 MMOL/L (ref 3.5–5.3)
PROT SERPL-MCNC: 5.8 G/DL (ref 6.4–8.2)
PROT UR STRIP.AUTO-MCNC: ABNORMAL MG/DL
RBC # BLD AUTO: 4.46 X10*6/UL (ref 4–5.2)
RBC # UR STRIP.AUTO: ABNORMAL /UL
RBC #/AREA URNS AUTO: >20 /HPF
RBC MORPH BLD: ABNORMAL
SAO2 % BLDA: 98 % (ref 94–100)
SODIUM BLDA-SCNC: 132 MMOL/L (ref 136–145)
SODIUM SERPL-SCNC: 135 MMOL/L (ref 136–145)
SP GR UR STRIP.AUTO: 1.02
SPECIMEN DRAWN FROM PATIENT: ABNORMAL
SQUAMOUS #/AREA URNS AUTO: ABNORMAL /HPF
TOTAL CELLS COUNTED BLD: 100
TRI-PHOS CRY #/AREA UR COMP ASSIST: ABNORMAL /HPF
UROBILINOGEN UR STRIP.AUTO-MCNC: NORMAL MG/DL
WBC # BLD AUTO: 29.1 X10*3/UL (ref 4.4–11.3)
WBC #/AREA URNS AUTO: >50 /HPF
WBC CLUMPS #/AREA URNS AUTO: ABNORMAL /HPF

## 2024-11-30 PROCEDURE — 2060000001 HC INTERMEDIATE ICU ROOM DAILY

## 2024-11-30 PROCEDURE — 87640 STAPH A DNA AMP PROBE: CPT | Performed by: STUDENT IN AN ORGANIZED HEALTH CARE EDUCATION/TRAINING PROGRAM

## 2024-11-30 PROCEDURE — 94640 AIRWAY INHALATION TREATMENT: CPT

## 2024-11-30 PROCEDURE — 96367 TX/PROPH/DG ADDL SEQ IV INF: CPT

## 2024-11-30 PROCEDURE — 74176 CT ABD & PELVIS W/O CONTRAST: CPT | Performed by: RADIOLOGY

## 2024-11-30 PROCEDURE — 82248 BILIRUBIN DIRECT: CPT | Performed by: STUDENT IN AN ORGANIZED HEALTH CARE EDUCATION/TRAINING PROGRAM

## 2024-11-30 PROCEDURE — 84484 ASSAY OF TROPONIN QUANT: CPT | Performed by: STUDENT IN AN ORGANIZED HEALTH CARE EDUCATION/TRAINING PROGRAM

## 2024-11-30 PROCEDURE — 2500000004 HC RX 250 GENERAL PHARMACY W/ HCPCS (ALT 636 FOR OP/ED): Performed by: STUDENT IN AN ORGANIZED HEALTH CARE EDUCATION/TRAINING PROGRAM

## 2024-11-30 PROCEDURE — 36600 WITHDRAWAL OF ARTERIAL BLOOD: CPT

## 2024-11-30 PROCEDURE — 74176 CT ABD & PELVIS W/O CONTRAST: CPT

## 2024-11-30 PROCEDURE — 85027 COMPLETE CBC AUTOMATED: CPT | Performed by: STUDENT IN AN ORGANIZED HEALTH CARE EDUCATION/TRAINING PROGRAM

## 2024-11-30 PROCEDURE — 99285 EMERGENCY DEPT VISIT HI MDM: CPT | Mod: 25

## 2024-11-30 PROCEDURE — 36415 COLL VENOUS BLD VENIPUNCTURE: CPT | Performed by: STUDENT IN AN ORGANIZED HEALTH CARE EDUCATION/TRAINING PROGRAM

## 2024-11-30 PROCEDURE — 82550 ASSAY OF CK (CPK): CPT | Performed by: INTERNAL MEDICINE

## 2024-11-30 PROCEDURE — 85007 BL SMEAR W/DIFF WBC COUNT: CPT | Performed by: STUDENT IN AN ORGANIZED HEALTH CARE EDUCATION/TRAINING PROGRAM

## 2024-11-30 PROCEDURE — 93010 ELECTROCARDIOGRAM REPORT: CPT | Performed by: INTERNAL MEDICINE

## 2024-11-30 PROCEDURE — 81001 URINALYSIS AUTO W/SCOPE: CPT | Performed by: STUDENT IN AN ORGANIZED HEALTH CARE EDUCATION/TRAINING PROGRAM

## 2024-11-30 PROCEDURE — 2500000004 HC RX 250 GENERAL PHARMACY W/ HCPCS (ALT 636 FOR OP/ED): Performed by: INTERNAL MEDICINE

## 2024-11-30 PROCEDURE — 96375 TX/PRO/DX INJ NEW DRUG ADDON: CPT

## 2024-11-30 PROCEDURE — 2500000005 HC RX 250 GENERAL PHARMACY W/O HCPCS: Performed by: INTERNAL MEDICINE

## 2024-11-30 PROCEDURE — 93005 ELECTROCARDIOGRAM TRACING: CPT

## 2024-11-30 PROCEDURE — 84132 ASSAY OF SERUM POTASSIUM: CPT | Performed by: INTERNAL MEDICINE

## 2024-11-30 PROCEDURE — 9420000001 HC RT PATIENT EDUCATION 5 MIN

## 2024-11-30 PROCEDURE — 71045 X-RAY EXAM CHEST 1 VIEW: CPT

## 2024-11-30 PROCEDURE — 2500000002 HC RX 250 W HCPCS SELF ADMINISTERED DRUGS (ALT 637 FOR MEDICARE OP, ALT 636 FOR OP/ED): Performed by: INTERNAL MEDICINE

## 2024-11-30 PROCEDURE — 96365 THER/PROPH/DIAG IV INF INIT: CPT

## 2024-11-30 PROCEDURE — 83605 ASSAY OF LACTIC ACID: CPT | Performed by: STUDENT IN AN ORGANIZED HEALTH CARE EDUCATION/TRAINING PROGRAM

## 2024-11-30 PROCEDURE — 2500000001 HC RX 250 WO HCPCS SELF ADMINISTERED DRUGS (ALT 637 FOR MEDICARE OP): Performed by: INTERNAL MEDICINE

## 2024-11-30 PROCEDURE — 83605 ASSAY OF LACTIC ACID: CPT | Performed by: INTERNAL MEDICINE

## 2024-11-30 PROCEDURE — 80048 BASIC METABOLIC PNL TOTAL CA: CPT | Performed by: STUDENT IN AN ORGANIZED HEALTH CARE EDUCATION/TRAINING PROGRAM

## 2024-11-30 PROCEDURE — 83880 ASSAY OF NATRIURETIC PEPTIDE: CPT | Performed by: STUDENT IN AN ORGANIZED HEALTH CARE EDUCATION/TRAINING PROGRAM

## 2024-11-30 PROCEDURE — 71045 X-RAY EXAM CHEST 1 VIEW: CPT | Performed by: RADIOLOGY

## 2024-11-30 PROCEDURE — 87086 URINE CULTURE/COLONY COUNT: CPT | Mod: TRILAB,WESLAB | Performed by: STUDENT IN AN ORGANIZED HEALTH CARE EDUCATION/TRAINING PROGRAM

## 2024-11-30 PROCEDURE — 87641 MR-STAPH DNA AMP PROBE: CPT | Performed by: STUDENT IN AN ORGANIZED HEALTH CARE EDUCATION/TRAINING PROGRAM

## 2024-11-30 PROCEDURE — 87040 BLOOD CULTURE FOR BACTERIA: CPT | Mod: TRILAB | Performed by: STUDENT IN AN ORGANIZED HEALTH CARE EDUCATION/TRAINING PROGRAM

## 2024-11-30 RX ORDER — VIT C/E/ZN/COPPR/LUTEIN/ZEAXAN 250MG-90MG
500 CAPSULE ORAL DAILY
Status: DISCONTINUED | OUTPATIENT
Start: 2024-11-30 | End: 2024-12-06 | Stop reason: HOSPADM

## 2024-11-30 RX ORDER — ACETAMINOPHEN 160 MG/5ML
650 SOLUTION ORAL EVERY 4 HOURS PRN
Status: DISCONTINUED | OUTPATIENT
Start: 2024-11-30 | End: 2024-12-06 | Stop reason: HOSPADM

## 2024-11-30 RX ORDER — SENNOSIDES 8.6 MG/1
2 TABLET ORAL 2 TIMES DAILY
Status: DISCONTINUED | OUTPATIENT
Start: 2024-11-30 | End: 2024-12-06 | Stop reason: HOSPADM

## 2024-11-30 RX ORDER — NAPROXEN SODIUM 220 MG/1
81 TABLET, FILM COATED ORAL DAILY
Status: DISCONTINUED | OUTPATIENT
Start: 2024-11-30 | End: 2024-12-06 | Stop reason: HOSPADM

## 2024-11-30 RX ORDER — AMMONIUM LACTATE 12 G/100G
1 LOTION TOPICAL 2 TIMES DAILY
Status: DISCONTINUED | OUTPATIENT
Start: 2024-11-30 | End: 2024-12-06 | Stop reason: HOSPADM

## 2024-11-30 RX ORDER — ACETAMINOPHEN 325 MG/1
650 TABLET ORAL EVERY 4 HOURS PRN
Status: DISCONTINUED | OUTPATIENT
Start: 2024-11-30 | End: 2024-12-06 | Stop reason: HOSPADM

## 2024-11-30 RX ORDER — GUAIFENESIN/DEXTROMETHORPHAN 100-10MG/5
5 SYRUP ORAL EVERY 4 HOURS PRN
Status: DISCONTINUED | OUTPATIENT
Start: 2024-11-30 | End: 2024-12-06 | Stop reason: HOSPADM

## 2024-11-30 RX ORDER — SODIUM CHLORIDE 9 MG/ML
100 INJECTION, SOLUTION INTRAVENOUS CONTINUOUS
Status: DISCONTINUED | OUTPATIENT
Start: 2024-11-30 | End: 2024-11-30

## 2024-11-30 RX ORDER — MELOXICAM 7.5 MG/1
7.5 TABLET ORAL DAILY
Status: DISCONTINUED | OUTPATIENT
Start: 2024-11-30 | End: 2024-11-30

## 2024-11-30 RX ORDER — ALBUTEROL SULFATE 0.83 MG/ML
2.5 SOLUTION RESPIRATORY (INHALATION) EVERY 2 HOUR PRN
Status: DISCONTINUED | OUTPATIENT
Start: 2024-11-30 | End: 2024-12-06 | Stop reason: HOSPADM

## 2024-11-30 RX ORDER — PANTOPRAZOLE SODIUM 40 MG/1
40 TABLET, DELAYED RELEASE ORAL
Status: DISCONTINUED | OUTPATIENT
Start: 2024-12-01 | End: 2024-12-06 | Stop reason: HOSPADM

## 2024-11-30 RX ORDER — ADHESIVE BANDAGE
30 BANDAGE TOPICAL DAILY PRN
Status: DISCONTINUED | OUTPATIENT
Start: 2024-11-30 | End: 2024-12-01

## 2024-11-30 RX ORDER — LOPERAMIDE HYDROCHLORIDE 2 MG/1
2 CAPSULE ORAL 4 TIMES DAILY PRN
Status: DISCONTINUED | OUTPATIENT
Start: 2024-11-30 | End: 2024-12-06 | Stop reason: HOSPADM

## 2024-11-30 RX ORDER — ALBUTEROL SULFATE 90 UG/1
2 INHALANT RESPIRATORY (INHALATION) EVERY 2 HOUR PRN
Status: DISCONTINUED | OUTPATIENT
Start: 2024-11-30 | End: 2024-11-30

## 2024-11-30 RX ORDER — TOPIRAMATE 25 MG/1
50 TABLET ORAL DAILY
Status: DISCONTINUED | OUTPATIENT
Start: 2024-11-30 | End: 2024-12-06 | Stop reason: HOSPADM

## 2024-11-30 RX ORDER — ACETAMINOPHEN 325 MG/1
650 TABLET ORAL EVERY 6 HOURS PRN
Status: DISCONTINUED | OUTPATIENT
Start: 2024-11-30 | End: 2024-11-30

## 2024-11-30 RX ORDER — CLOPIDOGREL BISULFATE 75 MG/1
75 TABLET ORAL DAILY
Status: DISCONTINUED | OUTPATIENT
Start: 2024-11-30 | End: 2024-12-06 | Stop reason: HOSPADM

## 2024-11-30 RX ORDER — CETIRIZINE HYDROCHLORIDE 10 MG/1
10 TABLET ORAL DAILY
Status: DISCONTINUED | OUTPATIENT
Start: 2024-11-30 | End: 2024-12-06 | Stop reason: HOSPADM

## 2024-11-30 RX ORDER — VANCOMYCIN HYDROCHLORIDE 1 G/20ML
INJECTION, POWDER, LYOPHILIZED, FOR SOLUTION INTRAVENOUS DAILY PRN
Status: DISCONTINUED | OUTPATIENT
Start: 2024-11-30 | End: 2024-12-03

## 2024-11-30 RX ORDER — POLYETHYLENE GLYCOL 3350 17 G/17G
17 POWDER, FOR SOLUTION ORAL DAILY PRN
Status: DISCONTINUED | OUTPATIENT
Start: 2024-11-30 | End: 2024-12-06 | Stop reason: HOSPADM

## 2024-11-30 RX ORDER — POLYETHYLENE GLYCOL 3350 17 G/17G
17 POWDER, FOR SOLUTION ORAL DAILY PRN
Status: DISCONTINUED | OUTPATIENT
Start: 2024-11-30 | End: 2024-11-30

## 2024-11-30 RX ORDER — ONDANSETRON 4 MG/1
4 TABLET, ORALLY DISINTEGRATING ORAL EVERY 8 HOURS PRN
Status: DISCONTINUED | OUTPATIENT
Start: 2024-11-30 | End: 2024-12-06 | Stop reason: HOSPADM

## 2024-11-30 RX ORDER — MENTHOL AND ZINC OXIDE .44; 20.625 G/100G; G/100G
OINTMENT TOPICAL 4 TIMES DAILY PRN
Status: DISCONTINUED | OUTPATIENT
Start: 2024-11-30 | End: 2024-12-06 | Stop reason: HOSPADM

## 2024-11-30 RX ORDER — NYSTATIN 100000 [USP'U]/G
1 POWDER TOPICAL 2 TIMES DAILY
Status: DISCONTINUED | OUTPATIENT
Start: 2024-11-30 | End: 2024-12-06 | Stop reason: HOSPADM

## 2024-11-30 RX ORDER — IPRATROPIUM BROMIDE AND ALBUTEROL SULFATE 2.5; .5 MG/3ML; MG/3ML
3 SOLUTION RESPIRATORY (INHALATION)
Status: DISCONTINUED | OUTPATIENT
Start: 2024-11-30 | End: 2024-11-30

## 2024-11-30 RX ORDER — MEROPENEM AND SODIUM CHLORIDE 1 G/50ML
1 INJECTION, SOLUTION INTRAVENOUS EVERY 12 HOURS
Status: DISCONTINUED | OUTPATIENT
Start: 2024-11-30 | End: 2024-12-02

## 2024-11-30 RX ORDER — IPRATROPIUM BROMIDE AND ALBUTEROL SULFATE 2.5; .5 MG/3ML; MG/3ML
3 SOLUTION RESPIRATORY (INHALATION)
Status: DISCONTINUED | OUTPATIENT
Start: 2024-11-30 | End: 2024-12-06 | Stop reason: HOSPADM

## 2024-11-30 RX ORDER — ACETAMINOPHEN 650 MG/1
650 SUPPOSITORY RECTAL EVERY 4 HOURS PRN
Status: DISCONTINUED | OUTPATIENT
Start: 2024-11-30 | End: 2024-12-06 | Stop reason: HOSPADM

## 2024-11-30 RX ORDER — ATORVASTATIN CALCIUM 10 MG/1
10 TABLET, FILM COATED ORAL NIGHTLY
Status: DISCONTINUED | OUTPATIENT
Start: 2024-11-30 | End: 2024-12-06 | Stop reason: HOSPADM

## 2024-11-30 RX ORDER — GUAIFENESIN 600 MG/1
600 TABLET, EXTENDED RELEASE ORAL EVERY 12 HOURS PRN
Status: DISCONTINUED | OUTPATIENT
Start: 2024-11-30 | End: 2024-12-06 | Stop reason: HOSPADM

## 2024-11-30 RX ORDER — VANCOMYCIN 2 G/400ML
2 INJECTION, SOLUTION INTRAVENOUS ONCE
Status: COMPLETED | OUTPATIENT
Start: 2024-11-30 | End: 2024-11-30

## 2024-11-30 RX ORDER — HEPARIN SODIUM 5000 [USP'U]/ML
7500 INJECTION, SOLUTION INTRAVENOUS; SUBCUTANEOUS EVERY 8 HOURS SCHEDULED
Status: DISCONTINUED | OUTPATIENT
Start: 2024-11-30 | End: 2024-12-03

## 2024-11-30 SDOH — SOCIAL STABILITY: SOCIAL INSECURITY: HAS ANYONE EVER THREATENED TO HURT YOUR FAMILY OR YOUR PETS?: UNABLE TO ASSESS

## 2024-11-30 SDOH — ECONOMIC STABILITY: FOOD INSECURITY
WITHIN THE PAST 12 MONTHS, THE FOOD YOU BOUGHT JUST DIDN'T LAST AND YOU DIDN'T HAVE MONEY TO GET MORE.: PATIENT UNABLE TO ANSWER

## 2024-11-30 SDOH — SOCIAL STABILITY: SOCIAL INSECURITY
WITHIN THE LAST YEAR, HAVE YOU BEEN HUMILIATED OR EMOTIONALLY ABUSED IN OTHER WAYS BY YOUR PARTNER OR EX-PARTNER?: PATIENT UNABLE TO ANSWER

## 2024-11-30 SDOH — HEALTH STABILITY: MENTAL HEALTH: HOW OFTEN DO YOU HAVE A DRINK CONTAINING ALCOHOL?: PATIENT UNABLE TO ANSWER

## 2024-11-30 SDOH — SOCIAL STABILITY: SOCIAL INSECURITY: DO YOU FEEL UNSAFE GOING BACK TO THE PLACE WHERE YOU ARE LIVING?: UNABLE TO ASSESS

## 2024-11-30 SDOH — SOCIAL STABILITY: SOCIAL INSECURITY
WITHIN THE LAST YEAR, HAVE YOU BEEN RAPED OR FORCED TO HAVE ANY KIND OF SEXUAL ACTIVITY BY YOUR PARTNER OR EX-PARTNER?: PATIENT UNABLE TO ANSWER

## 2024-11-30 SDOH — SOCIAL STABILITY: SOCIAL INSECURITY
WITHIN THE LAST YEAR, HAVE YOU BEEN KICKED, HIT, SLAPPED, OR OTHERWISE PHYSICALLY HURT BY YOUR PARTNER OR EX-PARTNER?: PATIENT UNABLE TO ANSWER

## 2024-11-30 SDOH — SOCIAL STABILITY: SOCIAL INSECURITY: HAVE YOU HAD ANY THOUGHTS OF HARMING ANYONE ELSE?: UNABLE TO ASSESS

## 2024-11-30 SDOH — SOCIAL STABILITY: SOCIAL INSECURITY: ARE THERE ANY APPARENT SIGNS OF INJURIES/BEHAVIORS THAT COULD BE RELATED TO ABUSE/NEGLECT?: UNABLE TO ASSESS

## 2024-11-30 SDOH — SOCIAL STABILITY: SOCIAL INSECURITY: ABUSE: ADULT

## 2024-11-30 SDOH — SOCIAL STABILITY: SOCIAL INSECURITY: WITHIN THE LAST YEAR, HAVE YOU BEEN AFRAID OF YOUR PARTNER OR EX-PARTNER?: PATIENT UNABLE TO ANSWER

## 2024-11-30 SDOH — SOCIAL STABILITY: SOCIAL INSECURITY: WERE YOU ABLE TO COMPLETE ALL THE BEHAVIORAL HEALTH SCREENINGS?: NO

## 2024-11-30 SDOH — ECONOMIC STABILITY: HOUSING INSECURITY: AT ANY TIME IN THE PAST 12 MONTHS, WERE YOU HOMELESS OR LIVING IN A SHELTER (INCLUDING NOW)?: PATIENT UNABLE TO ANSWER

## 2024-11-30 SDOH — ECONOMIC STABILITY: FOOD INSECURITY
HOW HARD IS IT FOR YOU TO PAY FOR THE VERY BASICS LIKE FOOD, HOUSING, MEDICAL CARE, AND HEATING?: PATIENT UNABLE TO ANSWER

## 2024-11-30 SDOH — SOCIAL STABILITY: SOCIAL INSECURITY: DO YOU FEEL ANYONE HAS EXPLOITED OR TAKEN ADVANTAGE OF YOU FINANCIALLY OR OF YOUR PERSONAL PROPERTY?: UNABLE TO ASSESS

## 2024-11-30 SDOH — HEALTH STABILITY: MENTAL HEALTH: HOW MANY DRINKS CONTAINING ALCOHOL DO YOU HAVE ON A TYPICAL DAY WHEN YOU ARE DRINKING?: PATIENT UNABLE TO ANSWER

## 2024-11-30 SDOH — ECONOMIC STABILITY: INCOME INSECURITY
IN THE PAST 12 MONTHS HAS THE ELECTRIC, GAS, OIL, OR WATER COMPANY THREATENED TO SHUT OFF SERVICES IN YOUR HOME?: PATIENT UNABLE TO ANSWER

## 2024-11-30 SDOH — ECONOMIC STABILITY: FOOD INSECURITY
WITHIN THE PAST 12 MONTHS, YOU WORRIED THAT YOUR FOOD WOULD RUN OUT BEFORE YOU GOT THE MONEY TO BUY MORE.: PATIENT UNABLE TO ANSWER

## 2024-11-30 SDOH — SOCIAL STABILITY: SOCIAL INSECURITY: DOES ANYONE TRY TO KEEP YOU FROM HAVING/CONTACTING OTHER FRIENDS OR DOING THINGS OUTSIDE YOUR HOME?: UNABLE TO ASSESS

## 2024-11-30 SDOH — SOCIAL STABILITY: SOCIAL INSECURITY: ARE YOU OR HAVE YOU BEEN THREATENED OR ABUSED PHYSICALLY, EMOTIONALLY, OR SEXUALLY BY ANYONE?: UNABLE TO ASSESS

## 2024-11-30 SDOH — ECONOMIC STABILITY: HOUSING INSECURITY
IN THE LAST 12 MONTHS, WAS THERE A TIME WHEN YOU WERE NOT ABLE TO PAY THE MORTGAGE OR RENT ON TIME?: PATIENT UNABLE TO ANSWER

## 2024-11-30 SDOH — HEALTH STABILITY: MENTAL HEALTH: HOW OFTEN DO YOU HAVE SIX OR MORE DRINKS ON ONE OCCASION?: PATIENT UNABLE TO ANSWER

## 2024-11-30 ASSESSMENT — COGNITIVE AND FUNCTIONAL STATUS - GENERAL
EATING MEALS: A LOT
PERSONAL GROOMING: A LOT
PATIENT BASELINE BEDBOUND: YES
HELP NEEDED FOR BATHING: TOTAL
TOILETING: TOTAL
DRESSING REGULAR LOWER BODY CLOTHING: TOTAL
DRESSING REGULAR UPPER BODY CLOTHING: TOTAL
DAILY ACTIVITIY SCORE: 8

## 2024-11-30 ASSESSMENT — LIFESTYLE VARIABLES
SKIP TO QUESTIONS 9-10: 0
AUDIT-C TOTAL SCORE: -1

## 2024-11-30 ASSESSMENT — ACTIVITIES OF DAILY LIVING (ADL)
BATHING: DEPENDENT
GROOMING: DEPENDENT
DRESSING YOURSELF: DEPENDENT
HEARING - RIGHT EAR: FUNCTIONAL
WALKS IN HOME: DEPENDENT
ADEQUATE_TO_COMPLETE_ADL: YES
PATIENT'S MEMORY ADEQUATE TO SAFELY COMPLETE DAILY ACTIVITIES?: YES
JUDGMENT_ADEQUATE_SAFELY_COMPLETE_DAILY_ACTIVITIES: YES
LACK_OF_TRANSPORTATION: PATIENT UNABLE TO ANSWER
HEARING - LEFT EAR: FUNCTIONAL
FEEDING YOURSELF: UNABLE TO ASSESS
ASSISTIVE_DEVICE: WALKER
TOILETING: DEPENDENT

## 2024-11-30 ASSESSMENT — COLUMBIA-SUICIDE SEVERITY RATING SCALE - C-SSRS
6. HAVE YOU EVER DONE ANYTHING, STARTED TO DO ANYTHING, OR PREPARED TO DO ANYTHING TO END YOUR LIFE?: NO
1. IN THE PAST MONTH, HAVE YOU WISHED YOU WERE DEAD OR WISHED YOU COULD GO TO SLEEP AND NOT WAKE UP?: NO
2. HAVE YOU ACTUALLY HAD ANY THOUGHTS OF KILLING YOURSELF?: NO

## 2024-11-30 ASSESSMENT — PAIN - FUNCTIONAL ASSESSMENT
PAIN_FUNCTIONAL_ASSESSMENT: 0-10
PAIN_FUNCTIONAL_ASSESSMENT: CPOT (CRITICAL CARE PAIN OBSERVATION TOOL)

## 2024-11-30 ASSESSMENT — PAIN SCALES - GENERAL
PAINLEVEL_OUTOF10: 0 - NO PAIN
PAINLEVEL_OUTOF10: 0 - NO PAIN

## 2024-11-30 NOTE — ED PROVIDER NOTES
HPI   Chief Complaint   Patient presents with    Shortness of Breath       Patient presents from [facility] for shortness of breath.  When asked how long she has been short of breath, she states forever.  She is unable to state if she normally uses oxygen.  Per EMS report, patient was 97% on 2 L by nasal cannula.  Her nursing facility was reportedly concerned about crackling/wet breath sounds.              Patient History   Past Medical History:   Diagnosis Date    Chronic bronchiolitis (Multi)     Hyperlipidemia     Nicotine dependence     Stroke (Multi)     Vitamin B12 deficiency      Past Surgical History:   Procedure Laterality Date    ADENOIDECTOMY      MR HEAD ANGIO WO IV CONTRAST  11/03/2020    MR HEAD ANGIO WO IV CONTRAST [facility]    MR HEAD ANGIO WO IV CONTRAST  03/29/2021    MR HEAD ANGIO WO IV CONTRAST [facility]    MR NECK ANGIO WO IV CONTRAST  03/30/2021    MR NECK ANGIO WO IV CONTRAST [facility]    TONSILLECTOMY       Family History   Problem Relation Name Age of Onset    Other (CP) Daughter      Alcohol abuse Son       Social History     Tobacco Use    Smoking status: Every Day     Current packs/day: 0.50     Types: Cigarettes    Smokeless tobacco: Never   Substance Use Topics    Alcohol use: Defer    Drug use: Never       Physical Exam   ED Triage Vitals   Temp Pulse Resp BP   -- -- -- --      SpO2 Temp src Heart Rate Source Patient Position   -- -- -- --      BP Location FiO2 (%)     -- --       Physical Exam  HENT:      Head: Normocephalic.   Cardiovascular:      Rate and Rhythm: Normal rate.   Pulmonary:      Comments: Frequent cough.  No adventitious lung sounds able to be auscultated, limited by elevated BMI.  Musculoskeletal:      Comments: Trace pretibial edema bilateral lower extremities   Neurological:      Mental Status: She is alert.      Comments: Patient awake and alert.  Has difficulty answering some questions.           ED Course & MDM   ED  Course as of 11/30/24 1121   Sat Nov 30, 2024   0706 EKG interpreted by me: Normal sinus rhythm, rate 95.  Normal axis.  Diffuse approximately 1 mm ST depression.  No previous EKG available for comparison. [ML]      ED Course User Index  [ML] Chuy Hall MD         Diagnoses as of 11/30/24 1121   Shortness of breath   Leukocytosis, unspecified type   Diverticulitis                 No data recorded     [place] Coma Scale Score: 14 (11/30/24 0741 : Nicki Valencia RN)                           Medical Decision Making  Patient presents with heart rate of 96, elevated respirations.  When I obtained initial blood work, white blood cell count is significantly elevated.  With suspicion for sepsis, broad-spectrum antibiotics were ordered.  I did not order any IV fluids as patient has history of heart failure and I suspect she may already be some degree of fluid overloaded.  Her blood pressure is elevated as well.    I performed a sepsis reperfusion exam on Amrita Lopez on 11/30/2024 11:20 AM    A targeted fluid bolus of 500 ml was given.  Initially I was concerned about fluid overload, however given her acute renal injury and elevated BUN, I suspect that he may have some degree of intravascular fluid depletion.  Continues to be mildly confused but vital signs have improved at this time.    Laboratory studies are otherwise significant for somewhat decreased bicarbonate level, consistent with metabolic acidosis.  CAT scan reveals likely diverticulitis with some degree of ileus but no signs of bowel obstruction.  Patient then accepted to primary care physicians service for further management.  Parts of this chart were completed with dictation software, please excuse any errors in transcription.          Procedure  Procedures     Chuy Hall MD  11/30/24 8354

## 2024-11-30 NOTE — CARE PLAN
The patient's goals for the shift include      The clinical goals for the shift include monitor O2

## 2024-11-30 NOTE — H&P
History Of Present Illness  Amrita Lopez is a 84 y.o. female presenting with  Date of shortness of breath.  At her baseline she saturating 94-98%  on room air.  With the patient was patient started desaturating and she was saturating 91 person.  Patient was started on 2 L oxygen and her  oxygenation improved to 97%.   The patient was on hospice service still full code.  The hospice services discussed with the son decided to send the patient to the emergency room.  In the emergency room initial cut down the patient was found to have acute on chronic respiratory failure.  The patient was admitted for further management.  The patient was not able to provide much history.  Moves softer history was taken from the old record, ER record and nursing home record.  No fever chills or rigors no diarrhea, dysuria, hematuria frequency.  No hematemesis, hematochezia or melena.  No odynophagia, dysphagia recent change in weight appetite.       Past Medical History  Past Medical History:   Diagnosis Date    Chronic bronchiolitis (Multi)     Hyperlipidemia     Nicotine dependence     Stroke (Multi)     Vitamin B12 deficiency        Surgical History  Past Surgical History:   Procedure Laterality Date    ADENOIDECTOMY      MR HEAD ANGIO WO IV CONTRAST  11/03/2020    MR HEAD ANGIO WO IV CONTRAST [facility]    MR HEAD ANGIO WO IV CONTRAST  03/29/2021    MR HEAD ANGIO WO IV CONTRAST [facility]    MR NECK ANGIO WO IV CONTRAST  03/30/2021    MR NECK ANGIO WO IV CONTRAST [facility]    TONSILLECTOMY          Social History  She reports that she has been smoking cigarettes. She has never used smokeless tobacco. Alcohol use questions deferred to the physician. She reports that she does not use drugs.    Family History  Family History   Problem Relation Name Age of Onset    Other (CP) Daughter      Alcohol abuse Son          Allergies  Nickel    Review of Systems  I reviewed all systems reviewed as above  "otherwise is negative.  Physical Exam   General examination  The patient was morbidly obese, lying in the bed.  She was having some shortness of breath.  Denied any headache or dizziness.  She was complaining of nausea and vomited x1.  HEENT:  Head externally atraumatic, no pallor, no icterus, extraocular movements intact, pupils reactive to light, oral mucosa moist and throat clear.  Neck:  Supple, no JVP, no palpable adenopathy or thyromegaly.  No carotid bruit.  Chest:  Clear to auscultation and resonant.  Heart:  Regular rate and rhythm, no murmur or gallop could be appreciated.  Abdomen:  Soft, nontender, bowel sounds present, normoactive, no palpable hepatosplenomegaly.  Extremities:  No edema, pulses present, no cyanosis or clubbing.  CNS:  Patient alert, oriented to time, place and person.  Power 5/5 all over and deep tendon reflexes symmetrical, cranial nerves 2-12 grossly intact.  Skin:  No active rash.  Musculoskeletal:  No joint swelling or erythema, range of movement normal.  Last Recorded Vitals  Heart Rate:  [73-96]   Temp:  [36 °C (96.8 °F)-36.2 °C (97.2 °F)]   Resp:  [15-25]   BP: (129-158)/(67-94)   Height:  [162.6 cm (5' 4\")]   Weight:  [136 kg (300 lb)]   SpO2:  [94 %-98 %]       Relevant Results        Results for orders placed or performed during the hospital encounter of 11/30/24 (from the past 24 hours)   CBC and Auto Differential   Result Value Ref Range    WBC 29.1 (H) 4.4 - 11.3 x10*3/uL    nRBC 0.0 0.0 - 0.0 /100 WBCs    RBC 4.46 4.00 - 5.20 x10*6/uL    Hemoglobin 12.4 12.0 - 16.0 g/dL    Hematocrit 39.5 36.0 - 46.0 %    MCV 89 80 - 100 fL    MCH 27.8 26.0 - 34.0 pg    MCHC 31.4 (L) 32.0 - 36.0 g/dL    RDW 16.5 (H) 11.5 - 14.5 %    Platelets 448 150 - 450 x10*3/uL    Immature Granulocytes %, Automated 2.3 (H) 0.0 - 0.9 %    Immature Granulocytes Absolute, Automated 0.66 (H) 0.00 - 0.50 x10*3/uL   Basic Metabolic Panel   Result Value Ref Range    Glucose 217 (H) 74 - 99 mg/dL    Sodium 135 " (L) 136 - 145 mmol/L    Potassium 3.7 3.5 - 5.3 mmol/L    Chloride 103 98 - 107 mmol/L    Bicarbonate 16 (L) 21 - 32 mmol/L    Anion Gap 20 10 - 20 mmol/L    Urea Nitrogen 94 (HH) 6 - 23 mg/dL    Creatinine 1.98 (H) 0.50 - 1.05 mg/dL    eGFR 25 (L) >60 mL/min/1.73m*2    Calcium 8.9 8.6 - 10.3 mg/dL   B-Type Natriuretic Peptide   Result Value Ref Range     (H) 0 - 99 pg/mL   Troponin I, High Sensitivity, Initial   Result Value Ref Range    Troponin I, High Sensitivity 17 (H) 0 - 13 ng/L   Manual Differential   Result Value Ref Range    Neutrophils %, Manual 29.0 40.0 - 80.0 %    Bands %, Manual 63.0 0.0 - 5.0 %    Lymphocytes %, Manual 5.0 13.0 - 44.0 %    Monocytes %, Manual 1.0 2.0 - 10.0 %    Eosinophils %, Manual 0.0 0.0 - 6.0 %    Basophils %, Manual 0.0 0.0 - 2.0 %    Metamyelocytes %, Manual 2.0 0.0 - 0.0 %    Seg Neutrophils Absolute, Manual 8.44 (H) 1.60 - 5.00 x10*3/uL    Bands Absolute, Manual 18.33 (H) 0.00 - 0.50 x10*3/uL    Lymphocytes Absolute, Manual 1.46 0.80 - 3.00 x10*3/uL    Monocytes Absolute, Manual 0.29 0.05 - 0.80 x10*3/uL    Eosinophils Absolute, Manual 0.00 0.00 - 0.40 x10*3/uL    Basophils Absolute, Manual 0.00 0.00 - 0.10 x10*3/uL    Metamyelocytes Absolute, Manual 0.58 0.00 - 0.00 x10*3/uL    Total Cells Counted 100     Neutrophils Absolute, Manual 26.77 (H) 1.60 - 5.50 x10*3/uL    RBC Morphology See Below     Markus Cells Few    Hepatic function panel   Result Value Ref Range    Albumin 2.5 (L) 3.4 - 5.0 g/dL    Bilirubin, Total 0.5 0.0 - 1.2 mg/dL    Bilirubin, Direct 0.1 0.0 - 0.3 mg/dL    Alkaline Phosphatase 91 33 - 136 U/L    ALT 11 7 - 45 U/L    AST 10 9 - 39 U/L    Total Protein 5.8 (L) 6.4 - 8.2 g/dL   Lactate   Result Value Ref Range    Lactate 2.4 (H) 0.4 - 2.0 mmol/L   MRSA Surveillance for Vancomycin De-escalation, PCR    Specimen: Anterior Nares; Swab   Result Value Ref Range    MRSA PCR Detected (A) Not Detected   Urinalysis with Reflex Culture and Microscopic   Result  Value Ref Range    Color, Urine Orange (N) Light-Yellow, Yellow, Dark-Yellow    Appearance, Urine Ex.Turbid (N) Clear    Specific Gravity, Urine 1.022 1.005 - 1.035    pH, Urine 8.0 5.0, 5.5, 6.0, 6.5, 7.0, 7.5, 8.0    Protein, Urine 50 (1+) (A) NEGATIVE, 10 (TRACE), 20 (TRACE) mg/dL    Glucose, Urine Normal Normal mg/dL    Blood, Urine 0.2 (2+) (A) NEGATIVE    Ketones, Urine NEGATIVE NEGATIVE mg/dL    Bilirubin, Urine NEGATIVE NEGATIVE    Urobilinogen, Urine Normal Normal mg/dL    Nitrite, Urine NEGATIVE NEGATIVE    Leukocyte Esterase, Urine NEGATIVE NEGATIVE   Urinalysis Microscopic   Result Value Ref Range    WBC, Urine >50 (A) 1-5, NONE /HPF    WBC Clumps, Urine MODERATE Reference range not established. /HPF    RBC, Urine >20 (A) NONE, 1-2, 3-5 /HPF    Squamous Epithelial Cells, Urine 1-9 (SPARSE) Reference range not established. /HPF    Bacteria, Urine 2+ (A) NONE SEEN /HPF    Mucus, Urine FEW Reference range not established. /LPF    Hyaline Casts, Urine 1+ (A) NONE /LPF    Triple Phosphate Crystals, Urine 1+ NONE, 1+ /HPF   Troponin, High Sensitivity, 1 Hour   Result Value Ref Range    Troponin I, High Sensitivity 20 (H) 0 - 13 ng/L   Lactate   Result Value Ref Range    Lactate 2.0 0.4 - 2.0 mmol/L     Prior to Admission medications    Medication Sig Start Date End Date Taking? Authorizing Provider   acetaminophen (Tylenol) 325 mg tablet Take 2 tablets (650 mg) by mouth every 6 hours if needed for mild pain (1 - 3), headaches or fever (temp greater than 38.0 C).    Historical Provider, MD   albuterol 108 (90 Base) MCG/ACT inhaler Inhale 2 puffs every 2 hours if needed for wheezing or shortness of breath. 3/21/23   Historical Provider, MD   ammonium lactate (Lac-Hydrin) 12 % lotion Apply 1 Application topically 2 times a day. 5/4/23   Historical Provider, MD   aspirin 81 mg EC tablet Chew 1 tablet (81 mg) once daily.    Historical Provider, MD   atorvastatin (Lipitor) 10 mg tablet Take 1 tablet (10 mg) by mouth  once daily at bedtime. 5/24/24   DARLENE Elizalde   cholecalciferol (Vitamin D-3) 5,000 Units tablet Take 1 tablet (5,000 Units) by mouth once daily.    Historical Provider, MD   clopidogrel (Plavix) 75 mg tablet Take 1 tablet (75 mg) by mouth once daily.    Historical Provider, MD   cyanocobalamin (Vitamin B-12) 1,000 mcg tablet Take 0.5 tablets (500 mcg) by mouth once daily.    Historical Provider, MD   furosemide (Lasix) 40 mg tablet Take 1 tablet (40 mg) by mouth once daily. 3/8/23   Historical Provider, MD   inhalational spacing device inhaler Use as directed with inhalers 4/17/24 4/17/25  DARLENE Donovan   ipratropium-albuteroL (Duo-Neb) 0.5-2.5 mg/3 mL nebulizer solution Take 3 mL by nebulization every 6 hours. 10/28/23   Arnoldo Sanchez MD   loperamide (Imodium A-D) 2 mg tablet Take 1 tablet (2 mg) by mouth 4 times a day as needed for diarrhea. Take 2 tablets (4mg) po with first loose stool, and 1 tablet (2mg) with each additional loose stool. Not to exceed 16mg/day. 2/10/24   DARLENE Donovan   loratadine (Claritin) 10 mg tablet Take 1 tablet (10 mg) by mouth once daily.    Historical Provider, MD   magnesium hydroxide (Milk of Magnesia) 2,400 mg/10 mL suspension suspension Take 30 mL by mouth once daily as needed for constipation.    Historical Provider, MD   meloxicam (Mobic) 7.5 mg tablet Take 1 tablet (7.5 mg) by mouth once daily. Administer with food to reduce GI upset. 5/24/24   DARLENE Elizalde   menthol-zinc oxide (Zinc-Oxyde Plus) 0.44-20 % ointment Apply 1 Application topically if needed. After incontinence    Historical Provider, MD   nystatin (Mycostatin) 100,000 unit/gram powder Apply 1 Application topically 2 times a day. Apply to groin. 5/24/24   Tiffany A Vanik, APRN-CNP   omeprazole OTC (PriLOSEC OTC) 20 mg EC tablet Take 1 tablet (20 mg) by mouth once daily in the morning. Take before meals. Do not crush, chew, or split.    Historical  "Provider, MD   ondansetron ODT (Zofran-ODT) 4 mg disintegrating tablet Take 1 tablet (4 mg) by mouth every 8 hours if needed for nausea or vomiting. 10/28/23   Arnoldo Sanchez MD   polyethylene glycol (Glycolax, Miralax) 17 gram packet Take 17 g by mouth once daily as needed (constipation). 10/28/23   Arnoldo Sanchez MD   sennosides (Senokot) 8.6 mg tablet Take 2 tablets (17.2 mg) by mouth 2 times a day. 10/28/23   Arnoldo Sanchez MD   topiramate (Topamax) 50 mg tablet Take 1 tablet (50 mg) by mouth once daily. 5/16/24   DARLENE Donovan   wound dressings (Triad Wound Dressing) paste Apply 1 Application topically once daily. Apply to affected areas (buttocks, posterior thighs) once daily, do not scrub off prior application when reapplying. Apply 1/16\" thick. 5/14/24   DARLENE Donovan       Current Facility-Administered Medications:     acetaminophen (Tylenol) tablet 650 mg, 650 mg, oral, q4h PRN **OR** acetaminophen (Tylenol) oral liquid 650 mg, 650 mg, oral, q4h PRN **OR** acetaminophen (Tylenol) suppository 650 mg, 650 mg, rectal, q4h PRN, Arnoldo Sanchez MD    albuterol 2.5 mg /3 mL (0.083 %) nebulizer solution 2.5 mg, 2.5 mg, nebulization, q2h PRN, Arnoldo Sanchez MD    ammonium lactate (Lac-Hydrin) 12 % lotion 1 Application, 1 Application, Topical, BID, Arnoldo Sanchez MD    aspirin chewable tablet 81 mg, 81 mg, oral, Daily, Arnoldo Sanchez MD    atorvastatin (Lipitor) tablet 10 mg, 10 mg, oral, Nightly, Arnoldo Sanchez MD    benzocaine-menthol (Cepastat Sore Throat) lozenge 1 lozenge, 1 lozenge, Mouth/Throat, q2h PRN, Arnoldo Sanchez MD    cetirizine (ZyrTEC) tablet 10 mg, 10 mg, oral, Daily, Arnoldo Sanchez MD    cholecalciferol (Vitamin D-3) tablet 5,000 Units, 5,000 Units, oral, Daily, Arnoldo Sanchez MD    clopidogrel (Plavix) tablet 75 mg, 75 mg, oral, Daily, Arnoldo Sanchez MD    cyanocobalamin (Vitamin B-12) tablet 500 mcg, 500 " mcg, oral, Daily, Arnoldo Sanchez MD    dextromethorphan-guaifenesin (Robitussin DM)  mg/5 mL oral liquid 5 mL, 5 mL, oral, q4h PRN, Arnoldo Sanchez MD    guaiFENesin (Mucinex) 12 hr tablet 600 mg, 600 mg, oral, q12h PRN, Arnoldo Sanchez MD    heparin (porcine) injection 7,500 Units, 7,500 Units, subcutaneous, q8h ANNIE, Arnoldo Sanchez MD    ipratropium-albuteroL (Duo-Neb) 0.5-2.5 mg/3 mL nebulizer solution 3 mL, 3 mL, nebulization, q6h, Arnoldo Sanchez MD    loperamide (Imodium) capsule 2 mg, 2 mg, oral, 4x daily PRN, Arnoldo Sanchez MD    magnesium hydroxide (Milk of Magnesia) 400 mg/5 mL suspension 30 mL, 30 mL, oral, Daily PRN, Arnoldo Sanchez MD    meloxicam (Mobic) tablet 7.5 mg, 7.5 mg, oral, Daily, Arnoldo Sanchez MD    menthol-zinc oxide (Calmoseptine - Risamine) 0.44-20.6 % ointment, , Topical, 4x daily PRN, Arnoldo Sanchez MD    meropenem (Merrem) 1 g in sodium chloride 0.9% IV 50 mL, 1 g, intravenous, q12h, Arnoldo Sanchez MD    nystatin (Mycostatin) 100,000 unit/gram powder 1 Application, 1 Application, Topical, BID, Arnoldo Sanchez MD    ondansetron ODT (Zofran-ODT) disintegrating tablet 4 mg, 4 mg, oral, q8h PRN, Arnoldo Sanchez MD    [START ON 12/1/2024] pantoprazole (ProtoNix) EC tablet 40 mg, 40 mg, oral, Daily before breakfast, Arnoldo Sanchez MD    polyethylene glycol (Glycolax, Miralax) packet 17 g, 17 g, oral, Daily PRN, Arnoldo Sanchez MD    sennosides (Senokot) tablet 17.2 mg, 2 tablet, oral, BID, Arnoldo Sanchez MD    sodium chloride 0.9% infusion, 100 mL/hr, intravenous, Continuous, Arnoldo Sanchez MD, Last Rate: 100 mL/hr at 11/30/24 1417, 100 mL/hr at 11/30/24 1417    topiramate (Topamax) tablet 50 mg, 50 mg, oral, Daily, Arnoldo Sanchez MD    vancomycin (Vancocin) pharmacy to dose - pharmacy monitoring, , miscellaneous, Daily PRN, Arnoldo Sanchez MD  CT abdomen pelvis wo IV contrast    Result Date:  11/30/2024  Interpreted By:  Aris Malone, STUDY: CT ABDOMEN PELVIS WO IV CONTRAST;  11/30/2024 10:09 am   INDICATION: Signs/Symptoms:buttock pain, wound, eval for abscess vs other. Acute renal injury/no contrast..   COMPARISON: CT abdomen and pelvis dated 12/08/2022.   ACCESSION NUMBER(S): [id number]   ORDERING CLINICIAN: JORGE ASHBY   TECHNIQUE: CT of the abdomen and pelvis was performed. Contiguous axial images were obtained at 3 mm slice thickness through the abdomen and pelvis. Coronal and sagittal reconstructions at 3 mm slice thickness were performed.  No intravenous contrast was administered.   Assessment of the abdominopelvic viscera is also limited by beam hardening artifact related to patient body habitus and positioning of the extremities.   FINDINGS: Please note that the evaluation of vessels, lymph nodes and organs is limited without intravenous contrast.   LOWER CHEST: Mild bibasilar scarring/atelectasis. Peripheral bronchiectasis and mucous plugging within the visualized lower lobes. Calcification of the mitral valve annulus. Aortic valve calcifications also suggested. Mild coronary artery calcifications within the visualized regions.   ABDOMEN:   LIVER: The liver demonstrates homogeneous attenuation without evidence of focal liver lesions.   BILE DUCTS: The intrahepatic and extrahepatic ducts are not dilated.   GALLBLADDER: No calcified stones. No wall thickening.   PANCREAS: Mild fatty infiltration of the pancreatic parenchyma. Otherwise within normal limits.   SPLEEN: Within normal limits.   ADRENAL GLANDS: Bilateral adrenal glands appear normal.   KIDNEYS AND URETERS: The kidneys are normal in size and unremarkable in appearance. Hydronephrosis or hydroureter. Questionable punctate nonobstructing right renal calculus (series 4, image 52).   PELVIS:   BLADDER: Ariza catheter within a collapsed bladder, limiting evaluation.   REPRODUCTIVE ORGANS: Uterus and ovaries are unremarkable in CT  appearance.   BOWEL: Small hiatal hernia. The stomach is otherwise unremarkable. Sigmoid colonic diverticulosis. There is mild element of fat stranding within this region (series 4, image 112),  possibly representing a mild element of acute uncomplicated sigmoid diverticulitis. There is mild upstream gaseous distention of the colon with air-fluid levels that could reflect an element of ileus or colitis. Similarly there are a few nonspecific air-fluid levels within the nondilated small bowel loops. Fluid-filled, otherwise normal appearing appendix within the right lower quadrant (series 4, image 97, for example).   VESSELS: Severe atherosclerotic calcification of the infrarenal abdominal aorta and iliac arteries. Ectasia of the infrarenal abdominal aorta measuring up to 2.9 cm in caliber.   PERITONEUM/RETROPERITONEUM/LYMPH NODES: No ascites or free air, no fluid collection.  No enlarged or suspicious lymph nodes.   ABDOMINAL WALL: Obesity. Fatty atrophy of the posterior paraspinal musculature. Tiny fat containing umbilical hernia.   BONES: No suspicious osseous lesions are identified. Diffusely decreased bone mineralization. Sclerosis of the bilateral SI joints. Extensive lower lumbar facet arthropathy with degenerative anterolisthesis of L4 on L5 and rotatory levo scoliosis of the thoracolumbar spine.       1. Sigmoid colonic diverticulosis with mild surrounding inflammatory fat stranding concerning for acute uncomplicated sigmoid colonic diverticulitis. There is mild upstream gaseous distention of the colon with air-fluid levels that may reflect an element of ileus or colitis. No evidence to indicate bowel obstruction. 2. Severe atherosclerosis with ectasia of the infrarenal abdominal aorta measuring up to 2.9 cm in caliber. 3. Bronchiectasis and peripheral mucous plugging within the lung bases. 4. Lumbar degenerative change. 5. Additional chronic findings as above.   MACRO: None   Signed by: Aris Malone  11/30/2024 10:42 AM Dictation workstation:   [id number]    XR chest 1 view    Result Date: 11/30/2024  Interpreted By:  Rekha Rojas, STUDY: XR CHEST 1 VIEW;  11/30/2024 7:57 am   INDICATION: Signs/Symptoms:sob.     COMPARISON: 12/08/2022   ACCESSION NUMBER(S): [id number]   ORDERING CLINICIAN: JORGE ASHBY   FINDINGS: Artifact from overlying monitoring leads. Right hilar fullness and perihilar interstitial prominence similar to the previous exam. No focal airspace consolidation or pleural effusion. The cardiac silhouette is within normal limits for size. Distended gas-filled stomach beneath the left diaphragm.       Perihilar interstitial prominence.   MACRO: None.   Signed by: Rekha Rojas 11/30/2024 8:24 AM Dictation workstation:   [id number]    No results found for the last 90 days.       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  Give supportive care.  Patient was has a 3, respiratory failure and he was vomiting.  The patient was Aspiration pneumonia.  Patient was slowly decubitus clear occupation has been started on broad-spectrum antibiotics Salter ID.  Consult Nephrology. Monitor blood cell count.  Monitor renal function panel.  Monitor electrolytes.   Monitor bicarb level.  Give IV fluid.  Monitor input output.  We will take deep vein thrombosis come fall, aspiration, decubitus, and deep vein thrombosis precautions.  This has been discussed with the patient and is agreeable  to it.               Arnoldo Sanchez MD

## 2024-11-30 NOTE — CONSULTS
"Vancomycin Dosing by Pharmacy- ROM INITIAL    Amrita Lopez is a 84 y.o. year old female who Pharmacy has been consulted for vancomycin dosing for cellulitis/skin and soft tissue infection. Based on the patient's indication and renal status this patient will be dosed based on a target level of SSTI/UTI: 10-15 mcg/mL    Patient is currently in ROM.    Initial Dosin mg x 1    Visit Vitals  /77 (BP Location: Left arm, Patient Position: Lying)   Pulse 78   Temp 36 °C (96.8 °F) (Temporal)   Resp 19           Lab Results   Component Value Date    CREATININE 1.98 (H) 2024    CREATININE 0.90 2024    CREATININE 0.90 2024    CREATININE 0.80 2024       No intake/output data recorded.    No results found for: \"PATIENTTEMP\"       Assessment/Plan     Random level within 24 hours of first dose will be obtained on  at 0500. May be obtained sooner if clinically indicated.   Will continue to monitor renal function daily while on vancomycin and order serum creatinine at least every 48 hours if not already ordered.  Follow for continued vancomycin needs, clinical response, and signs/symptoms of toxicity.     OSBALDO CEJA, PharmD   "

## 2024-12-01 ENCOUNTER — HOSPITAL ENCOUNTER (INPATIENT)
Facility: HOSPITAL | Age: 84
End: 2024-12-01
Attending: INTERNAL MEDICINE | Admitting: INTERNAL MEDICINE
Payer: MEDICARE

## 2024-12-01 LAB
ALBUMIN SERPL BCP-MCNC: 2 G/DL (ref 3.4–5)
ALP SERPL-CCNC: 81 U/L (ref 33–136)
ALT SERPL W P-5'-P-CCNC: 19 U/L (ref 7–45)
ANION GAP SERPL CALCULATED.3IONS-SCNC: 24 MMOL/L (ref 10–20)
AST SERPL W P-5'-P-CCNC: 23 U/L (ref 9–39)
BILIRUB SERPL-MCNC: 0.6 MG/DL (ref 0–1.2)
BUN SERPL-MCNC: 106 MG/DL (ref 6–23)
CALCIUM SERPL-MCNC: 8.1 MG/DL (ref 8.6–10.3)
CHLORIDE SERPL-SCNC: 106 MMOL/L (ref 98–107)
CO2 SERPL-SCNC: 14 MMOL/L (ref 21–32)
CREAT SERPL-MCNC: 2.56 MG/DL (ref 0.5–1.05)
EGFRCR SERPLBLD CKD-EPI 2021: 18 ML/MIN/1.73M*2
ERYTHROCYTE [DISTWIDTH] IN BLOOD BY AUTOMATED COUNT: 16.6 % (ref 11.5–14.5)
FLUAV RNA RESP QL NAA+PROBE: NOT DETECTED
FLUBV RNA RESP QL NAA+PROBE: NOT DETECTED
GLUCOSE SERPL-MCNC: 122 MG/DL (ref 74–99)
HCT VFR BLD AUTO: 35 % (ref 36–46)
HGB BLD-MCNC: 10.7 G/DL (ref 12–16)
HOLD SPECIMEN: NORMAL
LACTATE BLDV-SCNC: 3.4 MMOL/L (ref 0.4–2)
MCH RBC QN AUTO: 27.5 PG (ref 26–34)
MCHC RBC AUTO-ENTMCNC: 30.6 G/DL (ref 32–36)
MCV RBC AUTO: 90 FL (ref 80–100)
NRBC BLD-RTO: 0 /100 WBCS (ref 0–0)
PLATELET # BLD AUTO: 424 X10*3/UL (ref 150–450)
POTASSIUM SERPL-SCNC: 3.7 MMOL/L (ref 3.5–5.3)
PROT SERPL-MCNC: 4.4 G/DL (ref 6.4–8.2)
RBC # BLD AUTO: 3.89 X10*6/UL (ref 4–5.2)
RSV RNA RESP QL NAA+PROBE: NOT DETECTED
SARS-COV-2 RNA RESP QL NAA+PROBE: NOT DETECTED
SODIUM SERPL-SCNC: 140 MMOL/L (ref 136–145)
VANCOMYCIN SERPL-MCNC: 21.5 UG/ML (ref 5–20)
WBC # BLD AUTO: 27.7 X10*3/UL (ref 4.4–11.3)

## 2024-12-01 PROCEDURE — 2500000004 HC RX 250 GENERAL PHARMACY W/ HCPCS (ALT 636 FOR OP/ED): Performed by: INTERNAL MEDICINE

## 2024-12-01 PROCEDURE — 85027 COMPLETE CBC AUTOMATED: CPT | Performed by: INTERNAL MEDICINE

## 2024-12-01 PROCEDURE — 2500000001 HC RX 250 WO HCPCS SELF ADMINISTERED DRUGS (ALT 637 FOR MEDICARE OP): Performed by: INTERNAL MEDICINE

## 2024-12-01 PROCEDURE — 2500000004 HC RX 250 GENERAL PHARMACY W/ HCPCS (ALT 636 FOR OP/ED): Mod: JZ | Performed by: INTERNAL MEDICINE

## 2024-12-01 PROCEDURE — 94640 AIRWAY INHALATION TREATMENT: CPT

## 2024-12-01 PROCEDURE — 2060000001 HC INTERMEDIATE ICU ROOM DAILY

## 2024-12-01 PROCEDURE — 83605 ASSAY OF LACTIC ACID: CPT | Performed by: INTERNAL MEDICINE

## 2024-12-01 PROCEDURE — 2500000005 HC RX 250 GENERAL PHARMACY W/O HCPCS: Performed by: INTERNAL MEDICINE

## 2024-12-01 PROCEDURE — 87637 SARSCOV2&INF A&B&RSV AMP PRB: CPT | Performed by: INTERNAL MEDICINE

## 2024-12-01 PROCEDURE — 2500000002 HC RX 250 W HCPCS SELF ADMINISTERED DRUGS (ALT 637 FOR MEDICARE OP, ALT 636 FOR OP/ED): Performed by: INTERNAL MEDICINE

## 2024-12-01 PROCEDURE — 36415 COLL VENOUS BLD VENIPUNCTURE: CPT | Performed by: INTERNAL MEDICINE

## 2024-12-01 PROCEDURE — 84075 ASSAY ALKALINE PHOSPHATASE: CPT | Performed by: INTERNAL MEDICINE

## 2024-12-01 PROCEDURE — 94664 DEMO&/EVAL PT USE INHALER: CPT

## 2024-12-01 PROCEDURE — 80202 ASSAY OF VANCOMYCIN: CPT | Performed by: INTERNAL MEDICINE

## 2024-12-01 RX ORDER — MUPIROCIN 20 MG/G
OINTMENT TOPICAL 2 TIMES DAILY
Status: COMPLETED | OUTPATIENT
Start: 2024-12-01 | End: 2024-12-06

## 2024-12-01 ASSESSMENT — COGNITIVE AND FUNCTIONAL STATUS - GENERAL
STANDING UP FROM CHAIR USING ARMS: TOTAL
DAILY ACTIVITIY SCORE: 8
TOILETING: TOTAL
WALKING IN HOSPITAL ROOM: TOTAL
MOVING TO AND FROM BED TO CHAIR: TOTAL
HELP NEEDED FOR BATHING: TOTAL
TURNING FROM BACK TO SIDE WHILE IN FLAT BAD: TOTAL
CLIMB 3 TO 5 STEPS WITH RAILING: TOTAL
EATING MEALS: A LOT
DRESSING REGULAR LOWER BODY CLOTHING: TOTAL
EATING MEALS: A LOT
PERSONAL GROOMING: A LOT
DRESSING REGULAR UPPER BODY CLOTHING: TOTAL
TOILETING: TOTAL
MOBILITY SCORE: 6
DRESSING REGULAR UPPER BODY CLOTHING: TOTAL
DRESSING REGULAR LOWER BODY CLOTHING: TOTAL
DAILY ACTIVITIY SCORE: 8
MOVING FROM LYING ON BACK TO SITTING ON SIDE OF FLAT BED WITH BEDRAILS: TOTAL
MOBILITY SCORE: 6
PERSONAL GROOMING: A LOT
STANDING UP FROM CHAIR USING ARMS: TOTAL
HELP NEEDED FOR BATHING: TOTAL
MOVING TO AND FROM BED TO CHAIR: TOTAL
MOVING FROM LYING ON BACK TO SITTING ON SIDE OF FLAT BED WITH BEDRAILS: TOTAL
CLIMB 3 TO 5 STEPS WITH RAILING: TOTAL
WALKING IN HOSPITAL ROOM: TOTAL
TURNING FROM BACK TO SIDE WHILE IN FLAT BAD: TOTAL

## 2024-12-01 ASSESSMENT — PAIN - FUNCTIONAL ASSESSMENT
PAIN_FUNCTIONAL_ASSESSMENT: 0-10
PAIN_FUNCTIONAL_ASSESSMENT: 0-10

## 2024-12-01 ASSESSMENT — PAIN SCALES - GENERAL
PAINLEVEL_OUTOF10: 0 - NO PAIN

## 2024-12-01 NOTE — CONSULTS
Reason For Consult  ROM    History Of Present Illness  Amrita Lopez is a 84 y.o. female with a past medical history of hyperlipidemia who presented to the hospital with dyspnea.  Patient is also notably been having vomiting and diarrhea per the nurse.  The patient is a poor historian, difficult to understand, did know she was in the hospital, thought the year was 2025 and not really able to provide much of a history due to her condition.  Notably she has had purple urine in the Ariza bag per the nurse.  Urine culture in process, may have a UTI.  Found to have acute kidney injury with a creatinine of 1.98.  Appears her baseline creatinine is 0.9.  Also found to have metabolic acidosis and notably on Topamax.  We are consulted for management of acute kidney injury.     Past Medical History  She has a past medical history of Chronic bronchiolitis (Multi), Hyperlipidemia, Nicotine dependence, Stroke (Multi), and Vitamin B12 deficiency.    Surgical History  She has a past surgical history that includes MR angio head wo IV contrast (11/03/2020); MR angio head wo IV contrast (03/29/2021); MR angio neck wo IV contrast (03/30/2021); Tonsillectomy; and Adenoidectomy.     Social History  She reports that she has been smoking cigarettes. She has never used smokeless tobacco. Alcohol use questions deferred to the physician. She reports that she does not use drugs.    Family History  Family History   Problem Relation Name Age of Onset    Other (CP) Daughter      Alcohol abuse Son          Allergies  Nickel    Review of Systems  Difficulty obtaining a review of systems from the patient due to her condition     Physical Exam  General: Awake, alert, a little confused, no acute distress  Head/ears/nose/throat:  Normocephalic, dried blood in mouth  Neck:  No jugular venous distention, neck supple  Respiratory:  Diminished breath sounds, normal respiratory effort  Cardiovascular:  Some distant heart sounds however no rubs  "appreciated  Gastrointestinal:  Soft, positive bowel sounds, no rebound or guarding  Extremities:  Mild edema, no cyanosis  Musculoskeletal:  Unable to assess due to patient condition  Neurologic:  A little confused, responsive, difficult to understand  Psychiatric: unable to assess due to patient condition         I&O 24HR    Intake/Output Summary (Last 24 hours) at 11/30/2024 6716  Last data filed at 11/30/2024 1556  Gross per 24 hour   Intake 50 ml   Output --   Net 50 ml       Vitals 24HR  Heart Rate:  [73-96]   Temp:  [35.9 °C (96.6 °F)-36.2 °C (97.2 °F)]   Resp:  [15-25]   BP: (129-158)/(66-94)   Height:  [162.6 cm (5' 4\")]   Weight:  [136 kg (300 lb)]   SpO2:  [92 %-98 %]       Relevant Results  Results for orders placed or performed during the hospital encounter of 11/30/24 (from the past 24 hours)   CBC and Auto Differential   Result Value Ref Range    WBC 29.1 (H) 4.4 - 11.3 x10*3/uL    nRBC 0.0 0.0 - 0.0 /100 WBCs    RBC 4.46 4.00 - 5.20 x10*6/uL    Hemoglobin 12.4 12.0 - 16.0 g/dL    Hematocrit 39.5 36.0 - 46.0 %    MCV 89 80 - 100 fL    MCH 27.8 26.0 - 34.0 pg    MCHC 31.4 (L) 32.0 - 36.0 g/dL    RDW 16.5 (H) 11.5 - 14.5 %    Platelets 448 150 - 450 x10*3/uL    Immature Granulocytes %, Automated 2.3 (H) 0.0 - 0.9 %    Immature Granulocytes Absolute, Automated 0.66 (H) 0.00 - 0.50 x10*3/uL   Basic Metabolic Panel   Result Value Ref Range    Glucose 217 (H) 74 - 99 mg/dL    Sodium 135 (L) 136 - 145 mmol/L    Potassium 3.7 3.5 - 5.3 mmol/L    Chloride 103 98 - 107 mmol/L    Bicarbonate 16 (L) 21 - 32 mmol/L    Anion Gap 20 10 - 20 mmol/L    Urea Nitrogen 94 (HH) 6 - 23 mg/dL    Creatinine 1.98 (H) 0.50 - 1.05 mg/dL    eGFR 25 (L) >60 mL/min/1.73m*2    Calcium 8.9 8.6 - 10.3 mg/dL   B-Type Natriuretic Peptide   Result Value Ref Range     (H) 0 - 99 pg/mL   Troponin I, High Sensitivity, Initial   Result Value Ref Range    Troponin I, High Sensitivity 17 (H) 0 - 13 ng/L   Manual Differential   Result " Value Ref Range    Neutrophils %, Manual 29.0 40.0 - 80.0 %    Bands %, Manual 63.0 0.0 - 5.0 %    Lymphocytes %, Manual 5.0 13.0 - 44.0 %    Monocytes %, Manual 1.0 2.0 - 10.0 %    Eosinophils %, Manual 0.0 0.0 - 6.0 %    Basophils %, Manual 0.0 0.0 - 2.0 %    Metamyelocytes %, Manual 2.0 0.0 - 0.0 %    Seg Neutrophils Absolute, Manual 8.44 (H) 1.60 - 5.00 x10*3/uL    Bands Absolute, Manual 18.33 (H) 0.00 - 0.50 x10*3/uL    Lymphocytes Absolute, Manual 1.46 0.80 - 3.00 x10*3/uL    Monocytes Absolute, Manual 0.29 0.05 - 0.80 x10*3/uL    Eosinophils Absolute, Manual 0.00 0.00 - 0.40 x10*3/uL    Basophils Absolute, Manual 0.00 0.00 - 0.10 x10*3/uL    Metamyelocytes Absolute, Manual 0.58 0.00 - 0.00 x10*3/uL    Total Cells Counted 100     Neutrophils Absolute, Manual 26.77 (H) 1.60 - 5.50 x10*3/uL    RBC Morphology See Below     Sebring Cells Few    Hepatic function panel   Result Value Ref Range    Albumin 2.5 (L) 3.4 - 5.0 g/dL    Bilirubin, Total 0.5 0.0 - 1.2 mg/dL    Bilirubin, Direct 0.1 0.0 - 0.3 mg/dL    Alkaline Phosphatase 91 33 - 136 U/L    ALT 11 7 - 45 U/L    AST 10 9 - 39 U/L    Total Protein 5.8 (L) 6.4 - 8.2 g/dL   Lactate   Result Value Ref Range    Lactate 2.4 (H) 0.4 - 2.0 mmol/L   Blood Culture    Specimen: Peripheral Venipuncture; Blood culture   Result Value Ref Range    Blood Culture Loaded on Instrument - Culture in progress    Blood Culture    Specimen: Peripheral Venipuncture; Blood culture   Result Value Ref Range    Blood Culture Loaded on Instrument - Culture in progress    MRSA Surveillance for Vancomycin De-escalation, PCR    Specimen: Anterior Nares; Swab   Result Value Ref Range    MRSA PCR Detected (A) Not Detected   Urinalysis with Reflex Culture and Microscopic   Result Value Ref Range    Color, Urine Orange (N) Light-Yellow, Yellow, Dark-Yellow    Appearance, Urine Ex.Turbid (N) Clear    Specific Gravity, Urine 1.022 1.005 - 1.035    pH, Urine 8.0 5.0, 5.5, 6.0, 6.5, 7.0, 7.5, 8.0     Protein, Urine 50 (1+) (A) NEGATIVE, 10 (TRACE), 20 (TRACE) mg/dL    Glucose, Urine Normal Normal mg/dL    Blood, Urine 0.2 (2+) (A) NEGATIVE    Ketones, Urine NEGATIVE NEGATIVE mg/dL    Bilirubin, Urine NEGATIVE NEGATIVE    Urobilinogen, Urine Normal Normal mg/dL    Nitrite, Urine NEGATIVE NEGATIVE    Leukocyte Esterase, Urine NEGATIVE NEGATIVE   Urinalysis Microscopic   Result Value Ref Range    WBC, Urine >50 (A) 1-5, NONE /HPF    WBC Clumps, Urine MODERATE Reference range not established. /HPF    RBC, Urine >20 (A) NONE, 1-2, 3-5 /HPF    Squamous Epithelial Cells, Urine 1-9 (SPARSE) Reference range not established. /HPF    Bacteria, Urine 2+ (A) NONE SEEN /HPF    Mucus, Urine FEW Reference range not established. /LPF    Hyaline Casts, Urine 1+ (A) NONE /LPF    Triple Phosphate Crystals, Urine 1+ NONE, 1+ /HPF   Troponin, High Sensitivity, 1 Hour   Result Value Ref Range    Troponin I, High Sensitivity 20 (H) 0 - 13 ng/L   Lactate   Result Value Ref Range    Lactate 2.0 0.4 - 2.0 mmol/L   Creatine Kinase   Result Value Ref Range    Creatine Kinase 11 0 - 215 U/L   Blood Gas Arterial Full Panel   Result Value Ref Range    POCT pH, Arterial 7.31 (L) 7.38 - 7.42 pH    POCT pCO2, Arterial 29 (L) 38 - 42 mm Hg    POCT pO2, Arterial 86 85 - 95 mm Hg    POCT SO2, Arterial 98 94 - 100 %    POCT Oxy Hemoglobin, Arterial 95.9 94.0 - 98.0 %    POCT Hematocrit Calculated, Arterial 35.0 (L) 36.0 - 46.0 %    POCT Sodium, Arterial 132 (L) 136 - 145 mmol/L    POCT Potassium, Arterial 3.6 3.5 - 5.3 mmol/L    POCT Chloride, Arterial 103 98 - 107 mmol/L    POCT Ionized Calcium, Arterial 1.29 1.10 - 1.33 mmol/L    POCT Glucose, Arterial 137 (H) 74 - 99 mg/dL    POCT Lactate, Arterial 2.3 (H) 0.4 - 2.0 mmol/L    POCT Base Excess, Arterial -10.4 (L) -2.0 - 3.0 mmol/L    POCT HCO3 Calculated, Arterial 14.6 (L) 22.0 - 26.0 mmol/L    POCT Hemoglobin, Arterial 11.8 (L) 12.0 - 16.0 g/dL    POCT Anion Gap, Arterial 18 10 - 25 mmo/L     Patient Temperature 37.0 degrees Celsius    FiO2 36 %    Apparatus CANNULA     Site of Arterial Puncture Radial Right     Tony's Test Positive    Blood Gas Lactic Acid, Venous   Result Value Ref Range    POCT Lactate, Venous 4.0 (HH) 0.4 - 2.0 mmol/L          Assessment/Plan   Acute kidney injury likely secondary to volume depletion  Metabolic acidosis in setting of acute kidney injury and Topamax  Possible UTI    Plan: Bicarbonate drip started, hold diuretics, hold Topamax.  Unclear why she is on Topamax but recommend switching to an alternate therapy given her significant metabolic acidosis.  Avoid NSAIDs and nephrotoxic agents.  Monitor culture results.  Infectious disease on board.  Thank you for your consultation.    Meaghan Choe MD

## 2024-12-01 NOTE — PROGRESS NOTES
[facility]    Amrita Lopez is a 84 y.o. year old female who Pharmacy has been consulted for vancomycin dosing for cellulitis/skin and soft tissue infection. Based on the patient's indication and renal status this patient will be dosed based on a target level of SSTI/UTI: 10-15 mcg/mL    Patient is currently in ROM    Last vancomycin dose (incl. date and time): 2000 mg on 11/30 at 0910    Vancomycin level (incl. date and time): 21.5 mcg/mL on 12/1 at 0426    Lab Results   Component Value Date    VANCORANDOM 21.5 (H) 12/01/2024       Visit Vitals  BP (!) 139/48 (BP Location: Right arm, Patient Position: Lying)   Pulse 82   Temp 36.5 °C (97.7 °F) (Temporal)   Resp 16           Lab Results   Component Value Date    CREATININE 2.56 (H) 12/01/2024    CREATININE 1.98 (H) 11/30/2024    CREATININE 0.90 05/24/2024    CREATININE 0.90 05/23/2024       I/O last 3 completed shifts:  In: 1446.7 (12.3 mL/kg) [I.V.:1346.7 (11.5 mL/kg); IV Piggyback:100]  Out: 400 (3.4 mL/kg) [Urine:400 (0.1 mL/kg/hr)]  Weight: 117.6 kg     Assessment/Plan     Level is above target goal.   hold vancomycin.  Random level within 24 hours will be obtained on 12/2 at 0500. May be obtained sooner if clinically indicated.   Will continue to monitor renal function daily while on vancomycin and order serum creatinine at least every 48 hours if not already ordered.  Follow for continued vancomycin needs, clinical response, and signs/symptoms of toxicity.     HEATHER Miranda, MaximilianoD

## 2024-12-01 NOTE — PROGRESS NOTES
Physical Therapy                 Therapy Communication Note    Patient Name: Amrita Lopez  MRN: [id number]  Department: [facility]  Room: 420/420-A  Today's Date: 12/1/2024     Discipline: Physical Therapy    Missed Visit Reason: Other (Comment) (PT Screen Complete)    PT SCREEN    PT consult received, chart reviewed, spoke with nursing, and pt seen in her room. Pt is aware she is in the hospital and is relatively oriented to time (thought it was November 2025). Pt is difficult to understand and is following ~50% simple commands accurately. Pt has been in the NH since May and has recently transitioned to hospice per the paperwork from [place]. Pt indicates a tay lift and 2 people are required for transfer from bed to wheelchair, however then pt indicates she can ambulate with a rolling walker. Pt has multiple wounds B LEs/buttocks (see media pictures for details). Did attempt to perform rolling/bed mobility, dependent for attempts.  B LEs are edematous with poor ROM, pt indicating pain when ROM attempted. Pt appears to be dependent for ADLs and mobility at baseline. PT screen complete. Formal evaluation deferred. Will discontinue PT order.

## 2024-12-01 NOTE — PROGRESS NOTES
Amrita Lopez is a 84 y.o. female on day 1 of admission presenting with Shortness of breath.      Subjective   Patient's BUN and creatinine continue to rise, per the nurse has been in and out in terms of her mental status, she is not eating or drinking, is oliguric made 400 cc of urine yesterday.       Objective          Vitals 24HR  Heart Rate:  [82-85]   Temp:  [35.9 °C (96.6 °F)-36.8 °C (98.2 °F)]   Resp:  [16-19]   BP: (109-156)/(48-66)   Weight:  [118 kg (259 lb 4.2 oz)]   SpO2:  [92 %-98 %]       Intake/Output last 3 Shifts:    Intake/Output Summary (Last 24 hours) at 12/1/2024 1343  Last data filed at 12/1/2024 1026  Gross per 24 hour   Intake 1890 ml   Output 400 ml   Net 1490 ml       Physical Exam  General: Awake, alert, difficult to understand knows she is in a hospital, thought the year is 2025, no acute distress  Respiratory:  Diminished breath sounds, normal respiratory effort  Cardiovascular:  no rubs appreciated  Gastrointestinal:  Soft, mildly tender, positive bowel sounds, no rebound or guarding  Extremities:  Mild edema, no cyanosis    Relevant Results  Results for orders placed or performed during the hospital encounter of 11/30/24 (from the past 24 hours)   Blood Gas Arterial Full Panel   Result Value Ref Range    POCT pH, Arterial 7.31 (L) 7.38 - 7.42 pH    POCT pCO2, Arterial 29 (L) 38 - 42 mm Hg    POCT pO2, Arterial 86 85 - 95 mm Hg    POCT SO2, Arterial 98 94 - 100 %    POCT Oxy Hemoglobin, Arterial 95.9 94.0 - 98.0 %    POCT Hematocrit Calculated, Arterial 35.0 (L) 36.0 - 46.0 %    POCT Sodium, Arterial 132 (L) 136 - 145 mmol/L    POCT Potassium, Arterial 3.6 3.5 - 5.3 mmol/L    POCT Chloride, Arterial 103 98 - 107 mmol/L    POCT Ionized Calcium, Arterial 1.29 1.10 - 1.33 mmol/L    POCT Glucose, Arterial 137 (H) 74 - 99 mg/dL    POCT Lactate, Arterial 2.3 (H) 0.4 - 2.0 mmol/L    POCT Base Excess, Arterial -10.4 (L) -2.0 - 3.0 mmol/L    POCT HCO3 Calculated, Arterial 14.6 (L) 22.0 - 26.0  mmol/L    POCT Hemoglobin, Arterial 11.8 (L) 12.0 - 16.0 g/dL    POCT Anion Gap, Arterial 18 10 - 25 mmo/L    Patient Temperature 37.0 degrees Celsius    FiO2 36 %    Apparatus CANNULA     Site of Arterial Puncture Radial Right     Tony's Test Positive    Blood Gas Lactic Acid, Venous   Result Value Ref Range    POCT Lactate, Venous 4.0 (HH) 0.4 - 2.0 mmol/L   Comprehensive metabolic panel   Result Value Ref Range    Glucose 122 (H) 74 - 99 mg/dL    Sodium 140 136 - 145 mmol/L    Potassium 3.7 3.5 - 5.3 mmol/L    Chloride 106 98 - 107 mmol/L    Bicarbonate 14 (L) 21 - 32 mmol/L    Anion Gap 24 (H) 10 - 20 mmol/L    Urea Nitrogen 106 (HH) 6 - 23 mg/dL    Creatinine 2.56 (H) 0.50 - 1.05 mg/dL    eGFR 18 (L) >60 mL/min/1.73m*2    Calcium 8.1 (L) 8.6 - 10.3 mg/dL    Albumin 2.0 (L) 3.4 - 5.0 g/dL    Alkaline Phosphatase 81 33 - 136 U/L    Total Protein 4.4 (L) 6.4 - 8.2 g/dL    AST 23 9 - 39 U/L    Bilirubin, Total 0.6 0.0 - 1.2 mg/dL    ALT 19 7 - 45 U/L   CBC   Result Value Ref Range    WBC 27.7 (H) 4.4 - 11.3 x10*3/uL    nRBC 0.0 0.0 - 0.0 /100 WBCs    RBC 3.89 (L) 4.00 - 5.20 x10*6/uL    Hemoglobin 10.7 (L) 12.0 - 16.0 g/dL    Hematocrit 35.0 (L) 36.0 - 46.0 %    MCV 90 80 - 100 fL    MCH 27.5 26.0 - 34.0 pg    MCHC 30.6 (L) 32.0 - 36.0 g/dL    RDW 16.6 (H) 11.5 - 14.5 %    Platelets 424 150 - 450 x10*3/uL   Vancomycin   Result Value Ref Range    Vancomycin 21.5 (H) 5.0 - 20.0 ug/mL   BLOOD GAS LACTIC ACID, VENOUS   Result Value Ref Range    POCT Lactate, Venous 3.4 (H) 0.4 - 2.0 mmol/L   RSV PCR   Result Value Ref Range    RSV PCR Not Detected Not Detected   Influenza A, and B PCR   Result Value Ref Range    Flu A Result Not Detected Not Detected    Flu B Result Not Detected Not Detected   Sars-CoV-2 PCR   Result Value Ref Range    Coronavirus 2019, PCR Not Detected Not Detected            Assessment/Plan   Acute kidney injury likely with acute tubular necrosis in the setting of volume depletion and  sepsis  Metabolic acidosis in setting of acute kidney injury and Topamax  UTI  Sepsis     Plan: At this point it looks like the patient will likely need dialysis in the next 24 hours, she is oliguric and her BUN and creatinine continue to rise, I did discuss the indications, risks and benefits of renal replacement therapy with the patient's son as I do not believe with the patient's overall condition that she has total insight or ability to give a consent herself.  The patient's son did give consent to proceed with dialysis if it is needed.  I spoke to Dr. Sanchez the primary team and recommended the patient be transferred to [facility] ICU. Patient can have a dialysis catheter placed there.  On bicarbonate drip, holding diuretics, hold Topamax.  Unclear why she is on Topamax but I reached out to the primary team to recommend switching to an alternate therapy given her significant metabolic acidosis.  Avoid NSAIDs and nephrotoxic agents.  Monitor culture results.  Infectious disease on board.      Meaghan Choe MD

## 2024-12-01 NOTE — PROGRESS NOTES
12/01/24 1315   Discharge Planning   Expected Discharge Disposition Inter   Does the patient need discharge transport arranged? Yes   RoundTrip coordination needed? Yes     Patient is new admission, arrived from [place].      Return referral built and sent to facility for review, awaiting response regarding barriers to return when medically ready for discharge.

## 2024-12-01 NOTE — CARE PLAN
The clinical goals for the shift include remain hds      Problem: Skin  Goal: Prevent/minimize sheer/friction injuries  Outcome: Progressing  Flowsheets (Taken 11/30/2024 2985)  Prevent/minimize sheer/friction injuries: Turn/reposition every 2 hours/use positioning/transfer devices     Problem: Fall/Injury  Goal: Not fall by end of shift  Outcome: Progressing     Problem: Respiratory  Goal: Minimal/no exertional discomfort or dyspnea this shift  Outcome: Progressing     Problem: Safety - Adult  Goal: Free from fall injury  Outcome: Progressing     Problem: Discharge Planning  Goal: Discharge to home or other facility with appropriate resources  Outcome: Progressing     Problem: Chronic Conditions and Co-morbidities  Goal: Patient's chronic conditions and co-morbidity symptoms are monitored and maintained or improved  Outcome: Progressing

## 2024-12-01 NOTE — CONSULTS
Inpatient consult to Infectious Diseases  Consult performed by: Jose Alberto Spence MD  Consult ordered by: Arnoldo Sanchez MD            Primary MD: Arnoldo Sanchez MD    Reason For Consult  Sepsis    History Of Present Illness  Amrita Lopez is a 84 y.o. female presenting with shortness of breath.  I have been asked to see her because of sepsis.  She is a poor historian, so history was obtained from the chart.  She was found to be hypoxic and placed on 2 L of oxygen.  CT of the abdomen and pelvis was remarkable for bronchiectasis.  I workup was markable for acute kidney injury.  Nephrology is on consult.  She is on meropenem     Past Medical History  She has a past medical history of Chronic bronchiolitis (Multi), Hyperlipidemia, Nicotine dependence, Stroke (Multi), and Vitamin B12 deficiency.    Surgical History  She has a past surgical history that includes MR angio head wo IV contrast (11/03/2020); MR angio head wo IV contrast (03/29/2021); MR angio neck wo IV contrast (03/30/2021); Tonsillectomy; and Adenoidectomy.     Social History     Occupational History    Not on file   Tobacco Use    Smoking status: Every Day     Current packs/day: 0.50     Types: Cigarettes    Smokeless tobacco: Never   Substance and Sexual Activity    Alcohol use: Defer    Drug use: Never    Sexual activity: Not on file     Travel History   Travel since 11/01/24    No documented travel since 11/01/24         Family History  Family History   Problem Relation Name Age of Onset    Other (CP) Daughter      Alcohol abuse Son       Allergies  Nickel     Immunization History   Administered Date(s) Administered    Moderna SARS-CoV-2 Vaccination 05/06/2021, 06/06/2021     Medications  Home medications:  Medications Prior to Admission   Medication Sig Dispense Refill Last Dose/Taking    acetaminophen (Tylenol) 325 mg tablet Take 2 tablets (650 mg) by mouth every 6 hours if needed for mild pain (1 - 3), headaches or fever (temp greater  than 38.0 C).       albuterol 108 (90 Base) MCG/ACT inhaler Inhale 2 puffs every 2 hours if needed for wheezing or shortness of breath.       ammonium lactate (Lac-Hydrin) 12 % lotion Apply 1 Application topically 2 times a day.       aspirin 81 mg EC tablet Chew 1 tablet (81 mg) once daily.       atorvastatin (Lipitor) 10 mg tablet Take 1 tablet (10 mg) by mouth once daily at bedtime.       cholecalciferol (Vitamin D-3) 5,000 Units tablet Take 1 tablet (5,000 Units) by mouth once daily.       clopidogrel (Plavix) 75 mg tablet Take 1 tablet (75 mg) by mouth once daily.       cyanocobalamin (Vitamin B-12) 1,000 mcg tablet Take 0.5 tablets (500 mcg) by mouth once daily.       furosemide (Lasix) 40 mg tablet Take 1 tablet (40 mg) by mouth once daily.       inhalational spacing device inhaler Use as directed with inhalers 1 each 0     ipratropium-albuteroL (Duo-Neb) 0.5-2.5 mg/3 mL nebulizer solution Take 3 mL by nebulization every 6 hours.       loperamide (Imodium A-D) 2 mg tablet Take 1 tablet (2 mg) by mouth 4 times a day as needed for diarrhea. Take 2 tablets (4mg) po with first loose stool, and 1 tablet (2mg) with each additional loose stool. Not to exceed 16mg/day. 30 tablet 0     loratadine (Claritin) 10 mg tablet Take 1 tablet (10 mg) by mouth once daily.       magnesium hydroxide (Milk of Magnesia) 2,400 mg/10 mL suspension suspension Take 30 mL by mouth once daily as needed for constipation.       meloxicam (Mobic) 7.5 mg tablet Take 1 tablet (7.5 mg) by mouth once daily. Administer with food to reduce GI upset.       menthol-zinc oxide (Zinc-Oxyde Plus) 0.44-20 % ointment Apply 1 Application topically if needed. After incontinence       nystatin (Mycostatin) 100,000 unit/gram powder Apply 1 Application topically 2 times a day. Apply to groin.       omeprazole OTC (PriLOSEC OTC) 20 mg EC tablet Take 1 tablet (20 mg) by mouth once daily in the morning. Take before meals. Do not crush, chew, or split.        "ondansetron ODT (Zofran-ODT) 4 mg disintegrating tablet Take 1 tablet (4 mg) by mouth every 8 hours if needed for nausea or vomiting. 20 tablet 0     polyethylene glycol (Glycolax, Miralax) 17 gram packet Take 17 g by mouth once daily as needed (constipation).       sennosides (Senokot) 8.6 mg tablet Take 2 tablets (17.2 mg) by mouth 2 times a day.       topiramate (Topamax) 50 mg tablet Take 1 tablet (50 mg) by mouth once daily. 30 tablet 5     wound dressings (Triad Wound Dressing) paste Apply 1 Application topically once daily. Apply to affected areas (buttocks, posterior thighs) once daily, do not scrub off prior application when reapplying. Apply 1/16\" thick. 170 g 1      Current medications:  Scheduled medications  ammonium lactate, 1 Application, Topical, BID  aspirin, 81 mg, oral, Daily  atorvastatin, 10 mg, oral, Nightly  cetirizine, 10 mg, oral, Daily  cholecalciferol, 5,000 Units, oral, Daily  clopidogrel, 75 mg, oral, Daily  cyanocobalamin, 500 mcg, oral, Daily  heparin (porcine), 7,500 Units, subcutaneous, q8h ANNIE  ipratropium-albuteroL, 3 mL, nebulization, TID  meropenem, 1 g, intravenous, q12h  nystatin, 1 Application, Topical, BID  pantoprazole, 40 mg, oral, Daily before breakfast  sennosides, 2 tablet, oral, BID  [Held by provider] topiramate, 50 mg, oral, Daily      Continuous medications  sodium chloride 0.45 % 1,000 mL with sodium bicarbonate 1 mEq/mL (8.4 %) 75 mEq infusion, 100 mL/hr, Last Rate: 100 mL/hr (12/01/24 2059)      PRN medications  PRN medications: acetaminophen **OR** acetaminophen **OR** acetaminophen, albuterol, benzocaine-menthol, dextromethorphan-guaifenesin, guaiFENesin, loperamide, magnesium hydroxide, menthol-zinc oxide, ondansetron ODT, polyethylene glycol, vancomycin    Review of Systems   Unable to perform ROS: Mental status change        Objective  Range of Vitals (last 24 hours)  Heart Rate:  [73-85]   Temp:  [35.9 °C (96.6 °F)-36.8 °C (98.2 °F)]   Resp:  [15-19]   BP: " "(109-158)/(48-77)   Weight:  [118 kg (259 lb 4.2 oz)]   SpO2:  [92 %-98 %]   Daily Weight  12/01/24 : 118 kg (259 lb 4.2 oz)    Body mass index is 44.5 kg/m².     Physical Exam  HENT:      Head: Normocephalic and atraumatic.      Nose: Nose normal.   Eyes:      General: No scleral icterus.     Conjunctiva/sclera: Conjunctivae normal.   Cardiovascular:      Rate and Rhythm: Normal rate and regular rhythm.   Pulmonary:      Breath sounds: Decreased breath sounds present.   Abdominal:      General: Bowel sounds are normal.      Palpations: Abdomen is soft.   Musculoskeletal:      Cervical back: Normal range of motion and neck supple.      Right lower leg: No edema.      Left lower leg: No edema.   Skin:     General: Skin is warm and dry.   Neurological:      Mental Status: She is lethargic.   Psychiatric:      Comments: Unable to assess          Relevant Results  Outside Hospital Results    Labs  Results from last 72 hours   Lab Units 12/01/24 0426 11/30/24  0707   WBC AUTO x10*3/uL 27.7* 29.1*   HEMOGLOBIN g/dL 10.7* 12.4   HEMATOCRIT % 35.0* 39.5   PLATELETS AUTO x10*3/uL 424 448   LYMPHO PCT MAN %  --  5.0   MONO PCT MAN %  --  1.0   EOSINO PCT MAN %  --  0.0     Results from last 72 hours   Lab Units 12/01/24 0426 11/30/24  0707   SODIUM mmol/L 140 135*   POTASSIUM mmol/L 3.7 3.7   CHLORIDE mmol/L 106 103   CO2 mmol/L 14* 16*   BUN mg/dL 106* 94*   CREATININE mg/dL 2.56* 1.98*   GLUCOSE mg/dL 122* 217*   CALCIUM mg/dL 8.1* 8.9   ANION GAP mmol/L 24* 20   EGFR mL/min/1.73m*2 18* 25*     Results from last 72 hours   Lab Units 12/01/24 0426 11/30/24  0707   ALK PHOS U/L 81 91   BILIRUBIN TOTAL mg/dL 0.6 0.5   BILIRUBIN DIRECT mg/dL  --  0.1   PROTEIN TOTAL g/dL 4.4* 5.8*   ALT U/L 19 11   AST U/L 23 10   ALBUMIN g/dL 2.0* 2.5*     Estimated Creatinine Clearance: 21.4 mL/min (A) (by C-G formula based on SCr of 2.56 mg/dL (H)).  No results found for: \"CRP\", \"SEDRATE\"  No results found for: \"HIV1X2\", \"HIVCONF\", " "\"KLGJEY1TQ\"  No results found for: \"HEPCABINIT\", \"HEPCAB\", \"HCVPCRQUANT\"  Microbiology  Reviewed- UA is remarkable for pyuria  Imaging  CT abdomen pelvis wo IV contrast    Result Date: 11/30/2024  Interpreted By:  Aris Malone, STUDY: CT ABDOMEN PELVIS WO IV CONTRAST;  11/30/2024 10:09 am   INDICATION: Signs/Symptoms:buttock pain, wound, eval for abscess vs other. Acute renal injury/no contrast..   COMPARISON: CT abdomen and pelvis dated 12/08/2022.   ACCESSION NUMBER(S): [id number]   ORDERING CLINICIAN: JORGE ASHBY   TECHNIQUE: CT of the abdomen and pelvis was performed. Contiguous axial images were obtained at 3 mm slice thickness through the abdomen and pelvis. Coronal and sagittal reconstructions at 3 mm slice thickness were performed.  No intravenous contrast was administered.   Assessment of the abdominopelvic viscera is also limited by beam hardening artifact related to patient body habitus and positioning of the extremities.   FINDINGS: Please note that the evaluation of vessels, lymph nodes and organs is limited without intravenous contrast.   LOWER CHEST: Mild bibasilar scarring/atelectasis. Peripheral bronchiectasis and mucous plugging within the visualized lower lobes. Calcification of the mitral valve annulus. Aortic valve calcifications also suggested. Mild coronary artery calcifications within the visualized regions.   ABDOMEN:   LIVER: The liver demonstrates homogeneous attenuation without evidence of focal liver lesions.   BILE DUCTS: The intrahepatic and extrahepatic ducts are not dilated.   GALLBLADDER: No calcified stones. No wall thickening.   PANCREAS: Mild fatty infiltration of the pancreatic parenchyma. Otherwise within normal limits.   SPLEEN: Within normal limits.   ADRENAL GLANDS: Bilateral adrenal glands appear normal.   KIDNEYS AND URETERS: The kidneys are normal in size and unremarkable in appearance. Hydronephrosis or hydroureter. Questionable punctate nonobstructing right renal " calculus (series 4, image 52).   PELVIS:   BLADDER: Ariza catheter within a collapsed bladder, limiting evaluation.   REPRODUCTIVE ORGANS: Uterus and ovaries are unremarkable in CT appearance.   BOWEL: Small hiatal hernia. The stomach is otherwise unremarkable. Sigmoid colonic diverticulosis. There is mild element of fat stranding within this region (series 4, image 112),  possibly representing a mild element of acute uncomplicated sigmoid diverticulitis. There is mild upstream gaseous distention of the colon with air-fluid levels that could reflect an element of ileus or colitis. Similarly there are a few nonspecific air-fluid levels within the nondilated small bowel loops. Fluid-filled, otherwise normal appearing appendix within the right lower quadrant (series 4, image 97, for example).   VESSELS: Severe atherosclerotic calcification of the infrarenal abdominal aorta and iliac arteries. Ectasia of the infrarenal abdominal aorta measuring up to 2.9 cm in caliber.   PERITONEUM/RETROPERITONEUM/LYMPH NODES: No ascites or free air, no fluid collection.  No enlarged or suspicious lymph nodes.   ABDOMINAL WALL: Obesity. Fatty atrophy of the posterior paraspinal musculature. Tiny fat containing umbilical hernia.   BONES: No suspicious osseous lesions are identified. Diffusely decreased bone mineralization. Sclerosis of the bilateral SI joints. Extensive lower lumbar facet arthropathy with degenerative anterolisthesis of L4 on L5 and rotatory levo scoliosis of the thoracolumbar spine.       1. Sigmoid colonic diverticulosis with mild surrounding inflammatory fat stranding concerning for acute uncomplicated sigmoid colonic diverticulitis. There is mild upstream gaseous distention of the colon with air-fluid levels that may reflect an element of ileus or colitis. No evidence to indicate bowel obstruction. 2. Severe atherosclerosis with ectasia of the infrarenal abdominal aorta measuring up to 2.9 cm in caliber. 3.  Bronchiectasis and peripheral mucous plugging within the lung bases. 4. Lumbar degenerative change. 5. Additional chronic findings as above.   MACRO: None   Signed by: Airs Malone 11/30/2024 10:42 AM Dictation workstation:   [id number]    XR chest 1 view    Result Date: 11/30/2024  Interpreted By:  Rekha Rojas, STUDY: XR CHEST 1 VIEW;  11/30/2024 7:57 am   INDICATION: Signs/Symptoms:sob.     COMPARISON: 12/08/2022   ACCESSION NUMBER(S): [id number]   ORDERING CLINICIAN: JORGE ASHBY   FINDINGS: Artifact from overlying monitoring leads. Right hilar fullness and perihilar interstitial prominence similar to the previous exam. No focal airspace consolidation or pleural effusion. The cardiac silhouette is within normal limits for size. Distended gas-filled stomach beneath the left diaphragm.       Perihilar interstitial prominence.   MACRO: None.   Signed by: Rekha Rojas 11/30/2024 8:24 AM Dictation workstation:   [id number]    Assessment/Plan   Encephalopathy-toxic metabolic versus infectious  Abnormal CT abdomen and pelvis-sigmoid diverticulitis  Bronchiectasis  Acute hypoxic respiratory failure  MRSA colonization of nares      Continue meropenem  IV ceftaroline-avoiding vancomycin  Decolonize per protocol  Monitor renal function  Coronavirus/influenza/RSV testing  Oxygen as needed  Supportive care  Monitor temperature and WBC      Jose Alberto Spence MD

## 2024-12-01 NOTE — CARE PLAN
The patient's goals for the shift include      The clinical goals for the shift include remain hds

## 2024-12-02 LAB
ALBUMIN SERPL BCP-MCNC: 2 G/DL (ref 3.4–5)
ALBUMIN SERPL BCP-MCNC: 2.1 G/DL (ref 3.4–5)
ANION GAP SERPL CALCULATED.3IONS-SCNC: 14 MMOL/L (ref 10–20)
ANION GAP SERPL CALCULATED.3IONS-SCNC: 15 MMOL/L (ref 10–20)
BACTERIA UR CULT: NORMAL
BUN SERPL-MCNC: 102 MG/DL (ref 6–23)
BUN SERPL-MCNC: 88 MG/DL (ref 6–23)
CALCIUM SERPL-MCNC: 8 MG/DL (ref 8.6–10.3)
CALCIUM SERPL-MCNC: 8.2 MG/DL (ref 8.6–10.3)
CHLORIDE SERPL-SCNC: 104 MMOL/L (ref 98–107)
CHLORIDE SERPL-SCNC: 107 MMOL/L (ref 98–107)
CO2 SERPL-SCNC: 26 MMOL/L (ref 21–32)
CO2 SERPL-SCNC: 30 MMOL/L (ref 21–32)
CREAT SERPL-MCNC: 1.54 MG/DL (ref 0.5–1.05)
CREAT SERPL-MCNC: 2 MG/DL (ref 0.5–1.05)
EGFRCR SERPLBLD CKD-EPI 2021: 25 ML/MIN/1.73M*2
EGFRCR SERPLBLD CKD-EPI 2021: 34 ML/MIN/1.73M*2
ERYTHROCYTE [DISTWIDTH] IN BLOOD BY AUTOMATED COUNT: 16.1 % (ref 11.5–14.5)
GLUCOSE SERPL-MCNC: 118 MG/DL (ref 74–99)
GLUCOSE SERPL-MCNC: 171 MG/DL (ref 74–99)
HCT VFR BLD AUTO: 30 % (ref 36–46)
HGB BLD-MCNC: 9.7 G/DL (ref 12–16)
MAGNESIUM SERPL-MCNC: 2.16 MG/DL (ref 1.6–2.4)
MCH RBC QN AUTO: 27.7 PG (ref 26–34)
MCHC RBC AUTO-ENTMCNC: 32.3 G/DL (ref 32–36)
MCV RBC AUTO: 86 FL (ref 80–100)
NRBC BLD-RTO: 0 /100 WBCS (ref 0–0)
PHOSPHATE SERPL-MCNC: 3.3 MG/DL (ref 2.5–4.9)
PHOSPHATE SERPL-MCNC: 4.2 MG/DL (ref 2.5–4.9)
PLATELET # BLD AUTO: 451 X10*3/UL (ref 150–450)
POTASSIUM SERPL-SCNC: 2.9 MMOL/L (ref 3.5–5.3)
POTASSIUM SERPL-SCNC: 3.9 MMOL/L (ref 3.5–5.3)
RBC # BLD AUTO: 3.5 X10*6/UL (ref 4–5.2)
SODIUM SERPL-SCNC: 142 MMOL/L (ref 136–145)
SODIUM SERPL-SCNC: 147 MMOL/L (ref 136–145)
VANCOMYCIN SERPL-MCNC: 18.2 UG/ML (ref 5–20)
WBC # BLD AUTO: 17.2 X10*3/UL (ref 4.4–11.3)

## 2024-12-02 PROCEDURE — 2500000001 HC RX 250 WO HCPCS SELF ADMINISTERED DRUGS (ALT 637 FOR MEDICARE OP): Performed by: INTERNAL MEDICINE

## 2024-12-02 PROCEDURE — 80069 RENAL FUNCTION PANEL: CPT | Performed by: INTERNAL MEDICINE

## 2024-12-02 PROCEDURE — 83735 ASSAY OF MAGNESIUM: CPT | Performed by: INTERNAL MEDICINE

## 2024-12-02 PROCEDURE — 2500000004 HC RX 250 GENERAL PHARMACY W/ HCPCS (ALT 636 FOR OP/ED): Performed by: INTERNAL MEDICINE

## 2024-12-02 PROCEDURE — 87205 SMEAR GRAM STAIN: CPT | Mod: TRILAB | Performed by: INTERNAL MEDICINE

## 2024-12-02 PROCEDURE — 2500000002 HC RX 250 W HCPCS SELF ADMINISTERED DRUGS (ALT 637 FOR MEDICARE OP, ALT 636 FOR OP/ED): Performed by: INTERNAL MEDICINE

## 2024-12-02 PROCEDURE — 85027 COMPLETE CBC AUTOMATED: CPT | Performed by: INTERNAL MEDICINE

## 2024-12-02 PROCEDURE — 94640 AIRWAY INHALATION TREATMENT: CPT

## 2024-12-02 PROCEDURE — 92610 EVALUATE SWALLOWING FUNCTION: CPT | Mod: GN

## 2024-12-02 PROCEDURE — 87077 CULTURE AEROBIC IDENTIFY: CPT | Mod: TRILAB | Performed by: INTERNAL MEDICINE

## 2024-12-02 PROCEDURE — 36415 COLL VENOUS BLD VENIPUNCTURE: CPT | Performed by: INTERNAL MEDICINE

## 2024-12-02 PROCEDURE — 97165 OT EVAL LOW COMPLEX 30 MIN: CPT | Mod: GO

## 2024-12-02 PROCEDURE — 2060000001 HC INTERMEDIATE ICU ROOM DAILY

## 2024-12-02 PROCEDURE — 2500000005 HC RX 250 GENERAL PHARMACY W/O HCPCS: Performed by: INTERNAL MEDICINE

## 2024-12-02 PROCEDURE — 80202 ASSAY OF VANCOMYCIN: CPT

## 2024-12-02 PROCEDURE — 2500000004 HC RX 250 GENERAL PHARMACY W/ HCPCS (ALT 636 FOR OP/ED)

## 2024-12-02 RX ORDER — SODIUM CHLORIDE 450 MG/100ML
75 INJECTION, SOLUTION INTRAVENOUS CONTINUOUS
Status: DISCONTINUED | OUTPATIENT
Start: 2024-12-02 | End: 2024-12-03

## 2024-12-02 RX ORDER — CLOTRIMAZOLE 10 MG/1
10 LOZENGE ORAL
Status: DISCONTINUED | OUTPATIENT
Start: 2024-12-02 | End: 2024-12-06 | Stop reason: HOSPADM

## 2024-12-02 RX ORDER — POTASSIUM CHLORIDE 14.9 MG/ML
20 INJECTION INTRAVENOUS
Status: COMPLETED | OUTPATIENT
Start: 2024-12-02 | End: 2024-12-02

## 2024-12-02 RX ORDER — SODIUM CHLORIDE AND POTASSIUM CHLORIDE 150; 900 MG/100ML; MG/100ML
75 INJECTION, SOLUTION INTRAVENOUS CONTINUOUS
Status: DISCONTINUED | OUTPATIENT
Start: 2024-12-02 | End: 2024-12-02

## 2024-12-02 RX ORDER — CHLORHEXIDINE GLUCONATE ORAL RINSE 1.2 MG/ML
15 SOLUTION DENTAL 2 TIMES DAILY
Status: DISCONTINUED | OUTPATIENT
Start: 2024-12-02 | End: 2024-12-06 | Stop reason: HOSPADM

## 2024-12-02 SDOH — ECONOMIC STABILITY: FOOD INSECURITY: WITHIN THE PAST 12 MONTHS, YOU WORRIED THAT YOUR FOOD WOULD RUN OUT BEFORE YOU GOT THE MONEY TO BUY MORE.: NEVER TRUE

## 2024-12-02 SDOH — SOCIAL STABILITY: SOCIAL INSECURITY
WITHIN THE LAST YEAR, HAVE YOU BEEN RAPED OR FORCED TO HAVE ANY KIND OF SEXUAL ACTIVITY BY YOUR PARTNER OR EX-PARTNER?: NO

## 2024-12-02 SDOH — ECONOMIC STABILITY: HOUSING INSECURITY: AT ANY TIME IN THE PAST 12 MONTHS, WERE YOU HOMELESS OR LIVING IN A SHELTER (INCLUDING NOW)?: NO

## 2024-12-02 SDOH — SOCIAL STABILITY: SOCIAL INSECURITY: WITHIN THE LAST YEAR, HAVE YOU BEEN AFRAID OF YOUR PARTNER OR EX-PARTNER?: NO

## 2024-12-02 SDOH — HEALTH STABILITY: PHYSICAL HEALTH
HOW OFTEN DO YOU NEED TO HAVE SOMEONE HELP YOU WHEN YOU READ INSTRUCTIONS, PAMPHLETS, OR OTHER WRITTEN MATERIAL FROM YOUR DOCTOR OR PHARMACY?: ALWAYS

## 2024-12-02 SDOH — ECONOMIC STABILITY: HOUSING INSECURITY: IN THE LAST 12 MONTHS, WAS THERE A TIME WHEN YOU WERE NOT ABLE TO PAY THE MORTGAGE OR RENT ON TIME?: NO

## 2024-12-02 SDOH — SOCIAL STABILITY: SOCIAL INSECURITY: WITHIN THE LAST YEAR, HAVE YOU BEEN HUMILIATED OR EMOTIONALLY ABUSED IN OTHER WAYS BY YOUR PARTNER OR EX-PARTNER?: NO

## 2024-12-02 SDOH — ECONOMIC STABILITY: INCOME INSECURITY: IN THE PAST 12 MONTHS HAS THE ELECTRIC, GAS, OIL, OR WATER COMPANY THREATENED TO SHUT OFF SERVICES IN YOUR HOME?: NO

## 2024-12-02 SDOH — ECONOMIC STABILITY: FOOD INSECURITY: WITHIN THE PAST 12 MONTHS, THE FOOD YOU BOUGHT JUST DIDN'T LAST AND YOU DIDN'T HAVE MONEY TO GET MORE.: NEVER TRUE

## 2024-12-02 SDOH — ECONOMIC STABILITY: TRANSPORTATION INSECURITY: IN THE PAST 12 MONTHS, HAS LACK OF TRANSPORTATION KEPT YOU FROM MEDICAL APPOINTMENTS OR FROM GETTING MEDICATIONS?: NO

## 2024-12-02 SDOH — ECONOMIC STABILITY: FOOD INSECURITY: HOW HARD IS IT FOR YOU TO PAY FOR THE VERY BASICS LIKE FOOD, HOUSING, MEDICAL CARE, AND HEATING?: NOT HARD AT ALL

## 2024-12-02 SDOH — ECONOMIC STABILITY: HOUSING INSECURITY: IN THE PAST 12 MONTHS, HOW MANY TIMES HAVE YOU MOVED WHERE YOU WERE LIVING?: 0

## 2024-12-02 ASSESSMENT — ACTIVITIES OF DAILY LIVING (ADL)
LACK_OF_TRANSPORTATION: NO
LACK_OF_TRANSPORTATION: NO
BATHING_ASSISTANCE: TOTAL

## 2024-12-02 ASSESSMENT — COGNITIVE AND FUNCTIONAL STATUS - GENERAL
HELP NEEDED FOR BATHING: TOTAL
HELP NEEDED FOR BATHING: TOTAL
CLIMB 3 TO 5 STEPS WITH RAILING: TOTAL
WALKING IN HOSPITAL ROOM: TOTAL
DRESSING REGULAR UPPER BODY CLOTHING: TOTAL
EATING MEALS: A LOT
TOILETING: TOTAL
DAILY ACTIVITIY SCORE: 6
MOVING FROM LYING ON BACK TO SITTING ON SIDE OF FLAT BED WITH BEDRAILS: TOTAL
STANDING UP FROM CHAIR USING ARMS: TOTAL
PERSONAL GROOMING: TOTAL
EATING MEALS: TOTAL
DRESSING REGULAR LOWER BODY CLOTHING: TOTAL
DRESSING REGULAR UPPER BODY CLOTHING: TOTAL
MOBILITY SCORE: 6
MOVING TO AND FROM BED TO CHAIR: TOTAL
MOVING TO AND FROM BED TO CHAIR: TOTAL
MOVING FROM LYING ON BACK TO SITTING ON SIDE OF FLAT BED WITH BEDRAILS: TOTAL
TOILETING: TOTAL
TOILETING: TOTAL
MOBILITY SCORE: 6
DRESSING REGULAR LOWER BODY CLOTHING: TOTAL
TURNING FROM BACK TO SIDE WHILE IN FLAT BAD: TOTAL
EATING MEALS: TOTAL
PERSONAL GROOMING: TOTAL
TURNING FROM BACK TO SIDE WHILE IN FLAT BAD: TOTAL
STANDING UP FROM CHAIR USING ARMS: TOTAL
DRESSING REGULAR LOWER BODY CLOTHING: TOTAL
CLIMB 3 TO 5 STEPS WITH RAILING: TOTAL
DAILY ACTIVITIY SCORE: 6
HELP NEEDED FOR BATHING: TOTAL
DRESSING REGULAR UPPER BODY CLOTHING: TOTAL
DAILY ACTIVITIY SCORE: 7
PERSONAL GROOMING: TOTAL
WALKING IN HOSPITAL ROOM: TOTAL

## 2024-12-02 ASSESSMENT — PAIN SCALES - GENERAL
PAINLEVEL_OUTOF10: 0 - NO PAIN
PAINLEVEL_OUTOF10: 0 - NO PAIN
PAINLEVEL_OUTOF10: 9
PAINLEVEL_OUTOF10: 3

## 2024-12-02 ASSESSMENT — PAIN SCALES - WONG BAKER
WONGBAKER_NUMERICALRESPONSE: HURTS LITTLE BIT
WONGBAKER_NUMERICALRESPONSE: NO HURT

## 2024-12-02 NOTE — CONSULTS
"Nutrition Assessement Note    Nutrition Assessment    Reason for Assessment: Admission nursing screening for pressure injury and possible wt loss. Pt currently NPO    Reason for Hospital Admission:  Amrita Lopez is a 84 y.o. female who is admitted for SOB. Possible transfer to [place] for HD catheter placement and new HD    Malnutrition Screening Tool (MST)  Have you recently lost weight without trying?: Unsure  If yes, how much weight have you lost?: Unsure  Weight Loss Score: 2  Have you been eating poorly because of a decreased appetite?: No  Malnutrition Score: 2  Nutrition Screen  Stage 3 or 4 Pressure Injury or Multiple Non-Healing Wounds: Yes (Comment)  Home Tube Feeding or Total Parenteral Nutrition (TPN): No  Dietitian Consult Needed: Yes (Comment)    Past Medical History:   Diagnosis Date    Chronic bronchiolitis (Multi)     Hyperlipidemia     Nicotine dependence     Stroke (Multi)     Vitamin B12 deficiency       Past Surgical History:   Procedure Laterality Date    ADENOIDECTOMY      MR HEAD ANGIO WO IV CONTRAST  11/03/2020    MR HEAD ANGIO WO IV CONTRAST [facility]    MR HEAD ANGIO WO IV CONTRAST  03/29/2021    MR HEAD ANGIO WO IV CONTRAST [facility]    MR NECK ANGIO WO IV CONTRAST  03/30/2021    MR NECK ANGIO WO IV CONTRAST [facility]    TONSILLECTOMY         Nutrition History:     Energy Intake: Poor < 50 %     Food Allergies/Intolerances:  None  GI Symptoms: None  Oral Problems: None    Anthropometrics:  Ht: 162.6 cm (5' 4\"), Wt: 118 kg (259 lb 4.2 oz) (Bed may be incorrect. Wt displayed is 117.6kg.), BMI: 44.48  IBW/kg (Dietitian Calculated): 54.5 kg  Percent of IBW: 216 %  Adjusted Body Weight (kg): 70 kg    Weight Change:  Daily Weight  12/01/24 : 118 kg (259 lb 4.2 oz)  05/21/24 : 133 kg (293 lb 6.9 oz)  05/17/24 : 138 kg (304 lb)  01/24/24 : 136 kg (300 lb 12.8 oz)  10/24/23 : 132 kg (290 lb)  01/25/23 : 123 kg (270 lb 9.6 oz)  10/04/21 : 116 kg (254 lb 12.8 " oz)  07/09/21 : 108 kg (237 lb)     Weight History / % Weight Change: 5/24/24 wt documented at 293#, indicating a 34# (11%) wt loss in 6 months. Unsure of accuracy of wt's, 2 different wt's listed 311# and 259#           Nutrition Focused Physical Exam Findings: mostly visual, pt is lethargic     Muscle Wasting  Pectoralis (Clavicular Region): Well nourished (clavicle not visible)  Deltoid/Trapezius: Well nourished (rounded appearance at arm, shoulder, neck)     Physical Findings (Nutrition Deficiency/Toxicity)  Skin: Positive    Nutrition Significant Labs:  Lab Results   Component Value Date    WBC 17.2 (H) 12/02/2024    HGB 9.7 (L) 12/02/2024    HCT 30.0 (L) 12/02/2024     (H) 12/02/2024    CHOL 176 03/28/2021    TRIG 99 03/28/2021    HDL 64 03/28/2021    ALT 19 12/01/2024    AST 23 12/01/2024     12/02/2024    K 2.9 (LL) 12/02/2024     12/02/2024    CREATININE 2.00 (H) 12/02/2024     (HH) 12/02/2024    CO2 26 12/02/2024    TSH 1.92 03/28/2021    INR 1.0 06/08/2021    HGBA1C 5.8 03/28/2021     Nutrition Specific Medications:  ammonium lactate, 1 Application, Topical, BID  aspirin, 81 mg, oral, Daily  atorvastatin, 10 mg, oral, Nightly  ceftaroline, 300 mg, intravenous, q12h  cetirizine, 10 mg, oral, Daily  chlorhexidine, 15 mL, Mouth/Throat, BID  cholecalciferol, 5,000 Units, oral, Daily  clopidogrel, 75 mg, oral, Daily  clotrimazole, 10 mg, oral, 5x daily  cyanocobalamin, 500 mcg, oral, Daily  heparin (porcine), 7,500 Units, subcutaneous, q8h ANNIE  ipratropium-albuteroL, 3 mL, nebulization, TID  meropenem, 1 g, intravenous, q12h  mupirocin, , Topical, BID  nystatin, 1 Application, Topical, BID  oxygen, , inhalation, q8h  pantoprazole, 40 mg, oral, Daily before breakfast  potassium chloride, 20 mEq, intravenous, q2h  sennosides, 2 tablet, oral, BID  [Held by provider] topiramate, 50 mg, oral, Daily      potassium chloride in 0.9%NaCl, 75 mL/hr      Dietary Orders (From admission, onward)        Start     Ordered    12/02/24 0917  NPO Diet; Effective now  Diet effective now         12/02/24 0916    11/30/24 1534  May Participate in Room Service  ( ROOM SERVICE MAY PARTICIPATE)  Once        Question:  .  Answer:  Yes    11/30/24 1533                     Estimated Needs:   Estimated Energy Needs  Total Energy Estimated Needs (kCal):  (9848-5797)  Total Estimated Energy Need per Day (kCal/kg):  (25-30)  Method for Estimating Needs: adj wt    Estimated Protein Needs  Total Protein Estimated Needs (g): 88 g  Total Protein Estimated Needs (g/kg): 1.25 g/kg  Method for Estimating Needs: for wounds using adj wt    Estimated Fluid Needs  Method for Estimating Needs: UO + 500ml      Nutrition Diagnosis   Nutrition Diagnosis:  Malnutrition Diagnosis  Patient has Malnutrition Diagnosis: No    Nutrition Diagnosis  Patient has Nutrition Diagnosis: Yes  Diagnosis Status (1): New  Nutrition Diagnosis 1: Increased nutrient needs  Related to (1): increased demand for nutrients  As Evidenced by (1): wounds on buttocks  Additional Nutrition Diagnosis: Diagnosis 2  Diagnosis Status (2): New  Nutrition Diagnosis 2: Inadequate oral intake  Related to (2): decreased ability to consume sufficient energy  As Evidenced by (2): poor po intake     Nutrition Interventions/Recommendations   Nutrition Interventions and Recommendations:    Nutrition Prescription:  Individualized Nutrition Prescription Provided for : 4480-7178 calories 88gm protein    Nutrition Interventions:   Food and/or Nutrient Delivery Interventions  Interventions: Meals and snacks  Meals and Snacks: Mineral-modified diet  Goal: resume renal diet    Education Documentation  No documentation found.       Nutrition Monitoring and Evaluation   Monitoring/Evaluation:   Food/Nutrient Related History Monitoring  Monitoring and Evaluation Plan: Energy intake  Energy Intake: Estimated energy intake  Criteria: Pt to consume >/=75% estimated energy needs    Body  Composition/Growth/Weight History  Monitoring and Evaluation Plan: Weight  Weight: Measured weight  Criteria: Pt will maintain wt at this time        Time Spent/Follow-up:   Follow Up  Time Spent (min): 30 minutes  Last Date of Nutrition Visit: 12/02/24  Nutrition Follow-Up Needed?: 3-5 days  Follow up Comment: 12/6/24

## 2024-12-02 NOTE — CONSULTS
Wound Care Consult     Visit Date: 12/2/2024      Patient Name: Amrita Lopez         MRN: [id number]           YOB: 1940    Consulted for wound care. A 84 y.o. female patient admitted for   Shortness of breath [R06.02]  Diverticulitis [K57.92]  Leukocytosis, unspecified type [D72.829]   Past Medical History:   Diagnosis Date    Chronic bronchiolitis (Multi)     Hyperlipidemia     Nicotine dependence     Stroke (Multi)     Vitamin B12 deficiency       Past Surgical History:   Procedure Laterality Date    ADENOIDECTOMY      MR HEAD ANGIO WO IV CONTRAST  11/03/2020    MR HEAD ANGIO WO IV CONTRAST [facility]    MR HEAD ANGIO WO IV CONTRAST  03/29/2021    MR HEAD ANGIO WO IV CONTRAST [facility]    MR NECK ANGIO WO IV CONTRAST  03/30/2021    MR NECK ANGIO WO IV CONTRAST [facility]    TONSILLECTOMY        Scheduled medications  ammonium lactate, 1 Application, Topical, BID  aspirin, 81 mg, oral, Daily  atorvastatin, 10 mg, oral, Nightly  ceftaroline, 300 mg, intravenous, q12h  cetirizine, 10 mg, oral, Daily  chlorhexidine, 15 mL, Mouth/Throat, BID  cholecalciferol, 5,000 Units, oral, Daily  clopidogrel, 75 mg, oral, Daily  clotrimazole, 10 mg, oral, 5x daily  cyanocobalamin, 500 mcg, oral, Daily  heparin (porcine), 7,500 Units, subcutaneous, q8h ANNIE  ipratropium-albuteroL, 3 mL, nebulization, TID  meropenem, 1 g, intravenous, q12h  mupirocin, , Topical, BID  nystatin, 1 Application, Topical, BID  oxygen, , inhalation, q8h  pantoprazole, 40 mg, oral, Daily before breakfast  potassium chloride, 20 mEq, intravenous, q2h  sennosides, 2 tablet, oral, BID  [Held by provider] topiramate, 50 mg, oral, Daily      Continuous medications     PRN medications  PRN medications: acetaminophen **OR** acetaminophen **OR** acetaminophen, albuterol, benzocaine-menthol, dextromethorphan-guaifenesin, guaiFENesin, loperamide, menthol-zinc oxide, ondansetron ODT, polyethylene glycol, vancomycin      Allergies   Allergen Reactions    Nickel Hives        No results found for the last 90 days.         Pertinent Labs:   Albumin   Date Value Ref Range Status   12/02/2024 2.0 (L) 3.4 - 5.0 g/dL Final       Wound Assessment:  Wound 10/24/23 Other (comment) Pretibial Left;Lower;Lateral (Active)   Wound Image   11/30/24 1317   Site Assessment Red 12/02/24 1100   Wound Length (cm) 4 cm 12/02/24 1100   Wound Width (cm) 5 cm 12/02/24 1100   Wound Surface Area (cm^2) 20 cm^2 12/02/24 1100   Wound Depth (cm) 0.1 cm 12/02/24 1100   Wound Volume (cm^3) 2 cm^3 12/02/24 1100   Drainage Description Serous 12/02/24 1100   Drainage Amount Small 12/02/24 1100   Dressing Xeroform;Gauze;Kerlix/rolled gauze 12/02/24 1100   Dressing Changed Changed 12/02/24 1100       Wound 05/21/24 Moisture Associated Skin Damage Buttocks Left (Active)   Wound Image    11/30/24 1325   Site Assessment Red 12/02/24 1100   [place]-Wound Assessment Pink 12/01/24 0400   Drainage Description Serosanguineous 12/02/24 1100   Drainage Amount Small 12/02/24 1100   Dressing Other (Comment) 12/02/24 1100       Wound 05/21/24 Moisture Associated Skin Damage Buttocks Right (Active)   Wound Image   11/30/24 1336   Site Assessment Red 12/02/24 1100   [place]-Wound Assessment Pink 12/01/24 0400   Drainage Description Serosanguineous 12/02/24 1100   Drainage Amount Small 12/02/24 1100   Dressing Other (Comment) 12/02/24 1100       Wound 11/30/24 Sternum Left;Upper (Active)   Wound Image   11/30/24 1324   Wound Length (cm) 1.5 cm 12/02/24 1100   Wound Width (cm) 1 cm 12/02/24 1100   Wound Surface Area (cm^2) 1.5 cm^2 12/02/24 1100   Drainage Description None 12/01/24 0400   Drainage Amount None 12/01/24 0400   Dressing Foam 12/02/24 1100   Dressing Status Clean;Dry;Occlusive 12/01/24 0400       Wound 11/30/24 Breast Lateral;Lower;Right (Active)   Wound Image   11/30/24 1356   Wound Length (cm) 0.5 cm 12/02/24 1100   Wound Width (cm) 1 cm 12/02/24 1100   Wound Surface Area  (cm^2) 0.5 cm^2 12/02/24 1100   Wound Depth (cm) 0.4 cm 12/02/24 1100   Wound Volume (cm^3) 0.2 cm^3 12/02/24 1100   Drainage Description None 12/01/24 0400   Drainage Amount None 12/01/24 0400   Dressing Open to air 12/02/24 1100     Buttock and posterior thigh full thickness excoriation noted on exam see photo below    Reason for Consult: Consulted for wound evaluation and recommendations.     Wound History: Wounds photographed by nursing on admission. Patient was under the care of hospice at time of admission.     Wound Team Assessment:  Patient has martinez catheter in place at time of exam, patient reluctant to turn due to discomfort. Three assist to turn and clean including bedside nurse. Patient is incontinent of liquid to soft stool, and multiple reports of stooling in the last 24 hours per nursing. Full thickness excoriation to bilateral buttocks and posterior thighs, now photo taken see above. Fecal management device may be of benefit if stool continues to be liquid. Patient was cleaned, new photo taken and triad applied to buttock and upper thigh wounds.      Left calf wound draining, mepilex dressing removed, cleaned with soap/water, rinsed, patted dry, applied xeroform, 4x4 and kerlix at time of visit and recommend daily dressing change.     See wound assessments above. Bilateral heels are pink, intact with heel protectors and offloaded with pillows.     Recommendations:     Excoriation of buttock and posterior thighs, keep areas clean dry and Triad applied 3 times a day and with incontinence.   Breast wound, continue nystatin as ordered, Apply Triad to wound bed when nystatin is applied.  Sternum wound, clean wound with soap/water, rinse, pat dry apply Triad to wound bed 3 times a day and cover as needed with mepilex to maintain drainage.    Patient is on a [facility] bed frame with [employer] Mattress waffle mattress in place, inflated after cleaning patient of incontinence. Dr. Sanchez notified of  recommendations per [facility] Secure chat and he is to place orders. Livia Contreras RN bedside nurse updated, continue pressure injury prevention interventions and nursing to continue to follow provider orders. Re consult wound RN PRN.    Chelsea Jung BSN RN [facility]  12/2/2024  12:02 PM

## 2024-12-02 NOTE — PROGRESS NOTES
Amrita Lopez is a 84 y.o. female on day 2 of admission presenting with Shortness of breath.    Subjective   The patient was seen and examined lying in the bed comfortably.  Did not look in acute distress.  The patient was denied any headache or dizziness no nausea or vomiting .  No fever chills or rigors.    The patient  has cough with scanty whitish yellowish expectoration.       Objective     Physical Exam  HEENT:  Head externally atraumatic,  extraocular movements intact, oral mucosa moist  Neck:  Supple, no JVP, no palpable adenopathy or thyromegaly.  No carotid bruit.  Chest:  Clear to auscultation and resonant.  Heart:  Regular rate and rhythm, no murmur or gallop could be appreciated.  Abdomen:  Soft, nontender, bowel sounds present, normoactive, no palpable hepatosplenomegaly.  Extremities:  No edema, pulses present, no cyanosis or clubbing.  CNS:  Patient alert, oriented to time, place and person.    No new deficit.  Cranial nerves 2-12 grossly intact  Skin:  No active rash.  Musculoskeletal:  No  apparent joint swelling or erythema, range of movement normal.  Last Recorded Vitals  Heart Rate:  [82]   Temp:  [36.1 °C (97 °F)-36.8 °C (98.2 °F)]   Resp:  [15-18]   BP: (115-156)/(48-54)   Weight:  [118 kg (259 lb 4.2 oz)]   SpO2:  [93 %-98 %]     Intake/Output last 3 Shifts:  I/O last 3 completed shifts:  In: 1940 (16.5 mL/kg) [I.V.:1790 (15.2 mL/kg); IV Piggyback:150]  Out: 400 (3.4 mL/kg) [Urine:400 (0.1 mL/kg/hr)]  Weight: 117.6 kg     Relevant Results  No results found for the last 90 days.    Results for orders placed or performed during the hospital encounter of 11/30/24 (from the past 24 hours)   Comprehensive metabolic panel   Result Value Ref Range    Glucose 122 (H) 74 - 99 mg/dL    Sodium 140 136 - 145 mmol/L    Potassium 3.7 3.5 - 5.3 mmol/L    Chloride 106 98 - 107 mmol/L    Bicarbonate 14 (L) 21 - 32 mmol/L    Anion Gap 24 (H) 10 - 20 mmol/L    Urea Nitrogen 106 (HH) 6 - 23 mg/dL    Creatinine  2.56 (H) 0.50 - 1.05 mg/dL    eGFR 18 (L) >60 mL/min/1.73m*2    Calcium 8.1 (L) 8.6 - 10.3 mg/dL    Albumin 2.0 (L) 3.4 - 5.0 g/dL    Alkaline Phosphatase 81 33 - 136 U/L    Total Protein 4.4 (L) 6.4 - 8.2 g/dL    AST 23 9 - 39 U/L    Bilirubin, Total 0.6 0.0 - 1.2 mg/dL    ALT 19 7 - 45 U/L   CBC   Result Value Ref Range    WBC 27.7 (H) 4.4 - 11.3 x10*3/uL    nRBC 0.0 0.0 - 0.0 /100 WBCs    RBC 3.89 (L) 4.00 - 5.20 x10*6/uL    Hemoglobin 10.7 (L) 12.0 - 16.0 g/dL    Hematocrit 35.0 (L) 36.0 - 46.0 %    MCV 90 80 - 100 fL    MCH 27.5 26.0 - 34.0 pg    MCHC 30.6 (L) 32.0 - 36.0 g/dL    RDW 16.6 (H) 11.5 - 14.5 %    Platelets 424 150 - 450 x10*3/uL   Vancomycin   Result Value Ref Range    Vancomycin 21.5 (H) 5.0 - 20.0 ug/mL   BLOOD GAS LACTIC ACID, VENOUS   Result Value Ref Range    POCT Lactate, Venous 3.4 (H) 0.4 - 2.0 mmol/L   RSV PCR   Result Value Ref Range    RSV PCR Not Detected Not Detected   Influenza A, and B PCR   Result Value Ref Range    Flu A Result Not Detected Not Detected    Flu B Result Not Detected Not Detected   Sars-CoV-2 PCR   Result Value Ref Range    Coronavirus 2019, PCR Not Detected Not Detected        Current Facility-Administered Medications:     acetaminophen (Tylenol) tablet 650 mg, 650 mg, oral, q4h PRN **OR** acetaminophen (Tylenol) oral liquid 650 mg, 650 mg, oral, q4h PRN **OR** acetaminophen (Tylenol) suppository 650 mg, 650 mg, rectal, q4h PRN, Arnoldo Sanchez MD    albuterol 2.5 mg /3 mL (0.083 %) nebulizer solution 2.5 mg, 2.5 mg, nebulization, q2h PRN, Arnoldo Sanchez MD    ammonium lactate (Lac-Hydrin) 12 % lotion 1 Application, 1 Application, Topical, BID, Arnoldo Sanchez MD, 1 Application at 12/01/24 2132    aspirin chewable tablet 81 mg, 81 mg, oral, Daily, Arnoldo Sanchez MD    atorvastatin (Lipitor) tablet 10 mg, 10 mg, oral, Nightly, Arnoldo Sanchez MD    benzocaine-menthol (Cepastat Sore Throat) lozenge 1 lozenge, 1 lozenge, Mouth/Throat, q2h PRN,  Arnoldo Sanchez MD    ceftaroline (Teflaro) 300 mg in dextrose 5% 50 mL IV, 300 mg, intravenous, q12h, Jose Alberto Spence MD, Stopped at 12/01/24 2041    cetirizine (ZyrTEC) tablet 10 mg, 10 mg, oral, Daily, Arnoldo Sanchez MD    cholecalciferol (Vitamin D-3) tablet 5,000 Units, 5,000 Units, oral, Daily, Arnoldo Sanchez MD    clopidogrel (Plavix) tablet 75 mg, 75 mg, oral, Daily, Arnoldo Sanchez MD    cyanocobalamin (Vitamin B-12) tablet 500 mcg, 500 mcg, oral, Daily, Arnoldo Sanchez MD    dextromethorphan-guaifenesin (Robitussin DM)  mg/5 mL oral liquid 5 mL, 5 mL, oral, q4h PRN, Arnoldo Sanchez MD    dextrose 5% 1,000 mL with potassium chloride 20 mEq, sodium bicarbonate 1 mEq/mL (8.4 %) 150 mEq infusion, 100 mL/hr, intravenous, Continuous, Meaghan Choe MD, Last Rate: 100 mL/hr at 12/02/24 0020, 100 mL/hr at 12/02/24 0020    guaiFENesin (Mucinex) 12 hr tablet 600 mg, 600 mg, oral, q12h PRN, Arnoldo Sanchez MD    heparin (porcine) injection 7,500 Units, 7,500 Units, subcutaneous, q8h ANNIE, Arnoldo Sanchez MD, 7,500 Units at 12/01/24 2259    ipratropium-albuteroL (Duo-Neb) 0.5-2.5 mg/3 mL nebulizer solution 3 mL, 3 mL, nebulization, TID, Arnoldo Sanchez MD, 3 mL at 12/1940    loperamide (Imodium) capsule 2 mg, 2 mg, oral, 4x daily PRN, Arnoldo Sanchez MD    menthol-zinc oxide (Calmoseptine - Risamine) 0.44-20.6 % ointment, , Topical, 4x daily PRN, Arnoldo Sanchez MD    meropenem (Merrem) 1 g in sodium chloride 0.9% IV 50 mL, 1 g, intravenous, q12h, Arnoldo Sanchez MD, Stopped at 12/01/24 1501    mupirocin (Bactroban) 2 % ointment, , Topical, BID, Jose Alberto Spence MD, Given at 12/01/24 2051    nystatin (Mycostatin) 100,000 unit/gram powder 1 Application, 1 Application, Topical, BID, Arnoldo Sanchez MD, 1 Application at 12/01/24 2051    ondansetron ODT (Zofran-ODT) disintegrating tablet 4 mg, 4 mg, oral, q8h PRN, Arnoldo Sanchez MD     pantoprazole (ProtoNix) EC tablet 40 mg, 40 mg, oral, Daily before breakfast, Arnoldo Sanchez MD    polyethylene glycol (Glycolax, Miralax) packet 17 g, 17 g, oral, Daily PRN, Arnoldo Sanchez MD    sennosides (Senokot) tablet 17.2 mg, 2 tablet, oral, BID, Arnoldo Sanchez MD    [Held by provider] topiramate (Topamax) tablet 50 mg, 50 mg, oral, Daily, Arnoldo Sanchez MD    vancomycin (Vancocin) pharmacy to dose - pharmacy monitoring, , miscellaneous, Daily PRN, Arnoldo Sanchez MD   Assessment/Plan   Principal Problem:    Shortness of breath   Acute renal failure   Sepsis  Urinary tract infection  Metabolic acidosis   Obesity   Acute diverticulitis  Respiratory failure   Dehydration    Plan Continue current medication.  Give supportive care.  Give physical therapy occupation therapy platelets clostridium difficile.  The patient was broad-spectrum IV antibiotics. ,  katt Giles, resttarik positive.  Patient was  switched over to ceftaroline by ID due to renal failure.  We will take deep vein thrombosis, fall, aspiration, decubitus, and deep vein thrombosis precautions this has been discussed with the patient was agreeable to it.  Discussed with the   Nephrology mustard the patient was transferred [facility] for possible dialysis.  Request has been   Put  to transfer the patient  new [facility] for possible dialysis. Patient can not hemodialysis done at  [facility] because services report dialysis catheter are not available at [facility] per nephrologists.         Arnoldo Sanchez MD

## 2024-12-02 NOTE — PROGRESS NOTES
Amrita Lopez is a 84 y.o. female on day 2 of admission presenting with Shortness of breath.    Subjective   The patient was seen and examined.  Lying in the bed.  Comfortable.  Not in acute distress.  Patient denies any headache, dizziness no nausea or vomiting.  No fever chills or rigor.  Patient is more alert as compared to yesterday.  Failed swallow evaluation.  Patient has oral ulcers.       Objective     Physical Exam  HEENT:  Head externally atraumatic,  extraocular movements intact, oral mucosa dry and oral ulcers present.  Oral thrush present.    Neck:  Supple, no JVP, no palpable adenopathy or thyromegaly.  No carotid bruit.  Chest:  Clear to auscultation and resonant.  Heart:  Regular rate and rhythm, no murmur or gallop could be appreciated.  Abdomen:  Soft, nontender, bowel sounds present, normoactive, no palpable hepatosplenomegaly.  Extremities:  No edema, pulses present, no cyanosis or clubbing.  CNS:  Patient alert, oriented to time, place and person.    No new deficit.  Cranial nerves 2-12 grossly intact  Skin: Patient has stage II decubitus ulcers on the buttocks and also 1 on the sternal area.  Musculoskeletal:  No  apparent joint swelling or erythema, range of movement normal.  Last Recorded Vitals  Heart Rate:  [81-87]   Temp:  [36.1 °C (97 °F)-36.5 °C (97.7 °F)]   Resp:  [15]   BP: (106-134)/(48-79)   SpO2:  [93 %-97 %]     Intake/Output last 3 Shifts:  I/O last 3 completed shifts:  In: 3000 (25.5 mL/kg) [I.V.:2790 (23.7 mL/kg); IV Piggyback:210]  Out: 950 (8.1 mL/kg) [Urine:950 (0.2 mL/kg/hr)]  Weight: 117.6 kg     Relevant Results  No results found for the last 90 days.    Results for orders placed or performed during the hospital encounter of 11/30/24 (from the past 24 hours)   CBC   Result Value Ref Range    WBC 17.2 (H) 4.4 - 11.3 x10*3/uL    nRBC 0.0 0.0 - 0.0 /100 WBCs    RBC 3.50 (L) 4.00 - 5.20 x10*6/uL    Hemoglobin 9.7 (L) 12.0 - 16.0 g/dL    Hematocrit 30.0 (L) 36.0 - 46.0 %     MCV 86 80 - 100 fL    MCH 27.7 26.0 - 34.0 pg    MCHC 32.3 32.0 - 36.0 g/dL    RDW 16.1 (H) 11.5 - 14.5 %    Platelets 451 (H) 150 - 450 x10*3/uL   Renal Function Panel   Result Value Ref Range    Glucose 171 (H) 74 - 99 mg/dL    Sodium 142 136 - 145 mmol/L    Potassium 2.9 (LL) 3.5 - 5.3 mmol/L    Chloride 104 98 - 107 mmol/L    Bicarbonate 26 21 - 32 mmol/L    Anion Gap 15 10 - 20 mmol/L    Urea Nitrogen 102 (HH) 6 - 23 mg/dL    Creatinine 2.00 (H) 0.50 - 1.05 mg/dL    eGFR 25 (L) >60 mL/min/1.73m*2    Calcium 8.0 (L) 8.6 - 10.3 mg/dL    Phosphorus 4.2 2.5 - 4.9 mg/dL    Albumin 2.0 (L) 3.4 - 5.0 g/dL   Vancomycin   Result Value Ref Range    Vancomycin 18.2 5.0 - 20.0 ug/mL   Magnesium   Result Value Ref Range    Magnesium 2.16 1.60 - 2.40 mg/dL        Current Facility-Administered Medications:     acetaminophen (Tylenol) tablet 650 mg, 650 mg, oral, q4h PRN **OR** acetaminophen (Tylenol) oral liquid 650 mg, 650 mg, oral, q4h PRN, 650 mg at 12/02/24 1135 **OR** acetaminophen (Tylenol) suppository 650 mg, 650 mg, rectal, q4h PRN, Arnoldo Sanchez MD    albuterol 2.5 mg /3 mL (0.083 %) nebulizer solution 2.5 mg, 2.5 mg, nebulization, q2h PRN, Arnoldo Sanchez MD    ammonium lactate (Lac-Hydrin) 12 % lotion 1 Application, 1 Application, Topical, BID, Arnoldo Sanchez MD, 1 Application at 12/02/24 0900    aspirin chewable tablet 81 mg, 81 mg, oral, Daily, Arnoldo Sanchez MD    atorvastatin (Lipitor) tablet 10 mg, 10 mg, oral, Nightly, Arnoldo Sanchez MD    benzocaine-menthol (Cepastat Sore Throat) lozenge 1 lozenge, 1 lozenge, Mouth/Throat, q2h PRN, Arnoldo Sanchez MD    ceftaroline (Teflaro) 300 mg in dextrose 5% 50 mL IV, 300 mg, intravenous, q12h, Jose Alberto Spence MD, Stopped at 12/02/24 0742    cetirizine (ZyrTEC) tablet 10 mg, 10 mg, oral, Daily, Arnoldo Sanchez MD    chlorhexidine (Peridex) 0.12 % solution 15 mL, 15 mL, Mouth/Throat, BID, Jose Alberto Spence MD, 15 mL at 12/02/24  1047    cholecalciferol (Vitamin D-3) tablet 5,000 Units, 5,000 Units, oral, Daily, Arnoldo Sanchez MD    clopidogrel (Plavix) tablet 75 mg, 75 mg, oral, Daily, Arnoldo Sanchez MD    clotrimazole (Mycelex) adeline 10 mg, 10 mg, oral, 5x daily, Jose Alberto Spence MD, 10 mg at 12/02/24 1348    cyanocobalamin (Vitamin B-12) tablet 500 mcg, 500 mcg, oral, Daily, Arnoldo Sanchez MD    dextromethorphan-guaifenesin (Robitussin DM)  mg/5 mL oral liquid 5 mL, 5 mL, oral, q4h PRN, Arnoldo Sanchez MD    guaiFENesin (Mucinex) 12 hr tablet 600 mg, 600 mg, oral, q12h PRN, Arnoldo Sanchez MD    heparin (porcine) injection 7,500 Units, 7,500 Units, subcutaneous, q8h ANNIE, Arnoldo Sanchez MD, 7,500 Units at 12/02/24 1316    ipratropium-albuteroL (Duo-Neb) 0.5-2.5 mg/3 mL nebulizer solution 3 mL, 3 mL, nebulization, TID, Arnoldo Sanchez MD, 3 mL at 12/02/24 1256    loperamide (Imodium) capsule 2 mg, 2 mg, oral, 4x daily PRN, Arnoldo Sanchez MD    menthol-zinc oxide (Calmoseptine - Risamine) 0.44-20.6 % ointment, , Topical, 4x daily PRN, Arnoldo Sanchez MD    meropenem (Merrem) 1 g in sodium chloride 0.9% IV 50 mL, 1 g, intravenous, q12h, Arnoldo Sanchez MD, Stopped at 12/02/24 0443    mupirocin (Bactroban) 2 % ointment, , Topical, BID, Jose Alberto Spence MD, Given at 12/02/24 0836    nystatin (Mycostatin) 100,000 unit/gram powder 1 Application, 1 Application, Topical, BID, Arnoldo Sanchez MD, 1 Application at 12/02/24 0900    ondansetron ODT (Zofran-ODT) disintegrating tablet 4 mg, 4 mg, oral, q8h PRN, Arnoldo Sanchez MD    oxygen (O2) therapy, , inhalation, q8h, Arnoldo Sanchez MD    pantoprazole (ProtoNix) EC tablet 40 mg, 40 mg, oral, Daily before breakfast, Arnoldo Sanchez MD, 40 mg at 12/02/24 0618    polyethylene glycol (Glycolax, Miralax) packet 17 g, 17 g, oral, Daily PRN, Arnoldo Sanchez MD    potassium chloride 20 mEq in sterile water for injection 100 mL,  20 mEq, intravenous, q2h, Meaghan Choe MD, Last Rate: 50 mL/hr at 12/02/24 1541, 20 mEq at 12/02/24 1541    sennosides (Senokot) tablet 17.2 mg, 2 tablet, oral, BID, Arnoldo Sanchez MD    sodium chloride 0.9 % with KCl 20 mEq/L infusion, 75 mL/hr, intravenous, Continuous, Taryn Jimenez, Last Rate: 75 mL/hr at 12/02/24 1300, 75 mL/hr at 12/02/24 1300    [Held by provider] topiramate (Topamax) tablet 50 mg, 50 mg, oral, Daily, Arnoldo Sanchez MD    vancomycin (Vancocin) pharmacy to dose - pharmacy monitoring, , miscellaneous, Daily PRN, Arnoldo Sanchez MD   Assessment/Plan   Principal Problem:    Shortness of breath  Sepsis  Toxic and metabolic encephalopathy  Acute renal failure  Metabolic acidosis resolved  Hypokalemia  Oral thrush  Severe protein energy malnutrition  GERD  Respiratory failure  Morbid obesity  Decubitus ulcer   hyperuricemia  Anemia    Plan: Continue current medication.  Patient leukocytosis that is resolving.  Renal functions are better than before.  Replete potassium.  Patient started on clotrimazole torches and Hibiclens mouthwash.  Continued IV antibiotic patient has severe diarrhea.  Patient also has a decubitus ulcer.  Put a rectal tube.  Swallow evaluation.  Patient has hyperuricemia and anemia.  Guaiac stools.  Will take DVT, fall, aspiration, decubitus, DVT precaution.         Arnoldo Sanchez MD

## 2024-12-02 NOTE — PROGRESS NOTES
12/02/24 1115   Discharge Planning   Living Arrangements Other (Comment)  ([facility])   Support Systems Children   Assistance Needed Patient has help for ADLs and is dependent for IADLs.   Type of Residence Nursing home/residential care   Do you have animals or pets at home? No   Who is requesting discharge planning? Provider   Home or Post Acute Services Post acute facilities (Rehab/SNF/etc)   Type of Post Acute Facility Services Long term care   Expected Discharge Disposition Inter   Does the patient need discharge transport arranged? Yes   RoundTrip coordination needed? Yes   Has discharge transport been arranged? No   Financial Resource Strain   How hard is it for you to pay for the very basics like food, housing, medical care, and heating? Not hard   Housing Stability   In the last 12 months, was there a time when you were not able to pay the mortgage or rent on time? N   In the past 12 months, how many times have you moved where you were living? 0   At any time in the past 12 months, were you homeless or living in a shelter (including now)? N   Transportation Needs   In the past 12 months, has lack of transportation kept you from medical appointments or from getting medications? no   In the past 12 months, has lack of transportation kept you from meetings, work, or from getting things needed for daily living? No   Patient Choice   Provider Choice list and CMS website (https://medicare.gov/care-compare#search) for post-acute Quality and Resource Measure Data were provided and reviewed with: Family   Patient / Family choosing to utilize agency / facility established prior to hospitalization Yes   Intensity of Service   Intensity of Service 0-30 min     MSW met with nurse who expressed concerns regarding the condition of patient's mouth. MSW spoke with son and he reports patient has not brushed her teeth in years, by her own choice, and refused care at [facility]. MSW spoke with [facility] who  confirmed the same.     Patient is a long-term resident of [facility] and per son will return to [facility] upon discharge. Updates sent to [facility] and they confirm no barriers to patient's return.

## 2024-12-02 NOTE — ASSESSMENT & PLAN NOTE
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  Give supportive care.  Patient was has a 3, respiratory failure and he was vomiting.  The patient was Aspiration pneumonia.  Patient was slowly decubitus clear occupation has been started on broad-spectrum antibiotics Salter ID.  Consult Nephrology. Monitor blood cell count.  Monitor renal function panel.  Monitor electrolytes.   Monitor bicarb level.  Give IV fluid.  Monitor input output.  We will take deep vein thrombosis come fall, aspiration, decubitus, and deep vein thrombosis precautions.  This has been discussed with the patient and is agreeable to it.

## 2024-12-02 NOTE — PROGRESS NOTES
Amrita Lopez is a 84 y.o. female on day 2 of admission presenting with Shortness of breath.      Subjective   Patient is still n.p.o., her creatinine is starting to improve, her potassium is low, acidosis resolved, did not end up getting transferred to [facility] yesterday notably.  She is nonoliguric so far made 600 cc of urine today.  Reports she is having pain everywhere, has shortness of breath.  Per the nurse she is still having diarrhea.       Objective          Vitals 24HR  Heart Rate:  [81-87]   Temp:  [36.1 °C (97 °F)-36.5 °C (97.7 °F)]   Resp:  [15]   BP: (120-134)/(48-79)   SpO2:  [93 %-95 %]       Intake/Output last 3 Shifts:    Intake/Output Summary (Last 24 hours) at 12/2/2024 1228  Last data filed at 12/2/2024 1100  Gross per 24 hour   Intake 1160 ml   Output 1150 ml   Net 10 ml       Physical Exam  General: Awake, alert, fairly oriented, saying the year is 2025 but knows she is in a hospital, no acute distress  Respiratory:  Diminished/course breath sounds, normal respiratory effort  Cardiovascular:  no rubs appreciated  Gastrointestinal:  Soft, positive bowel sounds, no rebound or guarding  Extremities:  Mild edema, no cyanosis    Relevant Results  Results for orders placed or performed during the hospital encounter of 11/30/24 (from the past 24 hours)   CBC   Result Value Ref Range    WBC 17.2 (H) 4.4 - 11.3 x10*3/uL    nRBC 0.0 0.0 - 0.0 /100 WBCs    RBC 3.50 (L) 4.00 - 5.20 x10*6/uL    Hemoglobin 9.7 (L) 12.0 - 16.0 g/dL    Hematocrit 30.0 (L) 36.0 - 46.0 %    MCV 86 80 - 100 fL    MCH 27.7 26.0 - 34.0 pg    MCHC 32.3 32.0 - 36.0 g/dL    RDW 16.1 (H) 11.5 - 14.5 %    Platelets 451 (H) 150 - 450 x10*3/uL   Renal Function Panel   Result Value Ref Range    Glucose 171 (H) 74 - 99 mg/dL    Sodium 142 136 - 145 mmol/L    Potassium 2.9 (LL) 3.5 - 5.3 mmol/L    Chloride 104 98 - 107 mmol/L    Bicarbonate 26 21 - 32 mmol/L    Anion Gap 15 10 - 20 mmol/L    Urea Nitrogen 102 (HH) 6 - 23 mg/dL     Creatinine 2.00 (H) 0.50 - 1.05 mg/dL    eGFR 25 (L) >60 mL/min/1.73m*2    Calcium 8.0 (L) 8.6 - 10.3 mg/dL    Phosphorus 4.2 2.5 - 4.9 mg/dL    Albumin 2.0 (L) 3.4 - 5.0 g/dL   Vancomycin   Result Value Ref Range    Vancomycin 18.2 5.0 - 20.0 ug/mL   Magnesium   Result Value Ref Range    Magnesium 2.16 1.60 - 2.40 mg/dL            Assessment/Plan   Acute kidney injury likely with acute tubular necrosis in the setting of volume depletion and sepsis, starting to improve  Metabolic acidosis in setting of acute kidney injury and Topamax, resolved  Hypokalemia  UTI  Sepsis     Plan: It looks like the patient was never transferred to [facility] and since today her creatinine is trending down, no need to transfer to [facility] at this time for dialysis and we can assess on a daily basis.  This was communicated to Dr. Sanchez primary team.  Holding diuretics, hold Topamax.  Unclear why she is on Topamax but I did reach out to the primary team to recommend switching to an alternate therapy given her significant metabolic acidosis on presentation.  Avoid NSAIDs and nephrotoxic agents.  Monitor culture results.  Infectious disease on board.  Stop bicarbonate drip and switch IV fluids to normal saline with potassium.  Check an updated renal function panel this afternoon.    Meaghan Choe MD

## 2024-12-02 NOTE — PROGRESS NOTES
Occupational Therapy    Evaluation    Patient Name: Amrita Lopez  MRN: [id number]  Department: [address]  [facility] [place]  Today's Date: 12/2/2024  Time Calculation  Start Time: 0801  Stop Time: 0820  Time Calculation (min): 19 min    Assessment  IP OT Assessment  OT Assessment: 85 y/o female here with SOB/acute respiratory failure. On eval, she presents at her baseline functional status, requiring total care for ADLS, bed mobility, and functional transfers.  Advancement of activity is limited by patient's resistance at times.  No acute OT needs are indiciated.  Prognosis: Poor  Barriers to Discharge: None (return to LTC)  Evaluation/Treatment Tolerance: Patient limited by pain  Medical Staff Made Aware: Yes  End of Session Communication: Bedside nurse  End of Session Patient Position: Bed, 3 rail up, Alarm on  Plan:  No Skilled OT: At baseline function  OT Frequency: OT eval only  OT Discharge Recommendations: No further acute OT  Equipment Recommended upon Discharge: Lift  OT Recommended Transfer Status: Dependent  OT - OK to Discharge: Yes    Subjective   Current Problem:  1. Shortness of breath        2. Leukocytosis, unspecified type        3. Diverticulitis          General:  General  Reason for Referral: Decreased ADLS, SOB, acute respiratory failure  Referred By: Dr. Sanchez  Past Medical History Relevant to Rehab: chronic bronchiolitis, hyperlipidemia, stroke, B12 deficicency  Family/Caregiver Present: No  Prior to Session Communication: Bedside nurse  Patient Position Received: Bed, 3 rail up, Alarm on  Preferred Learning Style: visual, verbal  General Comment: Cleared by nsg, pt met in supine, agreeable to OT assessment  Precautions:  Hearing/Visual Limitations: vision and hearing appear WFL  Medical Precautions: Fall precautions, Oxygen therapy device and L/min  Precautions Comment: skin precautions    Pain:  Pain Assessment  Pain Assessment: FLACC (Face, Legs, Activity, Cry, Consolability)  0-10 (Numeric)  "Pain Score: 9  Pain Type: Chronic pain  Pain Location: Buttocks  Pain Interventions: Repositioned    Objective   Cognition:  Overall Cognitive Status: Impaired  Orientation Level: Disoriented to time, Disoriented to situation, Other (Comment) (grossly oriented to \"hospital\")  Following Commands: Other (Comment) (command following <25%; resistive at times)  Cognition Comments: poor situational awareness.  resistant to assistance at times. very soft spoken; tends to respond with one word answers intermittently    Home Living:  Type of Home: Long Term Care facility  Lives With: Other (Comment) (care staff)  Home Adaptive Equipment: Wheelchair-manual, Hospital bed, Other (Comment) (tay lift)  Home Layout: One level  Home Access: No concerns  Home Living Comments: from [facility]; apparently on hospice but full code? per MR     Prior Function:  Level of [place]: Needs assistance with ADLs, Needs assistance with homemaking, Needs assistance with functional transfers  Receives Help From: Other (Comment) (care staff)  ADL Assistance:  (total assist from staff)  Homemaking Assistance:  (care staff manages)  Ambulatory Assistance:  (nonambulator; shakes head \"yes\" when asked if staff uses a tay for OOB xfers.  also shakes head \"yes\" when asked if she xfers \"once a week\")  Vocational: Retired    ADL:  Eating Assistance: Not performed  Eating Deficit: NPO  Grooming Assistance: Maximal  Bathing Assistance: Total  UE Dressing Assistance: Total  LE Dressing Assistance: Total  Toileting Assistance with Device: Total  ADL Comments: pt found severely incontinent of BM in supine;  required dependent assist x2 for bed level hygiene, clothing, and linen mgmt.  profoudly limited by pain at buttocks and wounds;  resistant to assistance when attempting to roll    Activity Tolerance:  Endurance: Decreased tolerance for upright activites  Activity Tolerance Comments: on supplemental O2    Bed Mobility/Transfers:   Bed " Mobility  Bed Mobility: Yes  Bed Mobility 1  Bed Mobility 1: Rolling left, Rolling right  Level of Assistance 1: Dependent, +2  Bed Mobility Comments 1: heavy dep assist x2 for lateral rolling in supine while attempting to complete hygiene/linen change  Bed Mobility 2  Bed Mobility  2: Scooting  Level of Assistance 2: Dependent, +2  Bed Mobility Comments 2: dependent scoot towards HOB with bed in [place]    Transfers  Transfer: No (not safe to attempt)    Functional Mobility:  Functional Mobility  Functional Mobility Performed: No    Vision: Vision - Basic Assessment  Current Vision: Does not wear glasses  Sensation:  Light Touch: Not tested (difficult to formally assess; somewhat hypersensitive to touch in LES)  Strength:  Strength Comments: BUES at least >/= 3-/5; observed functionally  Coordination:  Movements are Fluid and Coordinated: No  Upper Body Coordination: impaired d/t weakness  Lower Body Coordination: impaired d/t weakness  Trunk Coordination: impaired   Hand Function:  Hand Function  Gross Grasp: Impaired  Coordination: Impaired  Extremities: RUE   RUE : Exceptions to WFL and LUE   LUE: Exceptions to WFL    Outcome Measures: [facility] Daily Activity  Putting on and taking off regular lower body clothing: Total  Bathing (including washing, rinsing, drying): Total  Putting on and taking off regular upper body clothing: Total  Toileting, which includes using toilet, bedpan or urinal: Total  Taking care of personal grooming such as brushing teeth: Total  Eating Meals: A lot  Daily Activity - Total Score: 7      Education Documentation  No documentation found.  Education Comments  No comments found.      Goals:   total care; at baseline, no acute OT needs at this time.

## 2024-12-02 NOTE — PROGRESS NOTES
Amrita Lopez is a 84 y.o. female on day 2 of admission presenting with Shortness of breath.    Subjective   Interval History:   Awake, somewhat confused  Afebrile        Review of Systems   Unable to perform ROS: Mental status change       Objective   Range of Vitals (last 24 hours)  Heart Rate:  [81-87]   Temp:  [36.1 °C (97 °F)-36.5 °C (97.7 °F)]   Resp:  [15-17]   BP: (115-134)/(48-79)   SpO2:  [93 %-95 %]   Daily Weight  12/01/24 : 118 kg (259 lb 4.2 oz)    Body mass index is 44.5 kg/m².    Physical Exam  HENT:      Head: Normocephalic and atraumatic.      Nose: Nose normal.   Eyes:      General: No scleral icterus.     Conjunctiva/sclera: Conjunctivae normal.   Cardiovascular:      Rate and Rhythm: Normal rate and regular rhythm.   Pulmonary:      Breath sounds: Decreased breath sounds present.   Abdominal:      General: Bowel sounds are normal.      Palpations: Abdomen is soft.   Musculoskeletal:      Cervical back: Normal range of motion and neck supple.      Right lower leg: No edema.      Left lower leg: No edema.   Skin:     General: Skin is warm and dry.   Neurological:      Mental Status: She is awake, confused  Psychiatric:      Comments: Awake, confused    Antibiotics  ceftaroline (Teflaro) IV in 50 mL D5W  meropenem - 1 gram/50 mL  mupirocin - 2 %  vancomycin    Relevant Results  Labs  Results from last 72 hours   Lab Units 12/02/24 0440 12/01/24 0426 11/30/24  0707   WBC AUTO x10*3/uL 17.2* 27.7* 29.1*   HEMOGLOBIN g/dL 9.7* 10.7* 12.4   HEMATOCRIT % 30.0* 35.0* 39.5   PLATELETS AUTO x10*3/uL 451* 424 448   LYMPHO PCT MAN %  --   --  5.0   MONO PCT MAN %  --   --  1.0   EOSINO PCT MAN %  --   --  0.0     Results from last 72 hours   Lab Units 12/02/24  0440 12/01/24  0426 11/30/24  0707   SODIUM mmol/L 142 140 135*   POTASSIUM mmol/L 2.9* 3.7 3.7   CHLORIDE mmol/L 104 106 103   CO2 mmol/L 26 14* 16*   BUN mg/dL 102* 106* 94*   CREATININE mg/dL 2.00* 2.56* 1.98*   GLUCOSE mg/dL 171* 122* 217*  "  CALCIUM mg/dL 8.0* 8.1* 8.9   ANION GAP mmol/L 15 24* 20   EGFR mL/min/1.73m*2 25* 18* 25*   PHOSPHORUS mg/dL 4.2  --   --      Results from last 72 hours   Lab Units 12/02/24  0440 12/01/24  0426 11/30/24  0707   ALK PHOS U/L  --  81 91   BILIRUBIN TOTAL mg/dL  --  0.6 0.5   BILIRUBIN DIRECT mg/dL  --   --  0.1   PROTEIN TOTAL g/dL  --  4.4* 5.8*   ALT U/L  --  19 11   AST U/L  --  23 10   ALBUMIN g/dL 2.0* 2.0* 2.5*     Estimated Creatinine Clearance: 27.4 mL/min (A) (by C-G formula based on SCr of 2 mg/dL (H)).  No results found for: \"CRP\"  Microbiology  Reviewed-positive MRSA PCR, negative urine culture  Imaging  ECG 12 Lead    Result Date: 12/2/2024  Normal sinus rhythm Possible Inferior infarct , age undetermined Abnormal ECG No previous ECGs available    CT abdomen pelvis wo IV contrast    Result Date: 11/30/2024  Interpreted By:  Aris Malone, STUDY: CT ABDOMEN PELVIS WO IV CONTRAST;  11/30/2024 10:09 am   INDICATION: Signs/Symptoms:buttock pain, wound, eval for abscess vs other. Acute renal injury/no contrast..   COMPARISON: CT abdomen and pelvis dated 12/08/2022.   ACCESSION NUMBER(S): [id number]   ORDERING CLINICIAN: JORGE ASHBY   TECHNIQUE: CT of the abdomen and pelvis was performed. Contiguous axial images were obtained at 3 mm slice thickness through the abdomen and pelvis. Coronal and sagittal reconstructions at 3 mm slice thickness were performed.  No intravenous contrast was administered.   Assessment of the abdominopelvic viscera is also limited by beam hardening artifact related to patient body habitus and positioning of the extremities.   FINDINGS: Please note that the evaluation of vessels, lymph nodes and organs is limited without intravenous contrast.   LOWER CHEST: Mild bibasilar scarring/atelectasis. Peripheral bronchiectasis and mucous plugging within the visualized lower lobes. Calcification of the mitral valve annulus. Aortic valve calcifications also suggested. Mild coronary artery " calcifications within the visualized regions.   ABDOMEN:   LIVER: The liver demonstrates homogeneous attenuation without evidence of focal liver lesions.   BILE DUCTS: The intrahepatic and extrahepatic ducts are not dilated.   GALLBLADDER: No calcified stones. No wall thickening.   PANCREAS: Mild fatty infiltration of the pancreatic parenchyma. Otherwise within normal limits.   SPLEEN: Within normal limits.   ADRENAL GLANDS: Bilateral adrenal glands appear normal.   KIDNEYS AND URETERS: The kidneys are normal in size and unremarkable in appearance. Hydronephrosis or hydroureter. Questionable punctate nonobstructing right renal calculus (series 4, image 52).   PELVIS:   BLADDER: Ariza catheter within a collapsed bladder, limiting evaluation.   REPRODUCTIVE ORGANS: Uterus and ovaries are unremarkable in CT appearance.   BOWEL: Small hiatal hernia. The stomach is otherwise unremarkable. Sigmoid colonic diverticulosis. There is mild element of fat stranding within this region (series 4, image 112),  possibly representing a mild element of acute uncomplicated sigmoid diverticulitis. There is mild upstream gaseous distention of the colon with air-fluid levels that could reflect an element of ileus or colitis. Similarly there are a few nonspecific air-fluid levels within the nondilated small bowel loops. Fluid-filled, otherwise normal appearing appendix within the right lower quadrant (series 4, image 97, for example).   VESSELS: Severe atherosclerotic calcification of the infrarenal abdominal aorta and iliac arteries. Ectasia of the infrarenal abdominal aorta measuring up to 2.9 cm in caliber.   PERITONEUM/RETROPERITONEUM/LYMPH NODES: No ascites or free air, no fluid collection.  No enlarged or suspicious lymph nodes.   ABDOMINAL WALL: Obesity. Fatty atrophy of the posterior paraspinal musculature. Tiny fat containing umbilical hernia.   BONES: No suspicious osseous lesions are identified. Diffusely decreased bone  mineralization. Sclerosis of the bilateral SI joints. Extensive lower lumbar facet arthropathy with degenerative anterolisthesis of L4 on L5 and rotatory levo scoliosis of the thoracolumbar spine.       1. Sigmoid colonic diverticulosis with mild surrounding inflammatory fat stranding concerning for acute uncomplicated sigmoid colonic diverticulitis. There is mild upstream gaseous distention of the colon with air-fluid levels that may reflect an element of ileus or colitis. No evidence to indicate bowel obstruction. 2. Severe atherosclerosis with ectasia of the infrarenal abdominal aorta measuring up to 2.9 cm in caliber. 3. Bronchiectasis and peripheral mucous plugging within the lung bases. 4. Lumbar degenerative change. 5. Additional chronic findings as above.   MACRO: None   Signed by: Aris Malone 11/30/2024 10:42 AM Dictation workstation:   [id number]    XR chest 1 view    Result Date: 11/30/2024  Interpreted By:  Rekha Rojas, STUDY: XR CHEST 1 VIEW;  11/30/2024 7:57 am   INDICATION: Signs/Symptoms:sob.     COMPARISON: 12/08/2022   ACCESSION NUMBER(S): [id number]   ORDERING CLINICIAN: JORGE ASHBY   FINDINGS: Artifact from overlying monitoring leads. Right hilar fullness and perihilar interstitial prominence similar to the previous exam. No focal airspace consolidation or pleural effusion. The cardiac silhouette is within normal limits for size. Distended gas-filled stomach beneath the left diaphragm.       Perihilar interstitial prominence.   MACRO: None.   Signed by: Rekha Rojas 11/30/2024 8:24 AM Dictation workstation:   [id number]        Assessment/Plan   Encephalopathy-toxic metabolic versus infectious  Abnormal CT abdomen and pelvis-sigmoid diverticulitis  Bronchiectasis  Acute hypoxic respiratory failure  MRSA colonization of nares  Resolving leukocytosis        Discontinue meropenem  IV Zosyn  IV ceftaroline-avoiding vancomycin-coverage for MRSA  Decolonize per protocol  Monitor renal  function  Coronavirus/influenza/RSV testing  Oxygen as needed  Supportive care  Monitor temperature and WBC      Jose Alberto Spence MD

## 2024-12-02 NOTE — PROGRESS NOTES
"Speech-Language Pathology Clinical Swallow Evaluation    Patient Name: Amrita Lopez  MRN: [id number]  : 1940  Today's Date: 24  Start Time: 0840  Stop Time: 0915  Time Calculation (min): 35 min      ASSESSMENT  Impressions:  Moderate oral phase and suspected pharyngeal phase dysphagia based on clinical swallow evaluation.     Verbalization limited to: name, birthday, \"where am I\", \"hurts\".    Patient has severe oral dryness with blood crusted sputum throughout entire oral cavity including hard and soft palate, buccal spaces bilaterally, tongue, upper and lower lips and teeth. SLP completed oral care with [employer] oral care kit,  however, patient took toothette, placed it back in solution and pushed it away indicating she was finished.    Noted delayed coughing with tsp of thin and nectar thick liquids, however, SLP did not palpate pharyngeal movement. Considering oral pain may be factor in swallow function (lack of swallow initiation) as patient was wincing during oral care. Recommend consistent oral care hourly and repeat bedside evaluation 12/3/24. Patient is not appropriate for MBS at this time that was ordered 24.    Pt would benefit from skilled ST to minimize aspiration risk and ensure ongoing safety with the least restrictive diet.       PLAN  Recommendations:  Is MBSS recommended? Yes, however pt is not appropriate for MBSS this date due to inability to tolerated sips of thin or nectar thick liquids and marked crusted sputum in oral cavity.  Solid consistency: NPO  Liquid consistency: NPO  Medication administration: Non oral    Recommended frequency/duration:  Skilled SLP services recommended: Yes  Frequency: 3x/week  Duration: 2 weeks  Discharge recommendation: Recommend MODERATE intensity ST upon DC in order to ensure safety with least restrictive diet.     Strengths: N/A  Barriers to participation in tx: Cognition, Anticipated fair or poor medical prognosis, Severity of symptoms, and " "Comorbidities    Goals:  In 2 weeks...  Pt will consume PO trials with SLP without s/sx aspiration in order to determine readiness for PO diet.  2.   Pt will participate in MBSS to determine readiness for a PO diet when appropriate.   GOAL START: 12/2/2024   GOAL END: 12/16/2024   Status: Goal initiated this date   Progress this date:       SUBJECTIVE    PMHx relevant to rehab: Principal Problem:    Shortness of breath      Chief complaint: Pt was admitted on 11/30/24 due to   Chief Complaint   Patient presents with    Shortness of Breath   . She was found to have Shortness of breath.    Relevant imaging results:  XR chest 1 view     11/30/2024     IMPRESSION:  Perihilar interstitial prominence.      General Visit Information:  RN cleared pt to participate in session and reported patient is NPO    Pt reported: unable to report. Vocal quality at a whisper. Verbalization limited to: name, birthday, \"where am I\", \"hurts\"       Status at time of evaluation:  Pain Assessment  Pain Assessment: 0-10  0-10 (Numeric) Pain Score:  (patient could not state. Wincing in pain with oral care and raising of head of bed.)  [place] FACES Pain Rating: No hurt    Pt was alert and required encouragement to participate for session.  Orientation: Oriented to self  Ability to follow functional commands: Follows simple commands  Nutritional status: Appears well-nourished/no concerns    Respiratory status: Supplemental oxygen via NC           Patient positioning: Pt refused fully upright positioning; partially reclined due to wincing in pain      OBJECTIVE  Clinical swallow evaluation completed and consisted of limited oral motor assessment, oral care prior to PO trials, and limited PO trials (1 ice chip, 1 tsp thin liquids, 1 tps nectar thick liquids).  ORAL PHASE: Natural dentition in poor condition. Oral mucosa were thick coated with dry blood tinged secretion on hard and soft palate, buccal spaces bilaterally, tongue, upper and lower " lips and teeth. SLP completed oral care with [employer] oral care kit,  however, patient took toothette, placed it back in solution and pushed it away indicating she was finished.       Lingual strength and ROM were weak and reduced. Labial strength/ROM were weak and reduced. Labial seal was adequate.  A/P transit and oral clearance were noted, however, swallow initiation was not palpated.    PHARYNGEAL PHASE: Laryngeal elevation was NOT visualized or palpated with any trial.  Adequacy of hyolaryngeal elevation/excursion cannot be determined at bedside.  Delayed s/sx aspiration/penetration was observed with thin and nectar thick liquid trials.    Was 3oz challenge administered: No, deferred due to safety concerns.      Treatment/Education:  Results and recommendations were relayed to: Patient and Bedside nurse  Education provided: Yes   Learner: Patient   Barriers to learning: Cognitive limitations barrier   Method of teaching: Verbal   Topic: role of ST, results of assessment, risk for aspiration, and recommendation for dysphagia follow-up   Outcome of teaching: Unable to demonstrate understanding at this time  Treatment provided: No  Next Treatment Priority: REASSESS

## 2024-12-02 NOTE — PROGRESS NOTES
[facility] Dosing [facility]    Amrita Lopez is a 84 y.o. year old female who Pharmacy has been consulted for vancomycin dosing for cellulitis/skin and soft tissue infection. Based on the patient's indication and renal status this patient will be dosed based on a target level of SSTI/UTI: 10-15 mcg/mL    Patient is currently in ROM    Last vancomycin dose (incl. date and time): 2000 mg on 11/30 at 0911    Vancomycin level (incl. date and time): 18.2 mcg/mL on 12/2 at 0440    Lab Results   Component Value Date    VANCORANDOM 18.2 12/02/2024       Visit Vitals  /60 (BP Location: Right arm, Patient Position: Lying)   Pulse 87   Temp 36.5 °C (97.7 °F) (Temporal)   Resp 15           Lab Results   Component Value Date    CREATININE 2.00 (H) 12/02/2024    CREATININE 2.56 (H) 12/01/2024    CREATININE 1.98 (H) 11/30/2024    CREATININE 0.90 05/24/2024       I/O last 3 completed shifts:  In: 3000 (25.5 mL/kg) [I.V.:2790 (23.7 mL/kg); IV Piggyback:210]  Out: 950 (8.1 mL/kg) [Urine:950 (0.2 mL/kg/hr)]  Weight: 117.6 kg     Assessment/Plan     Level is above target trough goal.   hold vancomycin.  Random level within 24 hours will be obtained on 12/3 at 0500. May be obtained sooner if clinically indicated.   Will continue to monitor renal function daily while on vancomycin and order serum creatinine at least every 48 hours if not already ordered.  Follow for continued vancomycin needs, clinical response, and signs/symptoms of toxicity.     HEATHER PAIGE, PharmD

## 2024-12-03 LAB
ANION GAP SERPL CALCULATED.3IONS-SCNC: 10 MMOL/L (ref 10–20)
BASOPHILS # BLD AUTO: 0.04 X10*3/UL (ref 0–0.1)
BASOPHILS NFR BLD AUTO: 0.3 %
BUN SERPL-MCNC: 71 MG/DL (ref 6–23)
CALCIUM SERPL-MCNC: 8 MG/DL (ref 8.6–10.3)
CHLORIDE SERPL-SCNC: 111 MMOL/L (ref 98–107)
CO2 SERPL-SCNC: 30 MMOL/L (ref 21–32)
CREAT SERPL-MCNC: 1.28 MG/DL (ref 0.5–1.05)
EGFRCR SERPLBLD CKD-EPI 2021: 42 ML/MIN/1.73M*2
EOSINOPHIL # BLD AUTO: 0.04 X10*3/UL (ref 0–0.4)
EOSINOPHIL NFR BLD AUTO: 0.3 %
ERYTHROCYTE [DISTWIDTH] IN BLOOD BY AUTOMATED COUNT: 16.8 % (ref 11.5–14.5)
GLUCOSE SERPL-MCNC: 114 MG/DL (ref 74–99)
HCT VFR BLD AUTO: 29.9 % (ref 36–46)
HGB BLD-MCNC: 9.3 G/DL (ref 12–16)
IMM GRANULOCYTES # BLD AUTO: 0.63 X10*3/UL (ref 0–0.5)
IMM GRANULOCYTES NFR BLD AUTO: 4.9 % (ref 0–0.9)
LYMPHOCYTES # BLD AUTO: 1.67 X10*3/UL (ref 0.8–3)
LYMPHOCYTES NFR BLD AUTO: 12.9 %
MCH RBC QN AUTO: 27.4 PG (ref 26–34)
MCHC RBC AUTO-ENTMCNC: 31.1 G/DL (ref 32–36)
MCV RBC AUTO: 88 FL (ref 80–100)
MONOCYTES # BLD AUTO: 0.7 X10*3/UL (ref 0.05–0.8)
MONOCYTES NFR BLD AUTO: 5.4 %
NEUTROPHILS # BLD AUTO: 9.88 X10*3/UL (ref 1.6–5.5)
NEUTROPHILS NFR BLD AUTO: 76.2 %
NRBC BLD-RTO: 0 /100 WBCS (ref 0–0)
PLATELET # BLD AUTO: 424 X10*3/UL (ref 150–450)
POTASSIUM SERPL-SCNC: 3.4 MMOL/L (ref 3.5–5.3)
RBC # BLD AUTO: 3.39 X10*6/UL (ref 4–5.2)
SODIUM SERPL-SCNC: 148 MMOL/L (ref 136–145)
VANCOMYCIN SERPL-MCNC: 10.2 UG/ML (ref 5–20)
WBC # BLD AUTO: 13 X10*3/UL (ref 4.4–11.3)

## 2024-12-03 PROCEDURE — 2500000001 HC RX 250 WO HCPCS SELF ADMINISTERED DRUGS (ALT 637 FOR MEDICARE OP): Performed by: INTERNAL MEDICINE

## 2024-12-03 PROCEDURE — 2500000004 HC RX 250 GENERAL PHARMACY W/ HCPCS (ALT 636 FOR OP/ED): Mod: JZ | Performed by: INTERNAL MEDICINE

## 2024-12-03 PROCEDURE — 2500000004 HC RX 250 GENERAL PHARMACY W/ HCPCS (ALT 636 FOR OP/ED): Performed by: INTERNAL MEDICINE

## 2024-12-03 PROCEDURE — 2060000001 HC INTERMEDIATE ICU ROOM DAILY

## 2024-12-03 PROCEDURE — 80202 ASSAY OF VANCOMYCIN: CPT

## 2024-12-03 PROCEDURE — 2500000002 HC RX 250 W HCPCS SELF ADMINISTERED DRUGS (ALT 637 FOR MEDICARE OP, ALT 636 FOR OP/ED): Performed by: INTERNAL MEDICINE

## 2024-12-03 PROCEDURE — 2500000005 HC RX 250 GENERAL PHARMACY W/O HCPCS: Performed by: INTERNAL MEDICINE

## 2024-12-03 PROCEDURE — 94640 AIRWAY INHALATION TREATMENT: CPT

## 2024-12-03 PROCEDURE — 85025 COMPLETE CBC W/AUTO DIFF WBC: CPT | Performed by: INTERNAL MEDICINE

## 2024-12-03 PROCEDURE — 92526 ORAL FUNCTION THERAPY: CPT | Mod: GN | Performed by: SPEECH-LANGUAGE PATHOLOGIST

## 2024-12-03 PROCEDURE — 80048 BASIC METABOLIC PNL TOTAL CA: CPT | Performed by: INTERNAL MEDICINE

## 2024-12-03 PROCEDURE — 36415 COLL VENOUS BLD VENIPUNCTURE: CPT | Performed by: INTERNAL MEDICINE

## 2024-12-03 RX ORDER — POTASSIUM CHLORIDE, DEXTROSE MONOHYDRATE 150; 5 MG/100ML; G/100ML
70 INJECTION, SOLUTION INTRAVENOUS CONTINUOUS
Status: DISCONTINUED | OUTPATIENT
Start: 2024-12-03 | End: 2024-12-04

## 2024-12-03 ASSESSMENT — COGNITIVE AND FUNCTIONAL STATUS - GENERAL
MOVING FROM LYING ON BACK TO SITTING ON SIDE OF FLAT BED WITH BEDRAILS: TOTAL
MOVING TO AND FROM BED TO CHAIR: TOTAL
TURNING FROM BACK TO SIDE WHILE IN FLAT BAD: TOTAL
MOBILITY SCORE: 6
PERSONAL GROOMING: TOTAL
EATING MEALS: TOTAL
CLIMB 3 TO 5 STEPS WITH RAILING: TOTAL
TOILETING: TOTAL
WALKING IN HOSPITAL ROOM: TOTAL
DRESSING REGULAR LOWER BODY CLOTHING: TOTAL
DAILY ACTIVITIY SCORE: 6
DRESSING REGULAR UPPER BODY CLOTHING: TOTAL
HELP NEEDED FOR BATHING: TOTAL
STANDING UP FROM CHAIR USING ARMS: TOTAL

## 2024-12-03 ASSESSMENT — PAIN SCALES - GENERAL
PAINLEVEL_OUTOF10: 0 - NO PAIN
PAINLEVEL_OUTOF10: 4

## 2024-12-03 ASSESSMENT — PAIN - FUNCTIONAL ASSESSMENT: PAIN_FUNCTIONAL_ASSESSMENT: 0-10

## 2024-12-03 NOTE — NURSING NOTE
1330: I left a voicemail message on Dr. Sanchez's cell, asking whether or not to hold or decrease the heparin due to the fact that her wounds on her bottom are very actively bleeding sam red blood.

## 2024-12-03 NOTE — PROGRESS NOTES
12/03/24 0733   Discharge Planning   Home or Post Acute Services Post acute facilities (Rehab/SNF/etc)   Type of Post Acute Facility Services Long term care  ([facility].  No barriers to return.  Patient can return when medically ready.)   Expected Discharge Disposition Inter   Does the patient need discharge transport arranged? Yes   RoundTrip coordination needed? Yes   Has discharge transport been arranged? No

## 2024-12-03 NOTE — PROGRESS NOTES
Amrita Lopez is a 84 y.o. female on day 3 of admission presenting with Shortness of breath.      Subjective   Patient reports she is having pain all over otherwise no complaints.  Her creatinine is much improved down to 1.28, sodium is a little up to 148.  She is nonoliguric.       Objective          Vitals 24HR  Temp:  [36.3 °C (97.3 °F)-36.7 °C (98.1 °F)]   BP: (106-155)/(55-81)   Weight:  [120 kg (264 lb)]   SpO2:  [94 %-98 %]       Intake/Output last 3 Shifts:    Intake/Output Summary (Last 24 hours) at 12/3/2024 1117  Last data filed at 12/3/2024 0700  Gross per 24 hour   Intake 1576.67 ml   Output 1150 ml   Net 426.67 ml       Physical Exam  General: Awake, alert,  no acute distress  Respiratory: Fairly clear breath sounds, normal respiratory effort  Cardiovascular:  no rubs appreciated  Gastrointestinal:  Soft, positive bowel sounds, no rebound or guarding  Extremities:  Mild edema, no cyanosis    Relevant Results  Results for orders placed or performed during the hospital encounter of 11/30/24 (from the past 24 hours)   Renal Function Panel   Result Value Ref Range    Glucose 118 (H) 74 - 99 mg/dL    Sodium 147 (H) 136 - 145 mmol/L    Potassium 3.9 3.5 - 5.3 mmol/L    Chloride 107 98 - 107 mmol/L    Bicarbonate 30 21 - 32 mmol/L    Anion Gap 14 10 - 20 mmol/L    Urea Nitrogen 88 (H) 6 - 23 mg/dL    Creatinine 1.54 (H) 0.50 - 1.05 mg/dL    eGFR 34 (L) >60 mL/min/1.73m*2    Calcium 8.2 (L) 8.6 - 10.3 mg/dL    Phosphorus 3.3 2.5 - 4.9 mg/dL    Albumin 2.1 (L) 3.4 - 5.0 g/dL   Vancomycin   Result Value Ref Range    Vancomycin 10.2 5.0 - 20.0 ug/mL   CBC and Auto Differential   Result Value Ref Range    WBC 13.0 (H) 4.4 - 11.3 x10*3/uL    nRBC 0.0 0.0 - 0.0 /100 WBCs    RBC 3.39 (L) 4.00 - 5.20 x10*6/uL    Hemoglobin 9.3 (L) 12.0 - 16.0 g/dL    Hematocrit 29.9 (L) 36.0 - 46.0 %    MCV 88 80 - 100 fL    MCH 27.4 26.0 - 34.0 pg    MCHC 31.1 (L) 32.0 - 36.0 g/dL    RDW 16.8 (H) 11.5 - 14.5 %    Platelets 424 150  - 450 x10*3/uL    Neutrophils % 76.2 40.0 - 80.0 %    Immature Granulocytes %, Automated 4.9 (H) 0.0 - 0.9 %    Lymphocytes % 12.9 13.0 - 44.0 %    Monocytes % 5.4 2.0 - 10.0 %    Eosinophils % 0.3 0.0 - 6.0 %    Basophils % 0.3 0.0 - 2.0 %    Neutrophils Absolute 9.88 (H) 1.60 - 5.50 x10*3/uL    Immature Granulocytes Absolute, Automated 0.63 (H) 0.00 - 0.50 x10*3/uL    Lymphocytes Absolute 1.67 0.80 - 3.00 x10*3/uL    Monocytes Absolute 0.70 0.05 - 0.80 x10*3/uL    Eosinophils Absolute 0.04 0.00 - 0.40 x10*3/uL    Basophils Absolute 0.04 0.00 - 0.10 x10*3/uL   Basic Metabolic Panel   Result Value Ref Range    Glucose 114 (H) 74 - 99 mg/dL    Sodium 148 (H) 136 - 145 mmol/L    Potassium 3.4 (L) 3.5 - 5.3 mmol/L    Chloride 111 (H) 98 - 107 mmol/L    Bicarbonate 30 21 - 32 mmol/L    Anion Gap 10 10 - 20 mmol/L    Urea Nitrogen 71 (H) 6 - 23 mg/dL    Creatinine 1.28 (H) 0.50 - 1.05 mg/dL    eGFR 42 (L) >60 mL/min/1.73m*2    Calcium 8.0 (L) 8.6 - 10.3 mg/dL            Assessment/Plan   Acute kidney injury from acute tubular necrosis in the setting of volume depletion and sepsis, improving  Metabolic acidosis in setting of acute kidney injury and Topamax, resolved  Hypokalemia  Hypernatremia secondary to dehydration  UTI  Sepsis     Plan: Switch IV fluids to D5W with 20 millequivalents of potassium.  Holding diuretics, hold Topamax.  I did reach out to the primary team to recommend switching to an alternate therapy given her significant metabolic acidosis on presentation.  Avoid NSAIDs and nephrotoxic agents. Infectious disease on board.      Meaghan Choe MD

## 2024-12-03 NOTE — PROGRESS NOTES
Amrita Lopez is a 84 y.o. female on day 3 of admission presenting with Shortness of breath.    Subjective   Interval History:   Patient seen and examined  Nurse present  Awake, nonverbal  No chest pain        Review of Systems   All other systems reviewed and are negative.      Objective   Range of Vitals (last 24 hours)  Temp:  [36.3 °C (97.3 °F)-36.7 °C (98.1 °F)]   BP: (126-155)/(49-81)   Weight:  [120 kg (264 lb)]   SpO2:  [94 %-98 %]   Daily Weight  12/03/24 : 120 kg (264 lb)    Body mass index is 45.32 kg/m².    Physical Exam  HENT:      Head: Normocephalic and atraumatic.      Nose: Nose normal.   Eyes:      General: No scleral icterus.     Conjunctiva/sclera: Conjunctivae normal.   Cardiovascular:      Rate and Rhythm: Normal rate and regular rhythm.   Pulmonary:      Breath sounds: Decreased breath sounds present.   Abdominal:      General: Bowel sounds are normal.      Palpations: Abdomen is soft.   Musculoskeletal:      Cervical back: Normal range of motion and neck supple.      Right lower leg: No edema.      Left lower leg: No edema.   Skin:     General: Skin is warm and dry.   Neurological:      Mental Status: She is awake, confused  Psychiatric:      Comments: Awake, confused    Antibiotics  ceftaroline (Teflaro) IV in 50 mL D5W  mupirocin - 2 %  piperacillin-tazobactam - 2.25 gram/50 mL    Relevant Results  Labs  Results from last 72 hours   Lab Units 12/03/24  0721 12/02/24  0440 12/01/24  0426   WBC AUTO x10*3/uL 13.0* 17.2* 27.7*   HEMOGLOBIN g/dL 9.3* 9.7* 10.7*   HEMATOCRIT % 29.9* 30.0* 35.0*   PLATELETS AUTO x10*3/uL 424 451* 424   NEUTROS PCT AUTO % 76.2  --   --    LYMPHS PCT AUTO % 12.9  --   --    MONOS PCT AUTO % 5.4  --   --    EOS PCT AUTO % 0.3  --   --      Results from last 72 hours   Lab Units 12/03/24  0721 12/02/24  1713 12/02/24  0440   SODIUM mmol/L 148* 147* 142   POTASSIUM mmol/L 3.4* 3.9 2.9*   CHLORIDE mmol/L 111* 107 104   CO2 mmol/L 30 30 26   BUN mg/dL 71* 88* 102*  "  CREATININE mg/dL 1.28* 1.54* 2.00*   GLUCOSE mg/dL 114* 118* 171*   CALCIUM mg/dL 8.0* 8.2* 8.0*   ANION GAP mmol/L 10 14 15   EGFR mL/min/1.73m*2 42* 34* 25*   PHOSPHORUS mg/dL  --  3.3 4.2     Results from last 72 hours   Lab Units 12/02/24  1713 12/02/24  0440 12/01/24  0426   ALK PHOS U/L  --   --  81   BILIRUBIN TOTAL mg/dL  --   --  0.6   PROTEIN TOTAL g/dL  --   --  4.4*   ALT U/L  --   --  19   AST U/L  --   --  23   ALBUMIN g/dL 2.1* 2.0* 2.0*     Estimated Creatinine Clearance: 43.2 mL/min (A) (by C-G formula based on SCr of 1.28 mg/dL (H)).  No results found for: \"CRP\"  Microbiology  Reviewed-blood cultures pending  Imaging  ECG 12 Lead    Result Date: 12/2/2024  Normal sinus rhythm Possible Inferior infarct , age undetermined Abnormal ECG No previous ECGs available    CT abdomen pelvis wo IV contrast    Result Date: 11/30/2024  Interpreted By:  Aris Malone, STUDY: CT ABDOMEN PELVIS WO IV CONTRAST;  11/30/2024 10:09 am   INDICATION: Signs/Symptoms:buttock pain, wound, eval for abscess vs other. Acute renal injury/no contrast..   COMPARISON: CT abdomen and pelvis dated 12/08/2022.   ACCESSION NUMBER(S): [id number]   ORDERING CLINICIAN: JORGE ASHBY   TECHNIQUE: CT of the abdomen and pelvis was performed. Contiguous axial images were obtained at 3 mm slice thickness through the abdomen and pelvis. Coronal and sagittal reconstructions at 3 mm slice thickness were performed.  No intravenous contrast was administered.   Assessment of the abdominopelvic viscera is also limited by beam hardening artifact related to patient body habitus and positioning of the extremities.   FINDINGS: Please note that the evaluation of vessels, lymph nodes and organs is limited without intravenous contrast.   LOWER CHEST: Mild bibasilar scarring/atelectasis. Peripheral bronchiectasis and mucous plugging within the visualized lower lobes. Calcification of the mitral valve annulus. Aortic valve calcifications also suggested. " Mild coronary artery calcifications within the visualized regions.   ABDOMEN:   LIVER: The liver demonstrates homogeneous attenuation without evidence of focal liver lesions.   BILE DUCTS: The intrahepatic and extrahepatic ducts are not dilated.   GALLBLADDER: No calcified stones. No wall thickening.   PANCREAS: Mild fatty infiltration of the pancreatic parenchyma. Otherwise within normal limits.   SPLEEN: Within normal limits.   ADRENAL GLANDS: Bilateral adrenal glands appear normal.   KIDNEYS AND URETERS: The kidneys are normal in size and unremarkable in appearance. Hydronephrosis or hydroureter. Questionable punctate nonobstructing right renal calculus (series 4, image 52).   PELVIS:   BLADDER: Ariza catheter within a collapsed bladder, limiting evaluation.   REPRODUCTIVE ORGANS: Uterus and ovaries are unremarkable in CT appearance.   BOWEL: Small hiatal hernia. The stomach is otherwise unremarkable. Sigmoid colonic diverticulosis. There is mild element of fat stranding within this region (series 4, image 112),  possibly representing a mild element of acute uncomplicated sigmoid diverticulitis. There is mild upstream gaseous distention of the colon with air-fluid levels that could reflect an element of ileus or colitis. Similarly there are a few nonspecific air-fluid levels within the nondilated small bowel loops. Fluid-filled, otherwise normal appearing appendix within the right lower quadrant (series 4, image 97, for example).   VESSELS: Severe atherosclerotic calcification of the infrarenal abdominal aorta and iliac arteries. Ectasia of the infrarenal abdominal aorta measuring up to 2.9 cm in caliber.   PERITONEUM/RETROPERITONEUM/LYMPH NODES: No ascites or free air, no fluid collection.  No enlarged or suspicious lymph nodes.   ABDOMINAL WALL: Obesity. Fatty atrophy of the posterior paraspinal musculature. Tiny fat containing umbilical hernia.   BONES: No suspicious osseous lesions are identified. Diffusely  decreased bone mineralization. Sclerosis of the bilateral SI joints. Extensive lower lumbar facet arthropathy with degenerative anterolisthesis of L4 on L5 and rotatory levo scoliosis of the thoracolumbar spine.       1. Sigmoid colonic diverticulosis with mild surrounding inflammatory fat stranding concerning for acute uncomplicated sigmoid colonic diverticulitis. There is mild upstream gaseous distention of the colon with air-fluid levels that may reflect an element of ileus or colitis. No evidence to indicate bowel obstruction. 2. Severe atherosclerosis with ectasia of the infrarenal abdominal aorta measuring up to 2.9 cm in caliber. 3. Bronchiectasis and peripheral mucous plugging within the lung bases. 4. Lumbar degenerative change. 5. Additional chronic findings as above.   MACRO: None   Signed by: Aris Malone 11/30/2024 10:42 AM Dictation workstation:   [id number]    XR chest 1 view    Result Date: 11/30/2024  Interpreted By:  Rekha Rojas, STUDY: XR CHEST 1 VIEW;  11/30/2024 7:57 am   INDICATION: Signs/Symptoms:sob.     COMPARISON: 12/08/2022   ACCESSION NUMBER(S): [id number]   ORDERING CLINICIAN: JORGE ASHBY   FINDINGS: Artifact from overlying monitoring leads. Right hilar fullness and perihilar interstitial prominence similar to the previous exam. No focal airspace consolidation or pleural effusion. The cardiac silhouette is within normal limits for size. Distended gas-filled stomach beneath the left diaphragm.       Perihilar interstitial prominence.   MACRO: None.   Signed by: Rekha Rojas 11/30/2024 8:24 AM Dictation workstation:   [id number]        Assessment/Plan   Encephalopathy-toxic metabolic versus infectious, slowly resolving  Abnormal CT abdomen and pelvis-sigmoid diverticulitis  Bronchiectasis  Acute hypoxic respiratory failure  MRSA colonization of nares  Resolving leukocytosis          IV Zosyn  IV ceftaroline-avoiding vancomycin-coverage for MRSA  Decolonize per protocol  Monitor  renal function  Oxygen as needed  Supportive care  Monitor temperature and WBC  Evaluate for de-escalation in 1 to 2 days    Jose Alberto Spence MD

## 2024-12-04 LAB
ALBUMIN SERPL BCP-MCNC: 2.2 G/DL (ref 3.4–5)
ANION GAP SERPL CALCULATED.3IONS-SCNC: 12 MMOL/L (ref 10–20)
BACTERIA BLD CULT: NORMAL
BACTERIA BLD CULT: NORMAL
BUN SERPL-MCNC: 57 MG/DL (ref 6–23)
CALCIUM SERPL-MCNC: 8 MG/DL (ref 8.6–10.3)
CHLORIDE SERPL-SCNC: 107 MMOL/L (ref 98–107)
CO2 SERPL-SCNC: 27 MMOL/L (ref 21–32)
CREAT SERPL-MCNC: 1.22 MG/DL (ref 0.5–1.05)
EGFRCR SERPLBLD CKD-EPI 2021: 44 ML/MIN/1.73M*2
ERYTHROCYTE [DISTWIDTH] IN BLOOD BY AUTOMATED COUNT: 17.2 % (ref 11.5–14.5)
GLUCOSE SERPL-MCNC: 129 MG/DL (ref 74–99)
HCT VFR BLD AUTO: 30.8 % (ref 36–46)
HEMOCCULT SP1 STL QL: POSITIVE
HGB BLD-MCNC: 9.1 G/DL (ref 12–16)
MCH RBC QN AUTO: 27.2 PG (ref 26–34)
MCHC RBC AUTO-ENTMCNC: 29.5 G/DL (ref 32–36)
MCV RBC AUTO: 92 FL (ref 80–100)
NRBC BLD-RTO: 0 /100 WBCS (ref 0–0)
PHOSPHATE SERPL-MCNC: 2.4 MG/DL (ref 2.5–4.9)
PLATELET # BLD AUTO: 414 X10*3/UL (ref 150–450)
POTASSIUM SERPL-SCNC: 3.8 MMOL/L (ref 3.5–5.3)
RBC # BLD AUTO: 3.34 X10*6/UL (ref 4–5.2)
SODIUM SERPL-SCNC: 142 MMOL/L (ref 136–145)
WBC # BLD AUTO: 13.4 X10*3/UL (ref 4.4–11.3)

## 2024-12-04 PROCEDURE — 36415 COLL VENOUS BLD VENIPUNCTURE: CPT | Performed by: INTERNAL MEDICINE

## 2024-12-04 PROCEDURE — 2500000002 HC RX 250 W HCPCS SELF ADMINISTERED DRUGS (ALT 637 FOR MEDICARE OP, ALT 636 FOR OP/ED): Performed by: INTERNAL MEDICINE

## 2024-12-04 PROCEDURE — 2500000001 HC RX 250 WO HCPCS SELF ADMINISTERED DRUGS (ALT 637 FOR MEDICARE OP): Performed by: INTERNAL MEDICINE

## 2024-12-04 PROCEDURE — 2500000005 HC RX 250 GENERAL PHARMACY W/O HCPCS: Performed by: INTERNAL MEDICINE

## 2024-12-04 PROCEDURE — 2500000004 HC RX 250 GENERAL PHARMACY W/ HCPCS (ALT 636 FOR OP/ED): Mod: JZ | Performed by: INTERNAL MEDICINE

## 2024-12-04 PROCEDURE — 2500000004 HC RX 250 GENERAL PHARMACY W/ HCPCS (ALT 636 FOR OP/ED): Performed by: INTERNAL MEDICINE

## 2024-12-04 PROCEDURE — 2060000001 HC INTERMEDIATE ICU ROOM DAILY

## 2024-12-04 PROCEDURE — 92526 ORAL FUNCTION THERAPY: CPT | Mod: GN | Performed by: SPEECH-LANGUAGE PATHOLOGIST

## 2024-12-04 PROCEDURE — 84100 ASSAY OF PHOSPHORUS: CPT | Performed by: INTERNAL MEDICINE

## 2024-12-04 PROCEDURE — 94640 AIRWAY INHALATION TREATMENT: CPT

## 2024-12-04 PROCEDURE — 80069 RENAL FUNCTION PANEL: CPT | Performed by: INTERNAL MEDICINE

## 2024-12-04 PROCEDURE — 85027 COMPLETE CBC AUTOMATED: CPT | Performed by: INTERNAL MEDICINE

## 2024-12-04 PROCEDURE — 82270 OCCULT BLOOD FECES: CPT | Performed by: INTERNAL MEDICINE

## 2024-12-04 PROCEDURE — 94760 N-INVAS EAR/PLS OXIMETRY 1: CPT

## 2024-12-04 ASSESSMENT — PAIN - FUNCTIONAL ASSESSMENT
PAIN_FUNCTIONAL_ASSESSMENT: 0-10

## 2024-12-04 ASSESSMENT — COGNITIVE AND FUNCTIONAL STATUS - GENERAL
MOVING FROM LYING ON BACK TO SITTING ON SIDE OF FLAT BED WITH BEDRAILS: TOTAL
TURNING FROM BACK TO SIDE WHILE IN FLAT BAD: TOTAL
TOILETING: TOTAL
MOVING FROM LYING ON BACK TO SITTING ON SIDE OF FLAT BED WITH BEDRAILS: TOTAL
MOVING TO AND FROM BED TO CHAIR: TOTAL
STANDING UP FROM CHAIR USING ARMS: TOTAL
WALKING IN HOSPITAL ROOM: TOTAL
MOBILITY SCORE: 6
EATING MEALS: TOTAL
MOVING FROM LYING ON BACK TO SITTING ON SIDE OF FLAT BED WITH BEDRAILS: TOTAL
TURNING FROM BACK TO SIDE WHILE IN FLAT BAD: TOTAL
DRESSING REGULAR UPPER BODY CLOTHING: TOTAL
DRESSING REGULAR LOWER BODY CLOTHING: TOTAL
DRESSING REGULAR UPPER BODY CLOTHING: TOTAL
DRESSING REGULAR LOWER BODY CLOTHING: TOTAL
MOVING TO AND FROM BED TO CHAIR: TOTAL
WALKING IN HOSPITAL ROOM: TOTAL
DAILY ACTIVITIY SCORE: 6
HELP NEEDED FOR BATHING: TOTAL
MOBILITY SCORE: 6
TOILETING: TOTAL
STANDING UP FROM CHAIR USING ARMS: TOTAL
PERSONAL GROOMING: TOTAL
TURNING FROM BACK TO SIDE WHILE IN FLAT BAD: TOTAL
DRESSING REGULAR UPPER BODY CLOTHING: TOTAL
PERSONAL GROOMING: TOTAL
STANDING UP FROM CHAIR USING ARMS: TOTAL
TOILETING: TOTAL
EATING MEALS: TOTAL
CLIMB 3 TO 5 STEPS WITH RAILING: TOTAL
MOVING TO AND FROM BED TO CHAIR: TOTAL
HELP NEEDED FOR BATHING: TOTAL
MOBILITY SCORE: 6
WALKING IN HOSPITAL ROOM: TOTAL
EATING MEALS: TOTAL
DAILY ACTIVITIY SCORE: 6
CLIMB 3 TO 5 STEPS WITH RAILING: TOTAL
CLIMB 3 TO 5 STEPS WITH RAILING: TOTAL
HELP NEEDED FOR BATHING: TOTAL
DRESSING REGULAR LOWER BODY CLOTHING: TOTAL
DAILY ACTIVITIY SCORE: 6
PERSONAL GROOMING: TOTAL

## 2024-12-04 ASSESSMENT — PAIN SCALES - GENERAL
PAINLEVEL_OUTOF10: 0 - NO PAIN
PAINLEVEL_OUTOF10: 1
PAINLEVEL_OUTOF10: 1
PAINLEVEL_OUTOF10: 0 - NO PAIN
PAINLEVEL_OUTOF10: 0 - NO PAIN

## 2024-12-04 ASSESSMENT — PAIN DESCRIPTION - DESCRIPTORS: DESCRIPTORS: ACHING

## 2024-12-04 NOTE — PROGRESS NOTES
Amrita Lopez is a 84 y.o. female on day 4 of admission presenting with Shortness of breath.    Subjective   Interval History:   Afebrile  Awake, nonverbal        Review of Systems   Unable to perform ROS: Patient nonverbal       Objective   Range of Vitals (last 24 hours)  Heart Rate:  [76-80]   Temp:  [36.1 °C (97 °F)-37 °C (98.6 °F)]   Resp:  [14-23]   BP: (124-141)/(49-79)   Weight:  [120 kg (265 lb 6.9 oz)]   SpO2:  [94 %-100 %]   Daily Weight  12/04/24 : 120 kg (265 lb 6.9 oz)    Body mass index is 45.56 kg/m².    Physical Exam  HENT:      Head: Normocephalic and atraumatic.      Nose: Nose normal.   Eyes:      General: No scleral icterus.     Conjunctiva/sclera: Conjunctivae normal.   Cardiovascular:      Rate and Rhythm: Normal rate and regular rhythm.   Pulmonary:      Breath sounds: Decreased breath sounds present.   Abdominal:      General: Bowel sounds are normal.      Palpations: Abdomen is soft.   Musculoskeletal:      Cervical back: Normal range of motion and neck supple.      Right lower leg: No edema.      Left lower leg: No edema.   Skin:     General: Skin is warm and dry.   Neurological:      Mental Status: She is awake, confused  Psychiatric:      Comments: Awake, confused    Antibiotics  ceftaroline (Teflaro) IV in 50 mL D5W  mupirocin - 2 %  piperacillin-tazobactam - 2.25 gram/50 mL    Relevant Results  Labs  Results from last 72 hours   Lab Units 12/04/24  0447 12/03/24  0721 12/02/24  0440   WBC AUTO x10*3/uL 13.4* 13.0* 17.2*   HEMOGLOBIN g/dL 9.1* 9.3* 9.7*   HEMATOCRIT % 30.8* 29.9* 30.0*   PLATELETS AUTO x10*3/uL 414 424 451*   NEUTROS PCT AUTO %  --  76.2  --    LYMPHS PCT AUTO %  --  12.9  --    MONOS PCT AUTO %  --  5.4  --    EOS PCT AUTO %  --  0.3  --      Results from last 72 hours   Lab Units 12/04/24  0447 12/03/24  0721 12/02/24  1713 12/02/24  0440   SODIUM mmol/L 142 148* 147* 142   POTASSIUM mmol/L 3.8 3.4* 3.9 2.9*   CHLORIDE mmol/L 107 111* 107 104   CO2 mmol/L 27 30 30  "26   BUN mg/dL 57* 71* 88* 102*   CREATININE mg/dL 1.22* 1.28* 1.54* 2.00*   GLUCOSE mg/dL 129* 114* 118* 171*   CALCIUM mg/dL 8.0* 8.0* 8.2* 8.0*   ANION GAP mmol/L 12 10 14 15   EGFR mL/min/1.73m*2 44* 42* 34* 25*   PHOSPHORUS mg/dL 2.4*  --  3.3 4.2     Results from last 72 hours   Lab Units 12/04/24  0447 12/02/24  1713 12/02/24 0440   ALBUMIN g/dL 2.2* 2.1* 2.0*     Estimated Creatinine Clearance: 45.3 mL/min (A) (by C-G formula based on SCr of 1.22 mg/dL (H)).  No results found for: \"CRP\"  Microbiology  Reviewed-oral culture and urine  Imaging  ECG 12 Lead    Result Date: 12/2/2024  Normal sinus rhythm Possible Inferior infarct , age undetermined Abnormal ECG No previous ECGs available    CT abdomen pelvis wo IV contrast    Result Date: 11/30/2024  Interpreted By:  Aris Malone, STUDY: CT ABDOMEN PELVIS WO IV CONTRAST;  11/30/2024 10:09 am   INDICATION: Signs/Symptoms:buttock pain, wound, eval for abscess vs other. Acute renal injury/no contrast..   COMPARISON: CT abdomen and pelvis dated 12/08/2022.   ACCESSION NUMBER(S): [id number]   ORDERING CLINICIAN: JORGE ASHBY   TECHNIQUE: CT of the abdomen and pelvis was performed. Contiguous axial images were obtained at 3 mm slice thickness through the abdomen and pelvis. Coronal and sagittal reconstructions at 3 mm slice thickness were performed.  No intravenous contrast was administered.   Assessment of the abdominopelvic viscera is also limited by beam hardening artifact related to patient body habitus and positioning of the extremities.   FINDINGS: Please note that the evaluation of vessels, lymph nodes and organs is limited without intravenous contrast.   LOWER CHEST: Mild bibasilar scarring/atelectasis. Peripheral bronchiectasis and mucous plugging within the visualized lower lobes. Calcification of the mitral valve annulus. Aortic valve calcifications also suggested. Mild coronary artery calcifications within the visualized regions.   ABDOMEN:   LIVER: " The liver demonstrates homogeneous attenuation without evidence of focal liver lesions.   BILE DUCTS: The intrahepatic and extrahepatic ducts are not dilated.   GALLBLADDER: No calcified stones. No wall thickening.   PANCREAS: Mild fatty infiltration of the pancreatic parenchyma. Otherwise within normal limits.   SPLEEN: Within normal limits.   ADRENAL GLANDS: Bilateral adrenal glands appear normal.   KIDNEYS AND URETERS: The kidneys are normal in size and unremarkable in appearance. Hydronephrosis or hydroureter. Questionable punctate nonobstructing right renal calculus (series 4, image 52).   PELVIS:   BLADDER: Ariza catheter within a collapsed bladder, limiting evaluation.   REPRODUCTIVE ORGANS: Uterus and ovaries are unremarkable in CT appearance.   BOWEL: Small hiatal hernia. The stomach is otherwise unremarkable. Sigmoid colonic diverticulosis. There is mild element of fat stranding within this region (series 4, image 112),  possibly representing a mild element of acute uncomplicated sigmoid diverticulitis. There is mild upstream gaseous distention of the colon with air-fluid levels that could reflect an element of ileus or colitis. Similarly there are a few nonspecific air-fluid levels within the nondilated small bowel loops. Fluid-filled, otherwise normal appearing appendix within the right lower quadrant (series 4, image 97, for example).   VESSELS: Severe atherosclerotic calcification of the infrarenal abdominal aorta and iliac arteries. Ectasia of the infrarenal abdominal aorta measuring up to 2.9 cm in caliber.   PERITONEUM/RETROPERITONEUM/LYMPH NODES: No ascites or free air, no fluid collection.  No enlarged or suspicious lymph nodes.   ABDOMINAL WALL: Obesity. Fatty atrophy of the posterior paraspinal musculature. Tiny fat containing umbilical hernia.   BONES: No suspicious osseous lesions are identified. Diffusely decreased bone mineralization. Sclerosis of the bilateral SI joints. Extensive lower  lumbar facet arthropathy with degenerative anterolisthesis of L4 on L5 and rotatory levo scoliosis of the thoracolumbar spine.       1. Sigmoid colonic diverticulosis with mild surrounding inflammatory fat stranding concerning for acute uncomplicated sigmoid colonic diverticulitis. There is mild upstream gaseous distention of the colon with air-fluid levels that may reflect an element of ileus or colitis. No evidence to indicate bowel obstruction. 2. Severe atherosclerosis with ectasia of the infrarenal abdominal aorta measuring up to 2.9 cm in caliber. 3. Bronchiectasis and peripheral mucous plugging within the lung bases. 4. Lumbar degenerative change. 5. Additional chronic findings as above.   MACRO: None   Signed by: Aris Malone 11/30/2024 10:42 AM Dictation workstation:   [id number]    XR chest 1 view    Result Date: 11/30/2024  Interpreted By:  Rekha Rojas, STUDY: XR CHEST 1 VIEW;  11/30/2024 7:57 am   INDICATION: Signs/Symptoms:sob.     COMPARISON: 12/08/2022   ACCESSION NUMBER(S): [id number]   ORDERING CLINICIAN: JORGE ASHBY   FINDINGS: Artifact from overlying monitoring leads. Right hilar fullness and perihilar interstitial prominence similar to the previous exam. No focal airspace consolidation or pleural effusion. The cardiac silhouette is within normal limits for size. Distended gas-filled stomach beneath the left diaphragm.       Perihilar interstitial prominence.   MACRO: None.   Signed by: Rekha Rojas 11/30/2024 8:24 AM Dictation workstation:   [id number]     Assessment/Plan   Encephalopathy-toxic metabolic versus infectious, slowly resolving  Abnormal CT abdomen and pelvis-sigmoid diverticulitis  Bronchiectasis  Acute hypoxic respiratory failure  MRSA colonization of nares  Resolving leukocytosis           IV Zosyn  IV ceftaroline-avoiding vancomycin-coverage for MRSA  Decolonize per protocol  Monitor renal function  Oxygen as needed  Supportive care  Monitor temperature and WBC  Evaluate  for de-escalation tomorrow-Augmentin and doxycycline to complete total of 7 days    Jose Alberto Spence MD

## 2024-12-04 NOTE — CARE PLAN
Problem: Skin  Goal: Decreased wound size/increased tissue granulation at next dressing change  Outcome: Progressing  Flowsheets (Taken 12/4/2024 0733)  Decreased wound size/increased tissue granulation at next dressing change:   Promote sleep for wound healing   Protective dressings over bony prominences   Utilize specialty bed per algorithm  Goal: Participates in plan/prevention/treatment measures  Outcome: Progressing  Flowsheets (Taken 12/4/2024 0733)  Participates in plan/prevention/treatment measures:   Discuss with provider PT/OT consult   Elevate heels   Increase activity/out of bed for meals  Goal: Prevent/manage excess moisture  Outcome: Progressing  Flowsheets (Taken 12/4/2024 0733)  Prevent/manage excess moisture:   Cleanse incontinence/protect with barrier cream   Monitor for/manage infection if present   Follow provider orders for dressing changes   Use wicking fabric (obtain order)   Moisturize dry skin  Goal: Prevent/minimize sheer/friction injuries  Outcome: Progressing  Flowsheets (Taken 12/4/2024 0733)  Prevent/minimize sheer/friction injuries:   Complete micro-shifts as needed if patient unable. Adjust patient position to relieve pressure points, not a full turn   Increase activity/out of bed for meals   Use pull sheet   HOB 30 degrees or less   Turn/reposition every 2 hours/use positioning/transfer devices   Utilize specialty bed per algorithm  Goal: Promote/optimize nutrition  Outcome: Progressing  Flowsheets (Taken 12/4/2024 0733)  Promote/optimize nutrition:   Assist with feeding   Monitor/record intake including meals   Offer water/supplements/favorite foods   Consume > 50% meals/supplements   Discuss with provider if NPO > 2 days   Reassess MST if dietician not consulted  Goal: Promote skin healing  Outcome: Progressing  Flowsheets (Taken 12/4/2024 0733)  Promote skin healing:   Assess skin/pad under line(s)/device(s)   Protective dressings over bony prominences   Turn/reposition every 2  hours/use positioning/transfer devices   Rotate device position/do not position patient on device   Ensure correct size (line/device) and apply per  instructions     Problem: Fall/Injury  Goal: Not fall by end of shift  Outcome: Progressing  Goal: Be free from injury by end of the shift  Outcome: Progressing  Goal: Verbalize understanding of personal risk factors for fall in the hospital  Outcome: Progressing  Goal: Verbalize understanding of risk factor reduction measures to prevent injury from fall in the home  Outcome: Progressing  Goal: Use assistive devices by end of the shift  Outcome: Progressing  Goal: Pace activities to prevent fatigue by end of the shift  Outcome: Progressing     Problem: Respiratory  Goal: Minimize anxiety/maximize coping throughout shift  Outcome: Progressing  Goal: Minimal/no exertional discomfort or dyspnea this shift  Outcome: Progressing  Goal: No signs of respiratory distress (eg. Use of accessory muscles. Peds grunting)  Outcome: Progressing     Problem: Pain - Adult  Goal: Verbalizes/displays adequate comfort level or baseline comfort level  Outcome: Progressing     Problem: Safety - Adult  Goal: Free from fall injury  Outcome: Progressing     Problem: Discharge Planning  Goal: Discharge to home or other facility with appropriate resources  Outcome: Progressing     Problem: Chronic Conditions and Co-morbidities  Goal: Patient's chronic conditions and co-morbidity symptoms are monitored and maintained or improved  Outcome: Progressing     Problem: Nutrition  Goal: Oral intake greater 75%  Outcome: Progressing   The patient's goals for the shift include      The clinical goals for the shift include Control pain and wound care    Over the shift, the patient did not make progress toward the following goals. Barriers to progression include . Recommendations to address these barriers include .

## 2024-12-04 NOTE — PROGRESS NOTES
"Inpatient Speech Language Pathology Dysphagia Treatment note     Patient Name: Amrita Lopez  MRN: [id number]  : 1940  Today's Date: 24  Time Calculation  Start Time: 1000  Stop Time: 1030  Time Calculation (min): 30 min       Total Number of Visits: 2/3 sw, MON    PLAN:  Skilled speech therapy for dysphagia treatment continues to be warranted to assess tolerance of diet , to determine ability to upgrade diet after PO trials with SLP  SLP TX Plan: Continue Plan of Care  SLP Frequency: 3x per week  Discussed POC: Patient  Discussed Risks/Benefits: Yes, Patient  Patient/Caregiver Agreeable: Yes       Recommended Diet:   Solid Diet Recommendations: Soft & bite sized/chopped (IDDSI Level 6)  Liquid Diet Recommendations: Thin (IDDSI Level 0)  Compensatory strategies: upright 90 degrees for intake , supervision during meals  Medication administration: crushed in applesauce    Subjective:  Pt. Seen at bedside for skilled dysphagia treatment. She had limited communicative ability her communication was limited to gestures and limited verbal output characterized by head nods for yes, head shakes for no, \"on\", and \"hot\".   Pain:  Pain Assessment  Pain Assessment: 0-10  0-10 (Numeric) Pain Score: 0 - No pain         Oxygen Status:   room air             Goals:   Dysphagia Goals: (Goals Initiated 12/3/24 Target Date: 2 weeks)  LTG: Pt will tolerate recommended diet without overt s/s of aspiration (I.e coughing or choking) in order to meet nutrition and hydration standards.      STG 1: Pt will tolerate current diet recommendation without overt s/s of aspiration (I.e. coughing/choking) with 90% accuracy during observed PO trials.   Goals Initiated 12/3/24 Target Date: 2 weeks  Status: Pt participated in PO intake of her meal tray with eggs, potatoes, and sausage in which she tolerated without overt s/s of aspiration (I.e coughing and choking) with 100% accuracy and 1:1 feeding assistance.              Progress this " Visit: Goal Met --continue to assess diet binh  STG 2: Pt will participate in PO trials with upgraded diet textures without overt s/s of aspiration with 80% accuracy.  Goals Initiated 12/3/24 Target Date: 2 weeks  Status: Pt declined trials of solid textures of araseil cracker or saltine cracker at this time.               Progress this Visit: N/A  STG 3: Pt will participate in oral care to increase oral moisture, hygiene and to increase adequacy of swallow function.   Goals Initiated 12/3/24 Target Date: 2 weeks  Status: Pt participated in oral care with assistance from S-SLP. Used toothette submerged in mouthwash and water to clean oral cavity. Important to note that her mucosa and oral cavity were in better shape than yesterday evident by absence of dried dark substances.               Progress this Visit: Progressing        SLP Assessment:  Pt was seen at bedside for dysphagia tx. She was awake and alert upon SLP arrival. At the beginning of tx she refused trials of applesauce, araseli crackers but participated in PO intake of orange juice and water with a straw. Meal tray was delivered during session. Pt participated in 1:1 feeding assist with her potatoes and sausage but independently manipulated the orange juice with a straw as needed. Pt tolerated current diet level of soft & bite-sized/chopped (IDDSI Level 7) and thin liquids (IDDSI Level 0). At this time, skilled tx continues to be warranted to assess diet tolerance and to determine if it is appropriate to upgrade diet level.        Treatment Outcome:  SLP TX Intervention Outcome: Making Progress Towards Goals    Treatment Tolerance: Patient tolerated treatment well     Education:  Pt. Given skilled instruction on compensatory strategies while eating and importance of oral care.  Pt. was unable to demonstrate understanding, needs further instruction  (pt shows impaired cognition eviednt by her limited response ability when asked questions; continue tx is  warranted)    Disclaimer: This was completed in collaboration with supervising therapist, Corinne Casey M.A. CCC-SLP

## 2024-12-04 NOTE — PROGRESS NOTES
Amrita Lopez is a 84 y.o. female on day 3 of admission presenting with Shortness of breath.    Subjective   The patient was seen and examined.  Lying in the bed.  Comfortable.  Not in acute distress.  Patient more alert as compared to before.       Objective     Physical Exam  HEENT:  Head externally atraumatic,  extraocular movements intact, oral mucosa moist  Neck:  Supple, no JVP, no palpable adenopathy or thyromegaly.  No carotid bruit.  Chest:  Clear to auscultation and resonant.  Heart:  Regular rate and rhythm, no murmur or gallop could be appreciated.  Abdomen:  Soft, nontender, bowel sounds present, normoactive, no palpable hepatosplenomegaly.  Extremities:  No edema, pulses present, no cyanosis or clubbing.  CNS:  Patient alert, oriented to time, place and person.    No new deficit.  Cranial nerves 2-12 grossly intact  Skin:  No active rash.  Musculoskeletal:  No  apparent joint swelling or erythema, range of movement normal.  Last Recorded Vitals  Heart Rate:  [80]   Temp:  [36.3 °C (97.3 °F)-36.7 °C (98.1 °F)]   Resp:  [18]   BP: (124-155)/(49-81)   Weight:  [120 kg (264 lb)]   SpO2:  [94 %-96 %]     Intake/Output last 3 Shifts:  I/O last 3 completed shifts:  In: 2203.2 (18.4 mL/kg) [I.V.:2203.2 (18.4 mL/kg)]  Out: 2100 (17.5 mL/kg) [Urine:2100 (0.5 mL/kg/hr)]  Weight: 119.7 kg     Relevant Results  No results found for the last 90 days.    Results for orders placed or performed during the hospital encounter of 11/30/24 (from the past 24 hours)   Vancomycin   Result Value Ref Range    Vancomycin 10.2 5.0 - 20.0 ug/mL   CBC and Auto Differential   Result Value Ref Range    WBC 13.0 (H) 4.4 - 11.3 x10*3/uL    nRBC 0.0 0.0 - 0.0 /100 WBCs    RBC 3.39 (L) 4.00 - 5.20 x10*6/uL    Hemoglobin 9.3 (L) 12.0 - 16.0 g/dL    Hematocrit 29.9 (L) 36.0 - 46.0 %    MCV 88 80 - 100 fL    MCH 27.4 26.0 - 34.0 pg    MCHC 31.1 (L) 32.0 - 36.0 g/dL    RDW 16.8 (H) 11.5 - 14.5 %    Platelets 424 150 - 450 x10*3/uL     Neutrophils % 76.2 40.0 - 80.0 %    Immature Granulocytes %, Automated 4.9 (H) 0.0 - 0.9 %    Lymphocytes % 12.9 13.0 - 44.0 %    Monocytes % 5.4 2.0 - 10.0 %    Eosinophils % 0.3 0.0 - 6.0 %    Basophils % 0.3 0.0 - 2.0 %    Neutrophils Absolute 9.88 (H) 1.60 - 5.50 x10*3/uL    Immature Granulocytes Absolute, Automated 0.63 (H) 0.00 - 0.50 x10*3/uL    Lymphocytes Absolute 1.67 0.80 - 3.00 x10*3/uL    Monocytes Absolute 0.70 0.05 - 0.80 x10*3/uL    Eosinophils Absolute 0.04 0.00 - 0.40 x10*3/uL    Basophils Absolute 0.04 0.00 - 0.10 x10*3/uL   Basic Metabolic Panel   Result Value Ref Range    Glucose 114 (H) 74 - 99 mg/dL    Sodium 148 (H) 136 - 145 mmol/L    Potassium 3.4 (L) 3.5 - 5.3 mmol/L    Chloride 111 (H) 98 - 107 mmol/L    Bicarbonate 30 21 - 32 mmol/L    Anion Gap 10 10 - 20 mmol/L    Urea Nitrogen 71 (H) 6 - 23 mg/dL    Creatinine 1.28 (H) 0.50 - 1.05 mg/dL    eGFR 42 (L) >60 mL/min/1.73m*2    Calcium 8.0 (L) 8.6 - 10.3 mg/dL        Current Facility-Administered Medications:     acetaminophen (Tylenol) tablet 650 mg, 650 mg, oral, q4h PRN **OR** acetaminophen (Tylenol) oral liquid 650 mg, 650 mg, oral, q4h PRN, 650 mg at 12/03/24 1447 **OR** acetaminophen (Tylenol) suppository 650 mg, 650 mg, rectal, q4h PRN, Arnoldo Sanchez MD    albuterol 2.5 mg /3 mL (0.083 %) nebulizer solution 2.5 mg, 2.5 mg, nebulization, q2h PRN, Arnoldo Sanchez MD    ammonium lactate (Lac-Hydrin) 12 % lotion 1 Application, 1 Application, Topical, BID, Arnoldo Sanchez MD, 1 Application at 12/03/24 0830    aspirin chewable tablet 81 mg, 81 mg, oral, Daily, Arnoldo Sanchez MD, 81 mg at 12/03/24 0951    atorvastatin (Lipitor) tablet 10 mg, 10 mg, oral, Nightly, Arnoldo Sanchez MD    benzocaine-menthol (Cepastat Sore Throat) lozenge 1 lozenge, 1 lozenge, Mouth/Throat, q2h PRN, Arnoldo Sanchez MD    ceftaroline (Teflaro) 300 mg in dextrose 5% 50 mL IV, 300 mg, intravenous, q12h, Jose Alberto Spence MD, Last  Rate: 60 mL/hr at 12/03/24 1748, 300 mg at 12/03/24 1748    cetirizine (ZyrTEC) tablet 10 mg, 10 mg, oral, Daily, Arnoldo Sanchez MD, 10 mg at 12/03/24 0950    chlorhexidine (Peridex) 0.12 % solution 15 mL, 15 mL, Mouth/Throat, BID, Jose Alberto Spence MD, 15 mL at 12/03/24 0951    cholecalciferol (Vitamin D-3) tablet 5,000 Units, 5,000 Units, oral, Daily, Arnoldo Sanchez MD, 5,000 Units at 12/03/24 0951    clopidogrel (Plavix) tablet 75 mg, 75 mg, oral, Daily, Arnoldo Sanchez MD, 75 mg at 12/03/24 0951    clotrimazole (Mycelex) adeline 10 mg, 10 mg, oral, 5x daily, Jose Alberto Spence MD, 10 mg at 12/03/24 1545    cyanocobalamin (Vitamin B-12) tablet 500 mcg, 500 mcg, oral, Daily, Arnoldo Sanchez MD, 500 mcg at 12/03/24 0951    dextromethorphan-guaifenesin (Robitussin DM)  mg/5 mL oral liquid 5 mL, 5 mL, oral, q4h PRN, Arnoldo Sanchez MD    dextrose 5 % with KCl 20 mEq/L infusion, 70 mL/hr, intravenous, Continuous, Meaghan Choe MD, Last Rate: 70 mL/hr at 12/03/24 1235, 70 mL/hr at 12/03/24 1235    guaiFENesin (Mucinex) 12 hr tablet 600 mg, 600 mg, oral, q12h PRN, Arnoldo Sanchez MD    ipratropium-albuteroL (Duo-Neb) 0.5-2.5 mg/3 mL nebulizer solution 3 mL, 3 mL, nebulization, TID, Arnoldo Sanchez MD, 3 mL at 12/03/24 1848    loperamide (Imodium) capsule 2 mg, 2 mg, oral, 4x daily PRN, Arnoldo Sanchez MD    menthol-zinc oxide (Calmoseptine - Risamine) 0.44-20.6 % ointment, , Topical, 4x daily PRN, Arnoldo Sanchez MD    mupirocin (Bactroban) 2 % ointment, , Topical, BID, Jose Alberto Spence MD, Given at 12/03/24 0900    nystatin (Mycostatin) 100,000 unit/gram powder 1 Application, 1 Application, Topical, BID, Arnoldo Sanchez MD, 1 Application at 12/03/24 0830    ondansetron ODT (Zofran-ODT) disintegrating tablet 4 mg, 4 mg, oral, q8h PRN, Arnoldo Sanchez MD    oxygen (O2) therapy, , inhalation, q8h, Arnoldo Sanchez MD, 2 L/min at 12/03/24 1546    pantoprazole  (ProtoNix) EC tablet 40 mg, 40 mg, oral, Daily before breakfast, Arnoldo Sanchez MD, 40 mg at 12/02/24 0618    piperacillin-tazobactam (Zosyn) 2.25 g in dextrose (iso) IV 50 mL, 2.25 g, intravenous, q6h, Jose Alberto Spence MD, Last Rate: 0 mL/hr at 12/03/24 1223, 2.25 g at 12/03/24 1748    polyethylene glycol (Glycolax, Miralax) packet 17 g, 17 g, oral, Daily PRN, Arnoldo Sanchez MD    sennosides (Senokot) tablet 17.2 mg, 2 tablet, oral, BID, Arnoldo Sanchez MD    [Held by provider] topiramate (Topamax) tablet 50 mg, 50 mg, oral, Daily, Arnoldo Sanchez MD   Assessment/Plan   Principal Problem:    Shortness of breath  Toxic and metabolic encephalopathy  Acute renal failure  Metabolic acidosis  Oral thrush  Severe protein calorie malnutrition  GERD  Respiratory failure  Morbid obesity  Sepsis  Hyperuricemia continue current medication.  Supportive care.  Physical therapy and Occupational Therapy.  Renal failure is improving.  Hyperuricemia is also improving.  Patient has hypokalemia.  Nephrology on consult.  Pressure is under controlled.  Continue with IV antibiotics.         Arnoldo Sanchez MD

## 2024-12-04 NOTE — PROGRESS NOTES
Acute kidney injury is resolved, creatinine much improved, hypernatremia is also resolved, stop D5W and monitor.  Following along.    Meaghan Choe MD

## 2024-12-04 NOTE — PROGRESS NOTES
12/04/24 0849   Discharge Planning   Home or Post Acute Services Post acute facilities (Rehab/SNF/etc)   Type of Post Acute Facility Services Long term care  (([facility].  No barriers to return.  Patient can return when medically ready)   Expected Discharge Disposition Inter   Does the patient need discharge transport arranged? Yes   RoundTrip coordination needed? Yes   Has discharge transport been arranged? No

## 2024-12-05 LAB
ALBUMIN SERPL BCP-MCNC: 2.2 G/DL (ref 3.4–5)
ANION GAP SERPL CALCULATED.3IONS-SCNC: 12 MMOL/L (ref 10–20)
ATRIAL RATE: 95 BPM
BACTERIA SPEC CULT: ABNORMAL
BACTERIA SPEC CULT: ABNORMAL
BUN SERPL-MCNC: 40 MG/DL (ref 6–23)
CALCIUM SERPL-MCNC: 8.1 MG/DL (ref 8.6–10.3)
CHLORIDE SERPL-SCNC: 106 MMOL/L (ref 98–107)
CO2 SERPL-SCNC: 28 MMOL/L (ref 21–32)
CREAT SERPL-MCNC: 1.11 MG/DL (ref 0.5–1.05)
EGFRCR SERPLBLD CKD-EPI 2021: 50 ML/MIN/1.73M*2
ERYTHROCYTE [DISTWIDTH] IN BLOOD BY AUTOMATED COUNT: 17.7 % (ref 11.5–14.5)
GLUCOSE SERPL-MCNC: 98 MG/DL (ref 74–99)
GRAM STN SPEC: ABNORMAL
HCT VFR BLD AUTO: 30.9 % (ref 36–46)
HGB BLD-MCNC: 9.1 G/DL (ref 12–16)
MCH RBC QN AUTO: 27.5 PG (ref 26–34)
MCHC RBC AUTO-ENTMCNC: 29.4 G/DL (ref 32–36)
MCV RBC AUTO: 93 FL (ref 80–100)
NRBC BLD-RTO: 0 /100 WBCS (ref 0–0)
P AXIS: 56 DEGREES
P OFFSET: 196 MS
P ONSET: 141 MS
PHOSPHATE SERPL-MCNC: 2.9 MG/DL (ref 2.5–4.9)
PLATELET # BLD AUTO: 432 X10*3/UL (ref 150–450)
POTASSIUM SERPL-SCNC: 3.8 MMOL/L (ref 3.5–5.3)
PR INTERVAL: 150 MS
Q ONSET: 216 MS
QRS COUNT: 16 BEATS
QRS DURATION: 84 MS
QT INTERVAL: 358 MS
QTC CALCULATION(BAZETT): 449 MS
QTC FREDERICIA: 417 MS
R AXIS: 48 DEGREES
RBC # BLD AUTO: 3.31 X10*6/UL (ref 4–5.2)
SODIUM SERPL-SCNC: 142 MMOL/L (ref 136–145)
T AXIS: -42 DEGREES
T OFFSET: 395 MS
VENTRICULAR RATE: 95 BPM
WBC # BLD AUTO: 12 X10*3/UL (ref 4.4–11.3)

## 2024-12-05 PROCEDURE — 94762 N-INVAS EAR/PLS OXIMTRY CONT: CPT

## 2024-12-05 PROCEDURE — 36415 COLL VENOUS BLD VENIPUNCTURE: CPT | Performed by: INTERNAL MEDICINE

## 2024-12-05 PROCEDURE — 92526 ORAL FUNCTION THERAPY: CPT | Mod: GN

## 2024-12-05 PROCEDURE — 2500000001 HC RX 250 WO HCPCS SELF ADMINISTERED DRUGS (ALT 637 FOR MEDICARE OP): Performed by: INTERNAL MEDICINE

## 2024-12-05 PROCEDURE — 85027 COMPLETE CBC AUTOMATED: CPT | Performed by: INTERNAL MEDICINE

## 2024-12-05 PROCEDURE — 9420000001 HC RT PATIENT EDUCATION 5 MIN

## 2024-12-05 PROCEDURE — 94640 AIRWAY INHALATION TREATMENT: CPT

## 2024-12-05 PROCEDURE — 2060000001 HC INTERMEDIATE ICU ROOM DAILY

## 2024-12-05 PROCEDURE — 2500000005 HC RX 250 GENERAL PHARMACY W/O HCPCS: Performed by: INTERNAL MEDICINE

## 2024-12-05 PROCEDURE — 80069 RENAL FUNCTION PANEL: CPT | Performed by: INTERNAL MEDICINE

## 2024-12-05 PROCEDURE — 2500000002 HC RX 250 W HCPCS SELF ADMINISTERED DRUGS (ALT 637 FOR MEDICARE OP, ALT 636 FOR OP/ED): Performed by: INTERNAL MEDICINE

## 2024-12-05 PROCEDURE — 2500000004 HC RX 250 GENERAL PHARMACY W/ HCPCS (ALT 636 FOR OP/ED): Performed by: INTERNAL MEDICINE

## 2024-12-05 RX ORDER — DOXYCYCLINE 100 MG/1
100 CAPSULE ORAL EVERY 12 HOURS SCHEDULED
Status: DISCONTINUED | OUTPATIENT
Start: 2024-12-05 | End: 2024-12-06 | Stop reason: HOSPADM

## 2024-12-05 RX ORDER — AMOXICILLIN AND CLAVULANATE POTASSIUM 875; 125 MG/1; MG/1
1 TABLET, FILM COATED ORAL EVERY 12 HOURS SCHEDULED
Status: DISCONTINUED | OUTPATIENT
Start: 2024-12-05 | End: 2024-12-06 | Stop reason: HOSPADM

## 2024-12-05 ASSESSMENT — PAIN DESCRIPTION - ORIENTATION: ORIENTATION: RIGHT;LEFT

## 2024-12-05 ASSESSMENT — PAIN SCALES - GENERAL
PAINLEVEL_OUTOF10: 0 - NO PAIN
PAINLEVEL_OUTOF10: 0 - NO PAIN
PAINLEVEL_OUTOF10: 7
PAINLEVEL_OUTOF10: 0 - NO PAIN

## 2024-12-05 ASSESSMENT — COGNITIVE AND FUNCTIONAL STATUS - GENERAL
MOVING TO AND FROM BED TO CHAIR: TOTAL
EATING MEALS: TOTAL
EATING MEALS: TOTAL
STANDING UP FROM CHAIR USING ARMS: TOTAL
WALKING IN HOSPITAL ROOM: TOTAL
DRESSING REGULAR UPPER BODY CLOTHING: TOTAL
STANDING UP FROM CHAIR USING ARMS: TOTAL
MOVING FROM LYING ON BACK TO SITTING ON SIDE OF FLAT BED WITH BEDRAILS: TOTAL
TOILETING: TOTAL
MOBILITY SCORE: 6
PERSONAL GROOMING: TOTAL
MOBILITY SCORE: 6
TURNING FROM BACK TO SIDE WHILE IN FLAT BAD: TOTAL
DRESSING REGULAR LOWER BODY CLOTHING: TOTAL
HELP NEEDED FOR BATHING: TOTAL
TOILETING: TOTAL
HELP NEEDED FOR BATHING: TOTAL
DRESSING REGULAR UPPER BODY CLOTHING: TOTAL
DAILY ACTIVITIY SCORE: 6
WALKING IN HOSPITAL ROOM: TOTAL
TURNING FROM BACK TO SIDE WHILE IN FLAT BAD: TOTAL
DAILY ACTIVITIY SCORE: 6
CLIMB 3 TO 5 STEPS WITH RAILING: TOTAL
PERSONAL GROOMING: TOTAL
MOVING TO AND FROM BED TO CHAIR: TOTAL
CLIMB 3 TO 5 STEPS WITH RAILING: TOTAL
MOVING FROM LYING ON BACK TO SITTING ON SIDE OF FLAT BED WITH BEDRAILS: TOTAL
DRESSING REGULAR LOWER BODY CLOTHING: TOTAL

## 2024-12-05 ASSESSMENT — PAIN - FUNCTIONAL ASSESSMENT: PAIN_FUNCTIONAL_ASSESSMENT: 0-10

## 2024-12-05 ASSESSMENT — PAIN SCALES - WONG BAKER: WONGBAKER_NUMERICALRESPONSE: HURTS LITTLE MORE

## 2024-12-05 NOTE — PROGRESS NOTES
Amrita Lopez is a 84 y.o. female on day 5 of admission presenting with Shortness of breath.    Subjective     Patient was seen and examined.  Comfortably lying in the bed.  Feeling better as compared to before.  Patient was able to feed herself, but still complaining of odynophagia.       Objective     Physical Exam  HEENT:  Head externally atraumatic,  extraocular movements intact, oral mucosa moist , oral ulcers and thrush present  Neck:  Supple, no JVP, no palpable adenopathy or thyromegaly.  No carotid bruit.  Chest:  Clear to auscultation and resonant.  Heart:  Regular rate and rhythm, no murmur or gallop could be appreciated.  Abdomen:  Soft,  obese, nontender, bowel sounds present, normoactive, no palpable hepatosplenomegaly.  Extremities:  No edema, pulses present, no cyanosis or clubbing.  CNS:  Patient alert, oriented to time, place and person.    No new deficit.  Cranial nerves 2-12 grossly intact  Skin:  multiple decubitus ulcer on the buttocks and thighs.  Musculoskeletal:  No  apparent joint swelling or erythema, range of movement normal.  Last Recorded Vitals  Heart Rate:  [73-88]   Temp:  [36.1 °C (97 °F)-36.4 °C (97.5 °F)]   Resp:  [12-24]   BP: (109-162)/(53-66)   Weight:  [120 kg (263 lb 12.8 oz)]   SpO2:  [92 %-100 %]     Intake/Output last 3 Shifts:  I/O last 3 completed shifts:  In: 100 (0.8 mL/kg) [P.O.:100]  Out: 1500 (12.5 mL/kg) [Urine:1500 (0.3 mL/kg/hr)]  Weight: 119.7 kg     Relevant Results  Susceptibility data from last 90 days.  Collected Specimen Info Organism   12/02/24 Tissue/Biopsy from Mouth Candida albicans     Mixed Gram-Positive Bacteria      Results for orders placed or performed during the hospital encounter of 11/30/24 (from the past 24 hours)   Renal Function Panel   Result Value Ref Range    Glucose 98 74 - 99 mg/dL    Sodium 142 136 - 145 mmol/L    Potassium 3.8 3.5 - 5.3 mmol/L    Chloride 106 98 - 107 mmol/L    Bicarbonate 28 21 - 32 mmol/L    Anion Gap 12 10 - 20  mmol/L    Urea Nitrogen 40 (H) 6 - 23 mg/dL    Creatinine 1.11 (H) 0.50 - 1.05 mg/dL    eGFR 50 (L) >60 mL/min/1.73m*2    Calcium 8.1 (L) 8.6 - 10.3 mg/dL    Phosphorus 2.9 2.5 - 4.9 mg/dL    Albumin 2.2 (L) 3.4 - 5.0 g/dL   CBC   Result Value Ref Range    WBC 12.0 (H) 4.4 - 11.3 x10*3/uL    nRBC 0.0 0.0 - 0.0 /100 WBCs    RBC 3.31 (L) 4.00 - 5.20 x10*6/uL    Hemoglobin 9.1 (L) 12.0 - 16.0 g/dL    Hematocrit 30.9 (L) 36.0 - 46.0 %    MCV 93 80 - 100 fL    MCH 27.5 26.0 - 34.0 pg    MCHC 29.4 (L) 32.0 - 36.0 g/dL    RDW 17.7 (H) 11.5 - 14.5 %    Platelets 432 150 - 450 x10*3/uL        Current Facility-Administered Medications:     acetaminophen (Tylenol) tablet 650 mg, 650 mg, oral, q4h PRN, 650 mg at 12/04/24 2102 **OR** acetaminophen (Tylenol) oral liquid 650 mg, 650 mg, oral, q4h PRN, 650 mg at 12/03/24 1447 **OR** acetaminophen (Tylenol) suppository 650 mg, 650 mg, rectal, q4h PRN, Arnoldo Sanchez MD    albuterol 2.5 mg /3 mL (0.083 %) nebulizer solution 2.5 mg, 2.5 mg, nebulization, q2h PRN, Arnoldo Sanchez MD    ammonium lactate (Lac-Hydrin) 12 % lotion 1 Application, 1 Application, Topical, BID, Arnoldo Sanchez MD, 1 Application at 12/04/24 2104    aspirin chewable tablet 81 mg, 81 mg, oral, Daily, Arnoldo Sanchez MD, 81 mg at 12/04/24 0932    atorvastatin (Lipitor) tablet 10 mg, 10 mg, oral, Nightly, Arnoldo Sanchez MD, 10 mg at 12/04/24 2102    benzocaine-menthol (Cepastat Sore Throat) lozenge 1 lozenge, 1 lozenge, Mouth/Throat, q2h PRN, Arnoldo Sanchez MD    ceftaroline (Teflaro) 300 mg in dextrose 5% 50 mL IV, 300 mg, intravenous, q12h, Jose Alberto Spence MD, Last Rate: 60 mL/hr at 12/05/24 0631, 300 mg at 12/05/24 0631    cetirizine (ZyrTEC) tablet 10 mg, 10 mg, oral, Daily, Arnoldo Sanchez MD, 10 mg at 12/04/24 0933    chlorhexidine (Peridex) 0.12 % solution 15 mL, 15 mL, Mouth/Throat, BID, Jose Alberto Spence MD, 15 mL at 12/04/24 2104    cholecalciferol (Vitamin D-3)  tablet 5,000 Units, 5,000 Units, oral, Daily, Arnoldo Sanchez MD, 5,000 Units at 12/04/24 0932    clopidogrel (Plavix) tablet 75 mg, 75 mg, oral, Daily, Arnoldo Sanchez MD, 75 mg at 12/04/24 0933    clotrimazole (Mycelex) adeline 10 mg, 10 mg, oral, 5x daily, Jose Alberto Spence MD, 10 mg at 12/04/24 1854    cyanocobalamin (Vitamin B-12) tablet 500 mcg, 500 mcg, oral, Daily, Arnoldo Sanchez MD, 500 mcg at 12/04/24 0933    dextromethorphan-guaifenesin (Robitussin DM)  mg/5 mL oral liquid 5 mL, 5 mL, oral, q4h PRN, Arnoldo Sanchez MD    guaiFENesin (Mucinex) 12 hr tablet 600 mg, 600 mg, oral, q12h PRN, Arnoldo Sacnhez MD    ipratropium-albuteroL (Duo-Neb) 0.5-2.5 mg/3 mL nebulizer solution 3 mL, 3 mL, nebulization, TID, Arnoldo Sanchez MD, 3 mL at 12/05/24 0721    loperamide (Imodium) capsule 2 mg, 2 mg, oral, 4x daily PRN, Arnoldo Sanchez MD    menthol-zinc oxide (Calmoseptine - Risamine) 0.44-20.6 % ointment, , Topical, 4x daily PRN, Arnoldo Sanchez MD    mupirocin (Bactroban) 2 % ointment, , Topical, BID, Jose Alberto Spence MD, Given at 12/04/24 2104    nystatin (Mycostatin) 100,000 unit/gram powder 1 Application, 1 Application, Topical, BID, Arnoldo Sanchez MD, 1 Application at 12/04/24 2104    ondansetron ODT (Zofran-ODT) disintegrating tablet 4 mg, 4 mg, oral, q8h PRN, Arnoldo Sanchez MD    oxygen (O2) therapy, , inhalation, q8h, Arnoldo Sanchez MD, 21 percent at 12/05/24 0723    pantoprazole (ProtoNix) EC tablet 40 mg, 40 mg, oral, Daily before breakfast, Arnoldo Sancehz MD, 40 mg at 12/04/24 1008    piperacillin-tazobactam (Zosyn) 2.25 g in dextrose (iso) IV 50 mL, 2.25 g, intravenous, q6h, Jose Alberto Spence MD, Stopped at 12/05/24 0554    polyethylene glycol (Glycolax, Miralax) packet 17 g, 17 g, oral, Daily PRN, Arnoldo Sanchez MD    sennosides (Senokot) tablet 17.2 mg, 2 tablet, oral, BID, Arnoldo Sanchez MD, 17.2 mg at 12/04/24 1007     [Held by provider] topiramate (Topamax) tablet 50 mg, 50 mg, oral, Daily, Arnoldo Sanchez MD   Assessment/Plan   Principal Problem:    Shortness of breath   Sepsis  Toxic and metabolic encephalopathy  Urinary tract infection   Acute renal failure   Obesity   Decubitus ulcer   Hyponatremia   Bronchiectasis   Acute hypoxic respiratory failure   MRSA colonization of the nares  Leukocytosis    Plan Continue current medication continue IV antibiotics.  Rectal tube in place.  Local wound care for decubitus ulcers.  Hyponatremia is improving.  Breathing is better.  Antibiotics per ID.  Renal functions are almost back to baseline.  Monitor for now.  We will take deep vein thrombosis, fall, aspiration, decubitus, and deep vein thrombosis precautions           Arnoldo Sanchez MD

## 2024-12-05 NOTE — DOCUMENTATION CLARIFICATION NOTE
PATIENT:               MANUEL FELIX  ACCT #:                  [id number]  MRN:                       [id number]  :                       1940  ADMIT DATE:       2024 6:52 AM  DISCH DATE:  RESPONDING PROVIDER #:        [id number]          PROVIDER RESPONSE TEXT:    Sepsis 2/2 MRSA with multi-system organ dysfunctions of ROM with ATN, Metabolic Acidosis, Acute on chronic hypoxic respiratory failure & Toxic Metabolic Encephalopathy    CDI QUERY TEXT:    Clarification    Instruction:    Based on your assessment of the patient and the clinical information, please provide the requested documentation by clicking on the appropriate radio button and enter any additional information if prompted.    Question: Please further clarify if a relationship exists between the Sepsis and acute organ dysfunction    When answering this query, please exercise your independent professional judgment. The fact that a question is being asked, does not imply that any particular answer is desired or expected.    The patient's clinical indicators include:  Clinical Information: 84y.o. F presents via EMS from nursing [place] for SOB.  Admitted w/Acute on chronic resp. failure, ROM, Leukocytosis, Acute Diverticulitis, Bronchiectasis and possible Aspiration PNA.  -VS: 36.2, 96, 25, 155/94, 96% on 4L NC     H&P: Acute respiratory failure. Acute renal failure. Hyperuricemia. Leukocytosis. Decubitus ulcer stage II on the buttocks. Chronic pancreatitis. Metabolic acidosis. Acute diverticulitis. Patient was has a 3, respiratory failure and he was vomiting.  The patient was Aspiration pneumonia.     Nephrology Consult: Acute kidney injury likely secondary to volume depletion. Metabolic acidosis in setting of acute kidney injury and Topamax. Possible UTI     ID Consult: Encephalopathy-toxic metabolic versus infectious. Sigmoid diverticulitis. Bronchiectasis. Acute hypoxic respiratory failure. MRSA colonization of nares      Progress Note: Acute kidney injury likely with acute tubular necrosis in the setting of volume depletion and sepsis. Metabolic acidosis in setting of acute kidney injury and Topamax. UTI. Sepsis    12/2 Progress Note: Sepsis. Toxic and metabolic encephalopathy. Acute renal failure. Metabolic acidosis resolved. Hypokalemia. Oral thrush    Clinical Indicators:  11/30 Anterior Nares swab: MRSA detected  11/30 RBC 4.46, WBC 29.1, Neutrophils 26.77, Bands 18.33  12/1   RBC 3.89, WBC 27.7  12/2  RBC 3.50, WBC 17.2  12/3  RBC 3.39, WBC 13.0, Neutrophils 9.88    11/30 ABG: pH 7.31, pCO2 29, pO2 86, Lactate 2.3, HCO3 14.6    11/30 Cr 1.98, GFR 25, BUN 94, K 3.7  12/1   Cr 2.56, GFR 18, , K 3.7  12/2  Cr 2.00, GFR 25, , K 2.9  12/3  Cr 1.28, GFR 42, BUN 71, K 3.4    Treatment:  11/30-12/3 Zosyn 2.25-4.5gm IV x5 doses  11/30 Azithromycin 500mg IV x1 dose  11/30 Vanco 2gm IV x1 dose  11/30-12/2 Meropenem 1gm IV x5 doses  12/1-12/3 Ceftaroline 300mg IV 4 doses    Risk Factors: PMHx: COPD, Chronic Pancreatitis, bilateral Lymphedema, morbid obesity.  BMI: 45.3  Options provided:  -- Sepsis 2/2 MRSA with multi-system organ dysfunctions of ROM with ATN, Metabolic Acidosis, Acute on chronic hypoxic respiratory failure & Toxic Metabolic Encephalopathy  -- Sepsis 2/2 MRSA with respiratory organ dysfunction of Acute on chronic hypoxic respiratory failure  -- Sepsis 2/2 MRSA with neurological organ dysfunction of Toxic Metabolic Encephalopathy  -- Sepsis 2/2 MRSA with renal organ dysfunction of ROM with ATN  -- Other - I will add my own diagnosis  -- Refer to Clinical Documentation Reviewer    Query created by: Evie Rogel on 12/3/2024 3:12 PM      Electronically signed by:  VENUS BESS MD 12/5/2024 10:52 AM

## 2024-12-05 NOTE — CARE PLAN
Problem: Skin  Goal: Decreased wound size/increased tissue granulation at next dressing change  Outcome: Progressing  Goal: Participates in plan/prevention/treatment measures  Outcome: Progressing  Goal: Prevent/manage excess moisture  Outcome: Progressing  Goal: Prevent/minimize sheer/friction injuries  Outcome: Progressing  Goal: Promote/optimize nutrition  Outcome: Progressing  Goal: Promote skin healing  Outcome: Progressing   The patient's goals for the shift include  safety    The clinical goals for the shift include comfort    Over the shift, the patient did not make progress toward the following goals. Barriers to progression include none. Recommendations to address these barriers include none.

## 2024-12-05 NOTE — PROGRESS NOTES
Spiritual Care Visit    Clinical Encounter Type  Visited With: Patient  Routine Visit: Introduction     Annotation:  provided patient support while rounding the Unit.  introduced  services of    explained the role of the  in providing emotional and spiritual support for patient's and family while in admitted to the hospital. Patient sitting upright in the bed, Patient was awake smiling.  attempted to engage the patient in conversation, patient appears pleasant but was non verbal. Patient did nod and respond minimally.  No spiritual or Synagogue needs were expressed. Spiritual care will remain available for support as requested.                                         Taxonomy  Intended Effects: Establish rapport and connectedness, Build relationship of care and support, Demonstrate caring and concern  Methods: Offer support  Interventions: Explain  role

## 2024-12-05 NOTE — CARE PLAN
Aerosol therapy provided per order     Problem: Respiratory  Goal: No signs of respiratory distress (eg. Use of accessory muscles. Peds grunting)  Outcome: Progressing

## 2024-12-05 NOTE — PROGRESS NOTES
ROM resolved, Will sign off at this time, please call back with any questions, thank you. Patient can follow-up with us with a renal function panel in 1 week and office visit in 2 weeks.     Meaghan Choe MD

## 2024-12-05 NOTE — PROGRESS NOTES
Speech-Language Pathology Clinical Swallow Treatment    Patient Name: Amrita Lopez  MRN: [id number]  : 1940  Today's Date: 24  Start Time: 945  Stop Time: 1000  Time Calculation (min): 15 min    ASSESSMENT  SLP TX Intervention Outcome: Making Progress Towards Goals  Treatment Tolerance: Patient tolerated treatment well    Impressions:   Patient is tolerating soft diet/ thin liquids without overt s/s of aspiration. Pt appears to be at her baseline function. Nursing staff reports no concerns with swallow function.     Discharge dysphagia therapy.      PLAN  Is MBSS recommended? No; pt appears to be at or near functional baseline.  Recommended solid consistency: Soft/bite-sized (IDDSI level 6)  Recommended liquid consistency: Thin (IDDSI 0)  Recommended medication administration: Crushed, in puree  Safe swallow strategies:  - Upright positioning for all PO intake  - Supervision recommended during meals    Discharge recommendation: no further SLP  Inpatient/Swing Bed or Outpatient: Inpatient  SLP TX Plan: Discharge from Speech Therapy    Discussed POC: Patient, Nursing  Patient/Caregiver Agreeable: Yes    SUBJECTIVE  Prior to Session Communication: Bedside nurse  RN cleared pt to participate in session and reported no swallowing issues  Respiratory status: Supplemental oxygen via NC  Positioning: Upright in bed  Pt was alert and cooperative for session, nods head to answer questions    Pain Assessment  Pain Assessment: 0-10  0-10 (Numeric) Pain Score: 0 - No pain  Campbell-Baker FACES Pain Rating: Hurts little bit  Pain Type: Chronic pain  Pain Location: Buttocks  Pain Orientation: Right, Left  Pain Descriptors: Aching  Pain Interventions: Repositioned, Medication (See MAR)  Response to Interventions: Decrease in pain     Orientation: Unable to answer orientation questions  Ability to follow functional commands: Follows simple commands inconsistently    OBJECTIVE  Therapeutic Swallow  Solid Diet  Recommendations: Soft & bite sized/chopped (IDDSI Level 6)  Liquid Diet Recommendations: Thin (IDDSI Level 0)    Treatment/Education:  Results and recommendations were relayed to: Patient and Bedside nurse  Education provided: Yes   Learner: Patient   Barriers to learning: Cognitive limitations barrier   Method of teaching: Verbal   Topic: role of ST   Outcome of teaching: Unable to demonstrate understanding at this time      Goals:   Dysphagia Goals: (Goals Initiated 12/3/24 Target Date: 2 weeks)  LTG: Pt will tolerate recommended diet without overt s/s of aspiration (I.e coughing or choking) in order to meet nutrition and hydration standards.      STG 1: Pt will tolerate current diet recommendation without overt s/s of aspiration (I.e. coughing/choking) with 90% accuracy during observed PO trials.   Goals Initiated 12/3/24 Target Date: 2 weeks  Status: Pt participated in PO intake of her meal tray with eggs,and water in which she tolerated without overt s/s of aspiration (I.e coughing and choking) with 100% accuracy and 1:1 feeding assistance.              Progress this Visit: Goal Met   STG 2: Pt will participate in PO trials with upgraded diet textures without overt s/s of aspiration with 80% accuracy.  Goals Initiated 12/3/24 Target Date: 2 weeks  Status: Trialed 1/2 araseli cracker, slow rate of mastication and increased oral residue. Continue with soft/bite size as patients baseline diet.              Progress this Visit: N/A  STG 3: Pt will participate in oral care to increase oral moisture, hygiene and to increase adequacy of swallow function.   Goals Initiated 12/3/24 Target Date: 2 weeks  Status: adequate oral hygiene today.

## 2024-12-05 NOTE — PROGRESS NOTES
Amrita Lopez is a 84 y.o. female on day 5 of admission presenting with Shortness of breath.    Subjective   Interval History:   Afebrile  Awake, nonverbal  Nurse present  Remains on room air        Review of Systems   Unable to perform ROS: Patient nonverbal       Objective   Range of Vitals (last 24 hours)  Heart Rate:  [73-88]   Temp:  [36.1 °C (97 °F)-36.4 °C (97.5 °F)]   Resp:  [12-24]   BP: (109-162)/(53-66)   Weight:  [120 kg (263 lb 12.8 oz)]   SpO2:  [92 %-100 %]   Daily Weight  12/05/24 : 120 kg (263 lb 12.8 oz)    Body mass index is 45.28 kg/m².    Physical Exam  HENT:      Head: Normocephalic and atraumatic.      Nose: Nose normal.   Eyes:      General: No scleral icterus.     Conjunctiva/sclera: Conjunctivae normal.   Cardiovascular:      Rate and Rhythm: Normal rate and regular rhythm.   Pulmonary:      Breath sounds: Decreased breath sounds present.   Abdominal:      General: Bowel sounds are normal.      Palpations: Abdomen is soft.   Musculoskeletal:      Cervical back: Normal range of motion and neck supple.      Right lower leg: No edema.      Left lower leg: No edema.   Skin:     General: Skin is warm and dry.   Neurological:      Mental Status: She is awake, nonverbal  Psychiatric:      Comments: Awake, nonverbal    Antibiotics  ceftaroline (Teflaro) IV in 50 mL D5W  mupirocin - 2 %  piperacillin-tazobactam - 2.25 gram/50 mL    Relevant Results  Labs  Results from last 72 hours   Lab Units 12/05/24 0448 12/04/24 0447 12/03/24  0721   WBC AUTO x10*3/uL 12.0* 13.4* 13.0*   HEMOGLOBIN g/dL 9.1* 9.1* 9.3*   HEMATOCRIT % 30.9* 30.8* 29.9*   PLATELETS AUTO x10*3/uL 432 414 424   NEUTROS PCT AUTO %  --   --  76.2   LYMPHS PCT AUTO %  --   --  12.9   MONOS PCT AUTO %  --   --  5.4   EOS PCT AUTO %  --   --  0.3     Results from last 72 hours   Lab Units 12/05/24 0448 12/04/24 0447 12/03/24  0721 12/02/24  1713   SODIUM mmol/L 142 142 148* 147*   POTASSIUM mmol/L 3.8 3.8 3.4* 3.9   CHLORIDE mmol/L  "106 107 111* 107   CO2 mmol/L 28 27 30 30   BUN mg/dL 40* 57* 71* 88*   CREATININE mg/dL 1.11* 1.22* 1.28* 1.54*   GLUCOSE mg/dL 98 129* 114* 118*   CALCIUM mg/dL 8.1* 8.0* 8.0* 8.2*   ANION GAP mmol/L 12 12 10 14   EGFR mL/min/1.73m*2 50* 44* 42* 34*   PHOSPHORUS mg/dL 2.9 2.4*  --  3.3     Results from last 72 hours   Lab Units 12/05/24  0448 12/04/24  0447 12/02/24  1713   ALBUMIN g/dL 2.2* 2.2* 2.1*     Estimated Creatinine Clearance: 49.8 mL/min (A) (by C-G formula based on SCr of 1.11 mg/dL (H)).  No results found for: \"CRP\"  Microbiology  Susceptibility data from last 14 days.  Collected Specimen Info Organism   12/02/24 Tissue/Biopsy from Mouth Candida albicans     Mixed Gram-Positive Bacteria        Imaging  ECG 12 Lead    Result Date: 12/5/2024  Normal sinus rhythm Possible Inferior infarct , age undetermined Nonspecific ST and T wave abnormality Abnormal ECG No previous ECGs available Confirmed by Nupur Modi ([id number]) on 12/5/2024 7:39:16 AM    CT abdomen pelvis wo IV contrast    Result Date: 11/30/2024  Interpreted By:  Aris Malone, STUDY: CT ABDOMEN PELVIS WO IV CONTRAST;  11/30/2024 10:09 am   INDICATION: Signs/Symptoms:buttock pain, wound, eval for abscess vs other. Acute renal injury/no contrast..   COMPARISON: CT abdomen and pelvis dated 12/08/2022.   ACCESSION NUMBER(S): [id number]   ORDERING CLINICIAN: JORGE ASHBY   TECHNIQUE: CT of the abdomen and pelvis was performed. Contiguous axial images were obtained at 3 mm slice thickness through the abdomen and pelvis. Coronal and sagittal reconstructions at 3 mm slice thickness were performed.  No intravenous contrast was administered.   Assessment of the abdominopelvic viscera is also limited by beam hardening artifact related to patient body habitus and positioning of the extremities.   FINDINGS: Please note that the evaluation of vessels, lymph nodes and organs is limited without intravenous contrast.   LOWER CHEST: Mild bibasilar " scarring/atelectasis. Peripheral bronchiectasis and mucous plugging within the visualized lower lobes. Calcification of the mitral valve annulus. Aortic valve calcifications also suggested. Mild coronary artery calcifications within the visualized regions.   ABDOMEN:   LIVER: The liver demonstrates homogeneous attenuation without evidence of focal liver lesions.   BILE DUCTS: The intrahepatic and extrahepatic ducts are not dilated.   GALLBLADDER: No calcified stones. No wall thickening.   PANCREAS: Mild fatty infiltration of the pancreatic parenchyma. Otherwise within normal limits.   SPLEEN: Within normal limits.   ADRENAL GLANDS: Bilateral adrenal glands appear normal.   KIDNEYS AND URETERS: The kidneys are normal in size and unremarkable in appearance. Hydronephrosis or hydroureter. Questionable punctate nonobstructing right renal calculus (series 4, image 52).   PELVIS:   BLADDER: Ariza catheter within a collapsed bladder, limiting evaluation.   REPRODUCTIVE ORGANS: Uterus and ovaries are unremarkable in CT appearance.   BOWEL: Small hiatal hernia. The stomach is otherwise unremarkable. Sigmoid colonic diverticulosis. There is mild element of fat stranding within this region (series 4, image 112),  possibly representing a mild element of acute uncomplicated sigmoid diverticulitis. There is mild upstream gaseous distention of the colon with air-fluid levels that could reflect an element of ileus or colitis. Similarly there are a few nonspecific air-fluid levels within the nondilated small bowel loops. Fluid-filled, otherwise normal appearing appendix within the right lower quadrant (series 4, image 97, for example).   VESSELS: Severe atherosclerotic calcification of the infrarenal abdominal aorta and iliac arteries. Ectasia of the infrarenal abdominal aorta measuring up to 2.9 cm in caliber.   PERITONEUM/RETROPERITONEUM/LYMPH NODES: No ascites or free air, no fluid collection.  No enlarged or suspicious lymph  nodes.   ABDOMINAL WALL: Obesity. Fatty atrophy of the posterior paraspinal musculature. Tiny fat containing umbilical hernia.   BONES: No suspicious osseous lesions are identified. Diffusely decreased bone mineralization. Sclerosis of the bilateral SI joints. Extensive lower lumbar facet arthropathy with degenerative anterolisthesis of L4 on L5 and rotatory levo scoliosis of the thoracolumbar spine.       1. Sigmoid colonic diverticulosis with mild surrounding inflammatory fat stranding concerning for acute uncomplicated sigmoid colonic diverticulitis. There is mild upstream gaseous distention of the colon with air-fluid levels that may reflect an element of ileus or colitis. No evidence to indicate bowel obstruction. 2. Severe atherosclerosis with ectasia of the infrarenal abdominal aorta measuring up to 2.9 cm in caliber. 3. Bronchiectasis and peripheral mucous plugging within the lung bases. 4. Lumbar degenerative change. 5. Additional chronic findings as above.   MACRO: None   Signed by: Aris Malone 11/30/2024 10:42 AM Dictation workstation:   [id number]    XR chest 1 view    Result Date: 11/30/2024  Interpreted By:  Rekha Rojas, STUDY: XR CHEST 1 VIEW;  11/30/2024 7:57 am   INDICATION: Signs/Symptoms:sob.     COMPARISON: 12/08/2022   ACCESSION NUMBER(S): [id number]   ORDERING CLINICIAN: JORGE ASHBY   FINDINGS: Artifact from overlying monitoring leads. Right hilar fullness and perihilar interstitial prominence similar to the previous exam. No focal airspace consolidation or pleural effusion. The cardiac silhouette is within normal limits for size. Distended gas-filled stomach beneath the left diaphragm.       Perihilar interstitial prominence.   MACRO: None.   Signed by: Rekha Rojas 11/30/2024 8:24 AM Dictation workstation:   [id number]        Assessment/Plan   Encephalopathy-toxic metabolic versus infectious, slowly resolving  Abnormal CT abdomen and pelvis-sigmoid  diverticulitis  Bronchiectasis  Acute hypoxic respiratory failure, resolved  MRSA colonization of nares  Resolving leukocytosis           Discontinue Zosyn  Discontinue Teflaro  Doxycycline  Augmentin  Decolonize per protocol  Monitor renal function  Oxygen as needed  Supportive care  Monitor temperature and WBC  Stop date of antibiotic is 12/7/2024      Jose Alberto Spence MD

## 2024-12-06 VITALS
RESPIRATION RATE: 18 BRPM | OXYGEN SATURATION: 96 % | TEMPERATURE: 97.3 F | WEIGHT: 262.5 LBS | SYSTOLIC BLOOD PRESSURE: 96 MMHG | BODY MASS INDEX: 44.82 KG/M2 | DIASTOLIC BLOOD PRESSURE: 65 MMHG | HEART RATE: 82 BPM | HEIGHT: 64 IN

## 2024-12-06 PROBLEM — R06.02 SHORTNESS OF BREATH: Status: RESOLVED | Noted: 2024-11-30 | Resolved: 2024-12-06

## 2024-12-06 LAB
ALBUMIN SERPL BCP-MCNC: 2.3 G/DL (ref 3.4–5)
ALP SERPL-CCNC: 87 U/L (ref 33–136)
ALT SERPL W P-5'-P-CCNC: 9 U/L (ref 7–45)
ANION GAP SERPL CALCULATED.3IONS-SCNC: 11 MMOL/L (ref 10–20)
AST SERPL W P-5'-P-CCNC: 9 U/L (ref 9–39)
BASOPHILS # BLD AUTO: 0.05 X10*3/UL (ref 0–0.1)
BASOPHILS NFR BLD AUTO: 0.4 %
BILIRUB SERPL-MCNC: 0.4 MG/DL (ref 0–1.2)
BUN SERPL-MCNC: 28 MG/DL (ref 6–23)
CALCIUM SERPL-MCNC: 8.5 MG/DL (ref 8.6–10.3)
CHLORIDE SERPL-SCNC: 110 MMOL/L (ref 98–107)
CO2 SERPL-SCNC: 27 MMOL/L (ref 21–32)
CREAT SERPL-MCNC: 0.98 MG/DL (ref 0.5–1.05)
EGFRCR SERPLBLD CKD-EPI 2021: 58 ML/MIN/1.73M*2
EOSINOPHIL # BLD AUTO: 0.27 X10*3/UL (ref 0–0.4)
EOSINOPHIL NFR BLD AUTO: 2.1 %
ERYTHROCYTE [DISTWIDTH] IN BLOOD BY AUTOMATED COUNT: 18 % (ref 11.5–14.5)
GLUCOSE SERPL-MCNC: 120 MG/DL (ref 74–99)
HCT VFR BLD AUTO: 32.8 % (ref 36–46)
HGB BLD-MCNC: 9.8 G/DL (ref 12–16)
IMM GRANULOCYTES # BLD AUTO: 0.64 X10*3/UL (ref 0–0.5)
IMM GRANULOCYTES NFR BLD AUTO: 4.9 % (ref 0–0.9)
LYMPHOCYTES # BLD AUTO: 2.46 X10*3/UL (ref 0.8–3)
LYMPHOCYTES NFR BLD AUTO: 18.9 %
MCH RBC QN AUTO: 27.8 PG (ref 26–34)
MCHC RBC AUTO-ENTMCNC: 29.9 G/DL (ref 32–36)
MCV RBC AUTO: 93 FL (ref 80–100)
MONOCYTES # BLD AUTO: 0.53 X10*3/UL (ref 0.05–0.8)
MONOCYTES NFR BLD AUTO: 4.1 %
NEUTROPHILS # BLD AUTO: 9.09 X10*3/UL (ref 1.6–5.5)
NEUTROPHILS NFR BLD AUTO: 69.6 %
NRBC BLD-RTO: 0 /100 WBCS (ref 0–0)
PLATELET # BLD AUTO: 425 X10*3/UL (ref 150–450)
POTASSIUM SERPL-SCNC: 3.6 MMOL/L (ref 3.5–5.3)
PROT SERPL-MCNC: 5.3 G/DL (ref 6.4–8.2)
RBC # BLD AUTO: 3.53 X10*6/UL (ref 4–5.2)
SODIUM SERPL-SCNC: 144 MMOL/L (ref 136–145)
WBC # BLD AUTO: 13 X10*3/UL (ref 4.4–11.3)

## 2024-12-06 PROCEDURE — 85025 COMPLETE CBC W/AUTO DIFF WBC: CPT | Performed by: INTERNAL MEDICINE

## 2024-12-06 PROCEDURE — 2500000002 HC RX 250 W HCPCS SELF ADMINISTERED DRUGS (ALT 637 FOR MEDICARE OP, ALT 636 FOR OP/ED): Performed by: INTERNAL MEDICINE

## 2024-12-06 PROCEDURE — 80053 COMPREHEN METABOLIC PANEL: CPT | Performed by: INTERNAL MEDICINE

## 2024-12-06 PROCEDURE — 2500000001 HC RX 250 WO HCPCS SELF ADMINISTERED DRUGS (ALT 637 FOR MEDICARE OP): Performed by: INTERNAL MEDICINE

## 2024-12-06 PROCEDURE — 94640 AIRWAY INHALATION TREATMENT: CPT

## 2024-12-06 PROCEDURE — 36415 COLL VENOUS BLD VENIPUNCTURE: CPT | Performed by: INTERNAL MEDICINE

## 2024-12-06 PROCEDURE — 9420000001 HC RT PATIENT EDUCATION 5 MIN

## 2024-12-06 PROCEDURE — 2500000005 HC RX 250 GENERAL PHARMACY W/O HCPCS: Performed by: INTERNAL MEDICINE

## 2024-12-06 RX ORDER — MUPIROCIN 20 MG/G
OINTMENT TOPICAL 2 TIMES DAILY
Start: 2024-12-06 | End: 2024-12-07

## 2024-12-06 RX ORDER — DOXYCYCLINE 100 MG/1
100 CAPSULE ORAL EVERY 12 HOURS SCHEDULED
Start: 2024-12-06

## 2024-12-06 RX ORDER — GUAIFENESIN 600 MG/1
600 TABLET, EXTENDED RELEASE ORAL EVERY 12 HOURS PRN
Start: 2024-12-06

## 2024-12-06 RX ORDER — AMOXICILLIN AND CLAVULANATE POTASSIUM 875; 125 MG/1; MG/1
1 TABLET, FILM COATED ORAL EVERY 12 HOURS SCHEDULED
Start: 2024-12-06

## 2024-12-06 RX ORDER — GUAIFENESIN/DEXTROMETHORPHAN 100-10MG/5
5 SYRUP ORAL EVERY 4 HOURS PRN
Qty: 118 ML | Refills: 0 | Status: SHIPPED | OUTPATIENT
Start: 2024-12-06

## 2024-12-06 RX ORDER — CLOTRIMAZOLE 10 MG/1
10 LOZENGE ORAL
Start: 2024-12-06 | End: 2024-12-20

## 2024-12-06 RX ORDER — ACETAMINOPHEN 325 MG/1
650 TABLET ORAL EVERY 4 HOURS PRN
Qty: 30 TABLET | Refills: 0 | Status: SHIPPED | OUTPATIENT
Start: 2024-12-06

## 2024-12-06 RX ORDER — HYDROPHILIC CREAM
1 PASTE (GRAM) TOPICAL 3 TIMES DAILY
Start: 2024-12-06

## 2024-12-06 RX ORDER — CHLORHEXIDINE GLUCONATE ORAL RINSE 1.2 MG/ML
15 SOLUTION DENTAL 2 TIMES DAILY
Qty: 473 ML | Refills: 0 | Status: SHIPPED | OUTPATIENT
Start: 2024-12-06 | End: 2024-12-20

## 2024-12-06 ASSESSMENT — COGNITIVE AND FUNCTIONAL STATUS - GENERAL
TOILETING: TOTAL
MOVING TO AND FROM BED TO CHAIR: TOTAL
MOVING FROM LYING ON BACK TO SITTING ON SIDE OF FLAT BED WITH BEDRAILS: TOTAL
MOBILITY SCORE: 6
HELP NEEDED FOR BATHING: TOTAL
EATING MEALS: TOTAL
WALKING IN HOSPITAL ROOM: TOTAL
DRESSING REGULAR UPPER BODY CLOTHING: TOTAL
PERSONAL GROOMING: TOTAL
CLIMB 3 TO 5 STEPS WITH RAILING: TOTAL
STANDING UP FROM CHAIR USING ARMS: TOTAL
DRESSING REGULAR LOWER BODY CLOTHING: TOTAL
DAILY ACTIVITIY SCORE: 6
TURNING FROM BACK TO SIDE WHILE IN FLAT BAD: TOTAL

## 2024-12-06 ASSESSMENT — PAIN SCALES - GENERAL: PAINLEVEL_OUTOF10: 0 - NO PAIN

## 2024-12-06 NOTE — PROGRESS NOTES
Amrita Lopez is a 84 y.o. female on day 5 of admission presenting with Shortness of breath.    Subjective   The patient was seen and examined.  Comfortable lying in the bed.  Denied any headache or dizziness.  No nausea or vomiting.  No fever chills or rigor.  No cough or expectoration.  No diarrhea, dysuria, hematuria frequency.  Patient still complaining of pain in the back.  Patient is more alert as compared to before.  Almost back to her baseline.  Multiple decubitus ulcer present on the buttocks     Objective     Physical Exam  HEENT:  Head externally atraumatic,  extraocular movements intact, oral mucosa moist  Neck:  Supple, no JVP, no palpable adenopathy or thyromegaly.  No carotid bruit.  Chest:  Clear to auscultation and resonant.  Heart:  Regular rate and rhythm, no murmur or gallop could be appreciated.  Abdomen:  Soft, nontender, bowel sounds present, normoactive, no palpable hepatosplenomegaly.  Extremities:  No edema, pulses present, no cyanosis or clubbing.  CNS:  Patient alert, oriented to time, place and person.    No new deficit.  Cranial nerves 2-12 grossly intact  Skin: Multiple decubitus ulcers present on buttocks and sacral area.  Musculoskeletal:  No  apparent joint swelling or erythema, range of movement normal.  Last Recorded Vitals  Heart Rate:  [71-99]   Temp:  [36.1 °C (97 °F)-36.9 °C (98.4 °F)]   Resp:  [12-21]   BP: (121-151)/(55-64)   Weight:  [120 kg (263 lb 12.8 oz)]   SpO2:  [92 %-100 %]     Intake/Output last 3 Shifts:  I/O last 3 completed shifts:  In: 160 (1.3 mL/kg) [P.O.:100; IV Piggyback:60]  Out: 1625 (13.6 mL/kg) [Urine:1625 (0.4 mL/kg/hr)]  Weight: 119.7 kg     Relevant Results  Susceptibility data from last 90 days.  Collected Specimen Info Organism   12/02/24 Tissue/Biopsy from Mouth Candida albicans     Mixed Gram-Positive Bacteria      Results for orders placed or performed during the hospital encounter of 11/30/24 (from the past 24 hours)   Renal Function Panel    Result Value Ref Range    Glucose 98 74 - 99 mg/dL    Sodium 142 136 - 145 mmol/L    Potassium 3.8 3.5 - 5.3 mmol/L    Chloride 106 98 - 107 mmol/L    Bicarbonate 28 21 - 32 mmol/L    Anion Gap 12 10 - 20 mmol/L    Urea Nitrogen 40 (H) 6 - 23 mg/dL    Creatinine 1.11 (H) 0.50 - 1.05 mg/dL    eGFR 50 (L) >60 mL/min/1.73m*2    Calcium 8.1 (L) 8.6 - 10.3 mg/dL    Phosphorus 2.9 2.5 - 4.9 mg/dL    Albumin 2.2 (L) 3.4 - 5.0 g/dL   CBC   Result Value Ref Range    WBC 12.0 (H) 4.4 - 11.3 x10*3/uL    nRBC 0.0 0.0 - 0.0 /100 WBCs    RBC 3.31 (L) 4.00 - 5.20 x10*6/uL    Hemoglobin 9.1 (L) 12.0 - 16.0 g/dL    Hematocrit 30.9 (L) 36.0 - 46.0 %    MCV 93 80 - 100 fL    MCH 27.5 26.0 - 34.0 pg    MCHC 29.4 (L) 32.0 - 36.0 g/dL    RDW 17.7 (H) 11.5 - 14.5 %    Platelets 432 150 - 450 x10*3/uL        Current Facility-Administered Medications:     acetaminophen (Tylenol) tablet 650 mg, 650 mg, oral, q4h PRN, 650 mg at 12/04/24 2102 **OR** acetaminophen (Tylenol) oral liquid 650 mg, 650 mg, oral, q4h PRN, 650 mg at 12/05/24 1759 **OR** acetaminophen (Tylenol) suppository 650 mg, 650 mg, rectal, q4h PRN, Arnoldo Sanchez MD    albuterol 2.5 mg /3 mL (0.083 %) nebulizer solution 2.5 mg, 2.5 mg, nebulization, q2h PRN, Arnoldo Sanchez MD    ammonium lactate (Lac-Hydrin) 12 % lotion 1 Application, 1 Application, Topical, BID, Arnoldo Sanchez MD, 1 Application at 12/05/24 2048    amoxicillin-pot clavulanate (Augmentin) 875-125 mg per tablet 1 tablet, 1 tablet, oral, q12h ANNIE, Jose Alberto Spence MD, 1 tablet at 12/05/24 2040    aspirin chewable tablet 81 mg, 81 mg, oral, Daily, Arnoldo Sanchez MD, 81 mg at 12/05/24 0834    atorvastatin (Lipitor) tablet 10 mg, 10 mg, oral, Nightly, Arnoldo Sanchez MD, 10 mg at 12/05/24 2040    benzocaine-menthol (Cepastat Sore Throat) lozenge 1 lozenge, 1 lozenge, Mouth/Throat, q2h PRN, Arnoldo Sanchez MD    cetirizine (ZyrTEC) tablet 10 mg, 10 mg, oral, Daily, Arnoldo Sanchez,  MD, 10 mg at 12/05/24 0835    chlorhexidine (Peridex) 0.12 % solution 15 mL, 15 mL, Mouth/Throat, BID, Jose Alberto Spence MD, 15 mL at 12/05/24 2048    cholecalciferol (Vitamin D-3) tablet 5,000 Units, 5,000 Units, oral, Daily, Arnoldo Sanchez MD, 5,000 Units at 12/05/24 0834    clopidogrel (Plavix) tablet 75 mg, 75 mg, oral, Daily, Arnoldo Sanchez MD, 75 mg at 12/05/24 0835    clotrimazole (Mycelex) adeline 10 mg, 10 mg, oral, 5x daily, Jose Alberto Spence MD, 10 mg at 12/05/24 2311    cyanocobalamin (Vitamin B-12) tablet 500 mcg, 500 mcg, oral, Daily, Arnoldo Sanchez MD, 500 mcg at 12/05/24 0835    dextromethorphan-guaifenesin (Robitussin DM)  mg/5 mL oral liquid 5 mL, 5 mL, oral, q4h PRN, Arnoldo Sanchez MD    doxycycline (Vibramycin) capsule 100 mg, 100 mg, oral, q12h ANNIE, Jose Alberto Spence MD, 100 mg at 12/05/24 2040    guaiFENesin (Mucinex) 12 hr tablet 600 mg, 600 mg, oral, q12h PRN, Arnoldo Sanchez MD    ipratropium-albuteroL (Duo-Neb) 0.5-2.5 mg/3 mL nebulizer solution 3 mL, 3 mL, nebulization, TID, Arnoldo Sanchez MD, 3 mL at 12/05/24 1907    loperamide (Imodium) capsule 2 mg, 2 mg, oral, 4x daily PRN, Arnoldo Sanchez MD    menthol-zinc oxide (Calmoseptine - Risamine) 0.44-20.6 % ointment, , Topical, 4x daily PRN, Arnoldo Sanchez MD    mupirocin (Bactroban) 2 % ointment, , Topical, BID, Jose Alberto Spence MD, Given at 12/05/24 2048    nystatin (Mycostatin) 100,000 unit/gram powder 1 Application, 1 Application, Topical, BID, Arnoldo Sanchez MD, 1 Application at 12/05/24 2040    ondansetron ODT (Zofran-ODT) disintegrating tablet 4 mg, 4 mg, oral, q8h PRN, Arnoldo Sanchez MD    oxygen (O2) therapy, , inhalation, q8h, Arnoldo Sanchez MD, 21 percent at 12/05/24 2251    pantoprazole (ProtoNix) EC tablet 40 mg, 40 mg, oral, Daily before breakfast, Arnoldo Sanchez MD, 40 mg at 12/05/24 0841    polyethylene glycol (Glycolax, Miralax) packet 17 g, 17 g,  oral, Daily PRN, Arnoldo Sanchez MD    sennosides (Senokot) tablet 17.2 mg, 2 tablet, oral, BID, Arnoldo Sanchez MD, 17.2 mg at 12/04/24 1007    [Held by provider] topiramate (Topamax) tablet 50 mg, 50 mg, oral, Daily, Arnoldo Sanchez MD   Assessment/Plan   Principal Problem:    Shortness of breath  Acute kidney injury  Toxic metabolic encephalopathy  Sepsis  Urinary tract infection  Decubitus ulcer  Obesity  Hyponatremia  Bronchiectasis  Acute reactive.  MRSA colonization  Leukocytosis  Severe protein calorie malnutrition  Anemia of chronic disease      Continue pulm medicine.  Almost back to baseline.  Renal function has improved significantly.  Patient IV antibiotics which has been switched over to oral now.  Monitor for now.  Continue physical therapy occupational.  Speech therapy evaluation reviewed.  We will treat DVT, fall, aspiration, decubitus, and DVT precaution.  This has been discussed with the patient and is agreeable to it.         Arnoldo Sanchez MD

## 2024-12-06 NOTE — PROGRESS NOTES
"Nutrition Follow up Note    Nutrition Assessment      No need for HD per nephrology.    Nutrition History:  Food and Nutrient History: continues to eat very little.  Energy Intake: Poor < 50 %    Anthropometrics:  Ht: 162.6 cm (5' 4\"), Wt: 119 kg (262 lb 8 oz), BMI: 45.04  IBW/kg (Dietitian Calculated): 54.5 kg  Percent of IBW: 218 %  Adjusted Body Weight (kg): 70.68 kg    Weight Change:  Daily Weight  12/06/24 : 119 kg (262 lb 8 oz)  12/01/24 : 118 kg (259 lb 4.2 oz)   05/21/24 : 133 kg (293 lb 6.9 oz)  05/17/24 : 138 kg (304 lb)  01/24/24 : 136 kg (300 lb 12.8 oz)  10/24/23 : 132 kg (290 lb)  01/25/23 : 123 kg (270 lb 9.6 oz)  10/04/21 : 116 kg (254 lb 12.8 oz)  07/09/21 : 108 kg (237 lb)     Weight History / % Weight Change: possible 34# (11.6%) wt loss over ~6 months (5/21-12/1)  Significant Weight Loss: Yes  Interpretation of Weight Loss: >10% in 6 months    Nutrition Focused Physical Exam Findings:   Subcutaneous Fat Loss  Orbital Fat Pads: Well nourished (slightly bulging fat pads)  Buccal Fat Pads: Well nourished (full, rounded cheeks)    Muscle Wasting  Pectoralis (Clavicular Region): Well nourished (clavicle not visible)  Deltoid/Trapezius: Well nourished (rounded appearance at arm, shoulder, neck)    Physical Findings (Nutrition Deficiency/Toxicity)  Skin: Positive (wounds to back, buttock, L calf, sternum and breast)    Nutrition Significant Labs:  Lab Results   Component Value Date    WBC 13.0 (H) 12/06/2024    HGB 9.8 (L) 12/06/2024    HCT 32.8 (L) 12/06/2024     12/06/2024    CHOL 176 03/28/2021    TRIG 99 03/28/2021    HDL 64 03/28/2021    ALT 9 12/06/2024    AST 9 12/06/2024     12/06/2024    K 3.6 12/06/2024     (H) 12/06/2024    CREATININE 0.98 12/06/2024    BUN 28 (H) 12/06/2024    CO2 27 12/06/2024    TSH 1.92 03/28/2021    INR 1.0 06/08/2021    HGBA1C 5.8 03/28/2021     Nutrition Specific Medications:  ammonium lactate, 1 Application, Topical, BID  amoxicillin-pot " clavulanate, 1 tablet, oral, q12h ANNIE  aspirin, 81 mg, oral, Daily  atorvastatin, 10 mg, oral, Nightly  cetirizine, 10 mg, oral, Daily  chlorhexidine, 15 mL, Mouth/Throat, BID  cholecalciferol, 5,000 Units, oral, Daily  clopidogrel, 75 mg, oral, Daily  clotrimazole, 10 mg, oral, 5x daily  cyanocobalamin, 500 mcg, oral, Daily  doxycycline, 100 mg, oral, q12h ANNIE  ipratropium-albuteroL, 3 mL, nebulization, TID  nystatin, 1 Application, Topical, BID  oxygen, , inhalation, q8h  pantoprazole, 40 mg, oral, Daily before breakfast  sennosides, 2 tablet, oral, BID  [Held by provider] topiramate, 50 mg, oral, Daily      Dietary Orders (From admission, onward)       Start     Ordered    12/06/24 1425  Oral nutritional supplements  Until discontinued        Question Answer Comment   Deliver with Lunch    Deliver with Dinner    Select supplement: Ensure Plus High Protein        12/06/24 1424    12/06/24 1425  Oral nutritional supplements  Until discontinued        Question Answer Comment   Deliver with Breakfast    Deliver with Dinner    Select supplement: Jamal        12/06/24 1424    12/03/24 0910  Adult diet Regular; Soft and bite sized 6; Thin 0; 1:1 Feeding  Diet effective now        Question Answer Comment   Diet type Regular    Texture Soft and bite sized 6    Fluid consistency Thin 0    Select tray type: 1:1 Feeding        12/03/24 0909    11/30/24 1534  May Participate in Room Service  ( ROOM SERVICE MAY PARTICIPATE)  Once        Question:  .  Answer:  Yes    11/30/24 1533                  Estimated Needs:   Estimated Energy Needs  Total Energy Estimated Needs (kCal): 2120 kCal  Total Estimated Energy Need per Day (kCal/kg): 30 kCal/kg  Method for Estimating Needs: ABW    Estimated Protein Needs  Total Protein Estimated Needs (g): 88 g  Total Protein Estimated Needs (g/kg): 1.25 g/kg  Method for Estimating Needs: ABW    Estimated Fluid Needs  Method for Estimating Needs: 1 ml/kcal or per MD        Nutrition Diagnosis    Nutrition Diagnosis:  Malnutrition Diagnosis  Patient has Malnutrition Diagnosis: Yes  Diagnosis Status: New  Malnutrition Diagnosis: Severe malnutrition related to chronic disease or condition  As Evidenced by: 34# (11.6%) wt loss over ~6 months, po intake </= 75% estimated needs for >/= 1 month    Nutrition Diagnosis  Patient has Nutrition Diagnosis: Yes  Diagnosis Status (1): Ongoing  Nutrition Diagnosis 1: Increased nutrient needs  Related to (1): increased demand for nutrients  As Evidenced by (1): wounds  Additional Nutrition Diagnosis: Diagnosis 2  Diagnosis Status (2): Ongoing  Nutrition Diagnosis 2: Inadequate oral intake  Related to (2): decreased ability to consume sufficient energy  As Evidenced by (2): po intake < 75% estimated needs       Nutrition Interventions/Recommendations   Nutrition Interventions and Recommendations:    Nutrition Prescription:  Individualized Nutrition Prescription Provided for : 2120 kcals and 85g protein to be provided via diet and supplements    Nutrition Interventions:   Food and/or Nutrient Delivery Interventions  Interventions: Meals and snacks, Medical food supplement  Meals and Snacks: Texture-modified diet  Goal: provide texture per SLP recs  Medical Food Supplement: Commercial beverage  Goal: ensure plus high protein BID to provide 350 kcals and 20g protein each, matthew BID to aid in wound healing    Education Documentation  No documentation found.           Nutrition Monitoring and Evaluation   Monitoring/Evaluation:   Food/Nutrient Related History Monitoring  Monitoring and Evaluation Plan: Energy intake  Energy Intake: Estimated energy intake  Criteria: pt to consume >/= 75% estimated needs    Body Composition/Growth/Weight History  Monitoring and Evaluation Plan: Weight  Weight: Measured weight  Criteria: pt will maintain wt at this time    Nutrition Focused Physical Findings  Monitoring and Evaluation Plan: Skin  Skin: Impaired wound healing  Criteria: pt will  show signs of improvement in skin integrity       Time Spent/Follow-up:   Follow Up  Time Spent (min): 30 minutes  Last Date of Nutrition Visit: 12/06/24  Nutrition Follow-Up Needed?: 3-5 days  Follow up Comment: 12/10/24

## 2024-12-06 NOTE — CARE PLAN
Problem: Skin  Goal: Decreased wound size/increased tissue granulation at next dressing change  Outcome: Progressing  Flowsheets (Taken 12/6/2024 1303)  Decreased wound size/increased tissue granulation at next dressing change:   Promote sleep for wound healing   Protective dressings over bony prominences   Utilize specialty bed per algorithm  Goal: Participates in plan/prevention/treatment measures  Outcome: Progressing  Flowsheets (Taken 12/6/2024 1303)  Participates in plan/prevention/treatment measures:   Discuss with provider PT/OT consult   Elevate heels  Goal: Prevent/manage excess moisture  Outcome: Progressing  Flowsheets (Taken 12/6/2024 1303)  Prevent/manage excess moisture:   Cleanse incontinence/protect with barrier cream   Moisturize dry skin   Follow provider orders for dressing changes   Monitor for/manage infection if present  Goal: Prevent/minimize sheer/friction injuries  Outcome: Progressing  Flowsheets (Taken 12/6/2024 1303)  Prevent/minimize sheer/friction injuries:   Complete micro-shifts as needed if patient unable. Adjust patient position to relieve pressure points, not a full turn   Increase activity/out of bed for meals   Use pull sheet   HOB 30 degrees or less   Turn/reposition every 2 hours/use positioning/transfer devices   Utilize specialty bed per algorithm  Goal: Promote/optimize nutrition  Outcome: Progressing  Flowsheets (Taken 12/6/2024 1303)  Promote/optimize nutrition:   Assist with feeding   Consume > 50% meals/supplements   Offer water/supplements/favorite foods   Monitor/record intake including meals  Goal: Promote skin healing  Outcome: Progressing  Flowsheets (Taken 12/6/2024 1303)  Promote skin healing:   Assess skin/pad under line(s)/device(s)   Protective dressings over bony prominences   Turn/reposition every 2 hours/use positioning/transfer devices   Ensure correct size (line/device) and apply per  instructions   Rotate device position/do not position  patient on device     Problem: Fall/Injury  Goal: Not fall by end of shift  Outcome: Progressing  Goal: Be free from injury by end of the shift  Outcome: Progressing  Goal: Verbalize understanding of personal risk factors for fall in the hospital  Outcome: Progressing  Goal: Verbalize understanding of risk factor reduction measures to prevent injury from fall in the home  Outcome: Progressing  Goal: Use assistive devices by end of the shift  Outcome: Progressing  Goal: Pace activities to prevent fatigue by end of the shift  Outcome: Progressing     Problem: Respiratory  Goal: Minimize anxiety/maximize coping throughout shift  Outcome: Progressing  Flowsheets (Taken 12/6/2024 1303)  Minimize anxiety/maximize coping throughout shift: Med administration/monitoring of effect  Goal: Minimal/no exertional discomfort or dyspnea this shift  Outcome: Progressing  Flowsheets (Taken 12/6/2024 1303)  Minimal/no exertional discomfort or dyspnea this shift: Positioning to promote ventilation/comfort  Goal: Wean oxygen to maintain O2 saturation per order/standard this shift  Outcome: Progressing  Flowsheets (Taken 12/6/2024 1303)  Wean oxygen to maintain O2 saturation per order/standard this shift: Encourage activity/mobility     Problem: Pain - Adult  Goal: Verbalizes/displays adequate comfort level or baseline comfort level  Outcome: Progressing  Flowsheets (Taken 12/6/2024 1303)  Verbalizes/displays adequate comfort level or baseline comfort level:   Administer analgesics based on type and severity of pain and evaluate response   Assess pain using appropriate pain scale   Encourage patient to monitor pain and request assistance     Problem: Safety - Adult  Goal: Free from fall injury  Outcome: Progressing  Flowsheets (Taken 12/6/2024 1303)  Free from fall injury: Instruct family/caregiver on patient safety     Problem: Discharge Planning  Goal: Discharge to home or other facility with appropriate resources  Outcome:  Progressing  Flowsheets (Taken 12/6/2024 1303)  Discharge to home or other facility with appropriate resources:   Identify barriers to discharge with patient and caregiver   Arrange for needed discharge resources and transportation as appropriate   Identify discharge learning needs (meds, wound care, etc)     Problem: Chronic Conditions and Co-morbidities  Goal: Patient's chronic conditions and co-morbidity symptoms are monitored and maintained or improved  Outcome: Progressing  Flowsheets (Taken 12/6/2024 1303)  Care Plan - Patient's Chronic Conditions and Co-Morbidity Symptoms are Monitored and Maintained or Improved:   Monitor and assess patient's chronic conditions and comorbid symptoms for stability, deterioration, or improvement   Collaborate with multidisciplinary team to address chronic and comorbid conditions and prevent exacerbation or deterioration     Problem: Nutrition  Goal: Oral intake greater 75%  Outcome: Progressing   The patient's goals for the shift : increase activity     The clinical goals for the shift include keep positioned to comfort    Over the shift, the patient did make progress toward the following goals. Barriers to progression include pain. Recommendations to address these barriers include analgesics.

## 2024-12-06 NOTE — PROGRESS NOTES
Amrita Lopez is a 84 y.o. female on day 6 of admission presenting with Shortness of breath.    Subjective   Interval History:   Afebrile, no reported chills  Awake, nonverbal  Nurse present  Remains on room air      Objective   Range of Vitals (last 24 hours)  Heart Rate:  [74-99]   Temp:  [35.9 °C (96.6 °F)-36.3 °C (97.3 °F)]   Resp:  [16-21]   BP: ()/(47-77)   Weight:  [119 kg (262 lb 8 oz)]   SpO2:  [93 %-100 %]   Daily Weight  12/06/24 : 119 kg (262 lb 8 oz)    Body mass index is 45.06 kg/m².    Physical Exam  HENT:      Head: Normocephalic and atraumatic.      Nose: Nose normal.   Eyes:      General: No scleral icterus.     Conjunctiva/sclera: Conjunctivae normal.   Cardiovascular:      Rate and Rhythm: Normal rate and regular rhythm.   Pulmonary:      Breath sounds: Decreased breath sounds present.   Abdominal:      General: Bowel sounds are normal.      Palpations: Abdomen is soft.   Musculoskeletal:      Cervical back: Normal range of motion and neck supple.      Right lower leg: No edema.      Left lower leg: No edema.   Skin:     General: Skin is warm and dry.   Neurological:      Mental Status: She is awake, nonverbal  Psychiatric:      Comments: calm    Antibiotics  amoxicillin-pot clavulanate - 875-125 mg, 875-125 mg  doxycycline - 100 mg, 100 mg  mupirocin - 2 %    Relevant Results  Labs  Results from last 72 hours   Lab Units 12/06/24  0932 12/05/24 0448 12/04/24  0447   WBC AUTO x10*3/uL 13.0* 12.0* 13.4*   HEMOGLOBIN g/dL 9.8* 9.1* 9.1*   HEMATOCRIT % 32.8* 30.9* 30.8*   PLATELETS AUTO x10*3/uL 425 432 414   NEUTROS PCT AUTO % 69.6  --   --    LYMPHS PCT AUTO % 18.9  --   --    MONOS PCT AUTO % 4.1  --   --    EOS PCT AUTO % 2.1  --   --      Results from last 72 hours   Lab Units 12/06/24  0932 12/05/24  0448 12/04/24  0447   SODIUM mmol/L 144 142 142   POTASSIUM mmol/L 3.6 3.8 3.8   CHLORIDE mmol/L 110* 106 107   CO2 mmol/L 27 28 27   BUN mg/dL 28* 40* 57*   CREATININE mg/dL 0.98 1.11*  "1.22*   GLUCOSE mg/dL 120* 98 129*   CALCIUM mg/dL 8.5* 8.1* 8.0*   ANION GAP mmol/L 11 12 12   EGFR mL/min/1.73m*2 58* 50* 44*   PHOSPHORUS mg/dL  --  2.9 2.4*     Results from last 72 hours   Lab Units 12/06/24  0932 12/05/24  0448 12/04/24  0447   ALK PHOS U/L 87  --   --    BILIRUBIN TOTAL mg/dL 0.4  --   --    PROTEIN TOTAL g/dL 5.3*  --   --    ALT U/L 9  --   --    AST U/L 9  --   --    ALBUMIN g/dL 2.3* 2.2* 2.2*     Estimated Creatinine Clearance: 56.2 mL/min (by C-G formula based on SCr of 0.98 mg/dL).  No results found for: \"CRP\"  Microbiology  Susceptibility data from last 14 days.  Collected Specimen Info Organism   12/02/24 Tissue/Biopsy from Mouth Candida albicans     Mixed Gram-Positive Bacteria        Imaging      CT abdomen pelvis wo IV contrast    Result Date: 11/30/2024  Interpreted By:  Aris Malone, STUDY: CT ABDOMEN PELVIS WO IV CONTRAST;  11/30/2024 10:09 am   INDICATION: Signs/Symptoms:buttock pain, wound, eval for abscess vs other. Acute renal injury/no contrast..   COMPARISON: CT abdomen and pelvis dated 12/08/2022.   ACCESSION NUMBER(S): [id number]   ORDERING CLINICIAN: JORGE ASHBY   TECHNIQUE: CT of the abdomen and pelvis was performed. Contiguous axial images were obtained at 3 mm slice thickness through the abdomen and pelvis. Coronal and sagittal reconstructions at 3 mm slice thickness were performed.  No intravenous contrast was administered.   Assessment of the abdominopelvic viscera is also limited by beam hardening artifact related to patient body habitus and positioning of the extremities.   FINDINGS: Please note that the evaluation of vessels, lymph nodes and organs is limited without intravenous contrast.   LOWER CHEST: Mild bibasilar scarring/atelectasis. Peripheral bronchiectasis and mucous plugging within the visualized lower lobes. Calcification of the mitral valve annulus. Aortic valve calcifications also suggested. Mild coronary artery calcifications within the " visualized regions.   ABDOMEN:   LIVER: The liver demonstrates homogeneous attenuation without evidence of focal liver lesions.   BILE DUCTS: The intrahepatic and extrahepatic ducts are not dilated.   GALLBLADDER: No calcified stones. No wall thickening.   PANCREAS: Mild fatty infiltration of the pancreatic parenchyma. Otherwise within normal limits.   SPLEEN: Within normal limits.   ADRENAL GLANDS: Bilateral adrenal glands appear normal.   KIDNEYS AND URETERS: The kidneys are normal in size and unremarkable in appearance. Hydronephrosis or hydroureter. Questionable punctate nonobstructing right renal calculus (series 4, image 52).   PELVIS:   BLADDER: Ariza catheter within a collapsed bladder, limiting evaluation.   REPRODUCTIVE ORGANS: Uterus and ovaries are unremarkable in CT appearance.   BOWEL: Small hiatal hernia. The stomach is otherwise unremarkable. Sigmoid colonic diverticulosis. There is mild element of fat stranding within this region (series 4, image 112),  possibly representing a mild element of acute uncomplicated sigmoid diverticulitis. There is mild upstream gaseous distention of the colon with air-fluid levels that could reflect an element of ileus or colitis. Similarly there are a few nonspecific air-fluid levels within the nondilated small bowel loops. Fluid-filled, otherwise normal appearing appendix within the right lower quadrant (series 4, image 97, for example).   VESSELS: Severe atherosclerotic calcification of the infrarenal abdominal aorta and iliac arteries. Ectasia of the infrarenal abdominal aorta measuring up to 2.9 cm in caliber.   PERITONEUM/RETROPERITONEUM/LYMPH NODES: No ascites or free air, no fluid collection.  No enlarged or suspicious lymph nodes.   ABDOMINAL WALL: Obesity. Fatty atrophy of the posterior paraspinal musculature. Tiny fat containing umbilical hernia.   BONES: No suspicious osseous lesions are identified. Diffusely decreased bone mineralization. Sclerosis of the  bilateral SI joints. Extensive lower lumbar facet arthropathy with degenerative anterolisthesis of L4 on L5 and rotatory levo scoliosis of the thoracolumbar spine.       1. Sigmoid colonic diverticulosis with mild surrounding inflammatory fat stranding concerning for acute uncomplicated sigmoid colonic diverticulitis. There is mild upstream gaseous distention of the colon with air-fluid levels that may reflect an element of ileus or colitis. No evidence to indicate bowel obstruction. 2. Severe atherosclerosis with ectasia of the infrarenal abdominal aorta measuring up to 2.9 cm in caliber. 3. Bronchiectasis and peripheral mucous plugging within the lung bases. 4. Lumbar degenerative change. 5. Additional chronic findings as above.   MACRO: None   Signed by: Aris Malone 11/30/2024 10:42 AM Dictation workstation:   [id number]    XR chest 1 view    Result Date: 11/30/2024  Interpreted By:  Rekha Rojas, STUDY: XR CHEST 1 VIEW;  11/30/2024 7:57 am   INDICATION: Signs/Symptoms:sob.     COMPARISON: 12/08/2022   ACCESSION NUMBER(S): [id number]   ORDERING CLINICIAN: JORGE ASHBY   FINDINGS: Artifact from overlying monitoring leads. Right hilar fullness and perihilar interstitial prominence similar to the previous exam. No focal airspace consolidation or pleural effusion. The cardiac silhouette is within normal limits for size. Distended gas-filled stomach beneath the left diaphragm.       Perihilar interstitial prominence.   MACRO: None.   Signed by: Rekha Rojas 11/30/2024 8:24 AM Dictation workstation:   [id number]        Assessment/Plan   Encephalopathy-toxic metabolic versus infectious, slowly resolving  Abnormal CT abdomen and pelvis-sigmoid diverticulitis  Bronchiectasis  Acute hypoxic respiratory failure, resolved  MRSA colonization of nares  Resolving leukocytosis             Doxycycline  Augmentin  Decolonize per protocol  Monitor renal function  Oxygen as needed  Supportive care  Monitor temperature and  WBC  Stop date of antibiotic is 12/7/2024    Total time spent caring for the patient today was 20 minutes.  This includes time spent before the visit reviewing the chart, time spent during the visit, and time spent after the visit on documentation.   Patricia Shea, RADHA-CNP

## 2024-12-06 NOTE — PROGRESS NOTES
12/06/24 0839   Discharge Planning   Home or Post Acute Services Post acute facilities (Rehab/SNF/etc)   Type of Post Acute Facility Services Assisted living  ([facility].  No barriers to return.  She can return when medically ready.)   Expected Discharge Disposition Inter   Does the patient need discharge transport arranged? Yes   RoundTrip coordination needed? Yes   Has discharge transport been arranged? No     1410  Transportation arranged for 3:30 pm.    1420  Reached out to patient's son Sacha [phone] to advise of discharge time.  He is in agreement with discharge plan.

## 2024-12-06 NOTE — CARE PLAN
Problem: Skin  Goal: Decreased wound size/increased tissue granulation at next dressing change  Outcome: Progressing  Goal: Participates in plan/prevention/treatment measures  12/5/2024 2216 by Linda Platt RN  Flowsheets (Taken 12/5/2024 2216)  Participates in plan/prevention/treatment measures: Elevate heels  12/5/2024 2216 by Linda Platt RN  Outcome: Progressing  Flowsheets (Taken 12/5/2024 2216)  Participates in plan/prevention/treatment measures: Elevate heels  Goal: Prevent/manage excess moisture  Outcome: Progressing  Goal: Prevent/minimize sheer/friction injuries  Outcome: Progressing  Goal: Promote/optimize nutrition  Outcome: Progressing  Goal: Promote skin healing  Outcome: Progressing     Problem: Fall/Injury  Goal: Not fall by end of shift  Outcome: Progressing  Goal: Be free from injury by end of the shift  Outcome: Progressing  Goal: Verbalize understanding of personal risk factors for fall in the hospital  Outcome: Progressing  Goal: Verbalize understanding of risk factor reduction measures to prevent injury from fall in the home  Outcome: Progressing  Goal: Use assistive devices by end of the shift  Outcome: Progressing  Goal: Pace activities to prevent fatigue by end of the shift  Outcome: Progressing   The patient's goals for the shift include      The clinical goals for the shift include keep pt comfortable

## 2024-12-07 NOTE — DISCHARGE SUMMARY
Discharge Diagnosis  Shortness of breath    Issues Requiring Follow-Up  Urinary tract infection  Toxic metabolic encephalopathy  Acute respiratory failure  Pneumonia  Acute renal failure  Obesity  Decubitus ulcer  GERD      Discharge Meds     Medication List      START taking these medications     amoxicillin-pot clavulanate 875-125 mg tablet; Commonly known as:   Augmentin; Take 1 tablet by mouth every 12 hours.   benzocaine-menthol lozenge; Commonly known as: Cepastat Sore Throat;   Dissolve 1 lozenge in the mouth every 2 hours if needed for sore throat.   chlorhexidine 0.12 % solution; Commonly known as: Peridex; Use 15 mL in   the mouth or throat 2 times a day for 14 days. DO NOT SWALLOW   clotrimazole 10 mg adeline; Commonly known as: Mycelex; Take 1 tablet (10   mg) by mouth 5 times a day for 14 days.   dextromethorphan-guaifenesin  mg/5 mL oral liquid; Commonly known   as: Robitussin DM; Take 5 mL by mouth every 4 hours if needed for cough.   doxycycline 100 mg capsule; Commonly known as: Vibramycin; Take 1   capsule (100 mg) by mouth every 12 hours. Take with at least 8 ounces   (large glass) of water, do not lie down for 30 minutes after   guaiFENesin 600 mg 12 hr tablet; Commonly known as: Mucinex; Take 1   tablet (600 mg) by mouth every 12 hours if needed for congestion. Do not   crush, chew, or split.   mupirocin 2 % ointment; Commonly known as: Bactroban; Apply topically 2   times a day for 1 dose.   oxygen gas therapy; Commonly known as: O2; Inhale 1 each every 8 hours.     CHANGE how you take these medications     * acetaminophen 325 mg tablet; Commonly known as: Tylenol; What changed:   Another medication with the same name was added. Make sure you understand   how and when to take each.   * acetaminophen 325 mg tablet; Commonly known as: Tylenol; Take 2   tablets (650 mg) by mouth every 4 hours if needed for mild pain (1 - 3).;   What changed: You were already taking a medication with the same  "name, and   this prescription was added. Make sure you understand how and when to take   each.   Triad Wound Dressing paste; Generic drug: wound dressing; Apply 1   Application topically 3 times a day. Apply to affected areas (buttocks,   posterior thighs) once daily, do not scrub off prior application when   reapplying. Apply 1/16\" thick.; What changed: how to take this, when to   take this  * This list has 2 medication(s) that are the same as other medications   prescribed for you. Read the directions carefully, and ask your doctor or   other care provider to review them with you.     CONTINUE taking these medications     albuterol 108 (90 Base) MCG/ACT inhaler   ammonium lactate 12 % lotion; Commonly known as: Lac-Hydrin   aspirin 81 mg chewable tablet   atorvastatin 10 mg tablet; Commonly known as: Lipitor; Take 1 tablet (10   mg) by mouth once daily at bedtime.   cholecalciferol 5,000 Units tablet; Commonly known as: Vitamin D-3   clopidogrel 75 mg tablet; Commonly known as: Plavix   cyanocobalamin 1,000 mcg tablet; Commonly known as: Vitamin B-12   inhalational spacing device inhaler; Use as directed with inhalers   ipratropium-albuteroL 0.5-2.5 mg/3 mL nebulizer solution; Commonly known   as: Duo-Neb; Take 3 mL by nebulization every 6 hours.   loratadine 10 mg tablet; Commonly known as: Claritin   magnesium hydroxide 2,400 mg/10 mL suspension suspension; Commonly known   as: Milk of Magnesia   meloxicam 7.5 mg tablet; Commonly known as: Mobic; Take 1 tablet (7.5   mg) by mouth once daily. Administer with food to reduce GI upset.   nystatin 100,000 unit/gram powder; Commonly known as: Mycostatin; Apply   1 Application topically 2 times a day. Apply to groin.   omeprazole OTC 20 mg EC tablet; Commonly known as: PriLOSEC OTC   ondansetron ODT 4 mg disintegrating tablet; Commonly known as:   Zofran-ODT; Take 1 tablet (4 mg) by mouth every 8 hours if needed for   nausea or vomiting.   polyethylene glycol 17 gram " packet; Commonly known as: Glycolax,   Miralax; Take 17 g by mouth once daily as needed (constipation).   sennosides 8.6 mg tablet; Commonly known as: Senokot; Take 2 tablets   (17.2 mg) by mouth 2 times a day.   topiramate 50 mg tablet; Commonly known as: Topamax; Take 1 tablet (50   mg) by mouth once daily.   Zinc-Oxyde Plus 0.44-20 % ointment; Generic drug: menthol-zinc oxide     STOP taking these medications     furosemide 40 mg tablet; Commonly known as: Lasix   loperamide 2 mg tablet; Commonly known as: Imodium A-D       Test Results Pending At Discharge  Pending Labs       No current pending labs.            Hospital Course   The patient was admitted with complaint of change in mental status and found to have acute on chronic respiratory failure, pneumonia, acute renal failure, toxic and metabolic encephalopathy.  The patient was treated with broad-spectrum antibiotic effect.  Patient was given IV fluid.  Renal function improved.  Respiratory status improved.  Patient did well.  The patient is being discharged to extended-care facility in stable condition.    Pertinent Physical Exam At Time of Discharge  Physical Exam    Outpatient Follow-Up  No future appointments.      Arnoldo Sanchez MD

## 2024-12-07 NOTE — PROGRESS NOTES
Amrita Lopez is a 84 y.o. female on day 6 of admission presenting with Shortness of breath.    Subjective   The patient was seen and examined lying in the bed.  Comfortable.  Denies any acute distress.  Patient denies any headache or dizziness.  No nausea or vomiting.       Objective     Physical Exam  HEENT:  Head externally atraumatic,  extraocular movements intact, oral mucosa moist  Neck:  Supple, no JVP, no palpable adenopathy or thyromegaly.  No carotid bruit.  Chest:  Clear to auscultation and resonant.  Heart:  Regular rate and rhythm, no murmur or gallop could be appreciated.  Abdomen:  Soft, obese, nontender, bowel sounds present, normoactive, no palpable hepatosplenomegaly.  Extremities:  No edema, pulses present, no cyanosis or clubbing.  CNS:  Patient alert, oriented to time, place and person.    No new deficit.  Cranial nerves 2-12 grossly intact  Skin: Multiple decubitus ulcers present,   musculoskeletal:  No  apparent joint swelling or erythema, range of movement normal.  Last Recorded Vitals  Heart Rate:  [74-82]   Temp:  [35.9 °C (96.6 °F)-36.3 °C (97.3 °F)]   Resp:  [17-19]   BP: ()/(47-70)   Weight:  [119 kg (262 lb 8 oz)]   SpO2:  [95 %-100 %]     Intake/Output last 3 Shifts:  I/O last 3 completed shifts:  In: 60 (0.5 mL/kg) [IV Piggyback:60]  Out: 950 (8 mL/kg) [Urine:950 (0.2 mL/kg/hr)]  Weight: 119.1 kg     Relevant Results  Susceptibility data from last 90 days.  Collected Specimen Info Organism   12/02/24 Tissue/Biopsy from Mouth Candida albicans     Mixed Gram-Positive Bacteria      Results for orders placed or performed during the hospital encounter of 11/30/24 (from the past 24 hours)   CBC and Auto Differential   Result Value Ref Range    WBC 13.0 (H) 4.4 - 11.3 x10*3/uL    nRBC 0.0 0.0 - 0.0 /100 WBCs    RBC 3.53 (L) 4.00 - 5.20 x10*6/uL    Hemoglobin 9.8 (L) 12.0 - 16.0 g/dL    Hematocrit 32.8 (L) 36.0 - 46.0 %    MCV 93 80 - 100 fL    MCH 27.8 26.0 - 34.0 pg    MCHC 29.9 (L)  32.0 - 36.0 g/dL    RDW 18.0 (H) 11.5 - 14.5 %    Platelets 425 150 - 450 x10*3/uL    Neutrophils % 69.6 40.0 - 80.0 %    Immature Granulocytes %, Automated 4.9 (H) 0.0 - 0.9 %    Lymphocytes % 18.9 13.0 - 44.0 %    Monocytes % 4.1 2.0 - 10.0 %    Eosinophils % 2.1 0.0 - 6.0 %    Basophils % 0.4 0.0 - 2.0 %    Neutrophils Absolute 9.09 (H) 1.60 - 5.50 x10*3/uL    Immature Granulocytes Absolute, Automated 0.64 (H) 0.00 - 0.50 x10*3/uL    Lymphocytes Absolute 2.46 0.80 - 3.00 x10*3/uL    Monocytes Absolute 0.53 0.05 - 0.80 x10*3/uL    Eosinophils Absolute 0.27 0.00 - 0.40 x10*3/uL    Basophils Absolute 0.05 0.00 - 0.10 x10*3/uL   Comprehensive Metabolic Panel   Result Value Ref Range    Glucose 120 (H) 74 - 99 mg/dL    Sodium 144 136 - 145 mmol/L    Potassium 3.6 3.5 - 5.3 mmol/L    Chloride 110 (H) 98 - 107 mmol/L    Bicarbonate 27 21 - 32 mmol/L    Anion Gap 11 10 - 20 mmol/L    Urea Nitrogen 28 (H) 6 - 23 mg/dL    Creatinine 0.98 0.50 - 1.05 mg/dL    eGFR 58 (L) >60 mL/min/1.73m*2    Calcium 8.5 (L) 8.6 - 10.3 mg/dL    Albumin 2.3 (L) 3.4 - 5.0 g/dL    Alkaline Phosphatase 87 33 - 136 U/L    Total Protein 5.3 (L) 6.4 - 8.2 g/dL    AST 9 9 - 39 U/L    Bilirubin, Total 0.4 0.0 - 1.2 mg/dL    ALT 9 7 - 45 U/L      No current facility-administered medications for this encounter.    Current Outpatient Medications:     acetaminophen (Tylenol) 325 mg tablet, Take 2 tablets (650 mg) by mouth every 6 hours if needed for mild pain (1 - 3), headaches or fever (temp greater than 38.0 C)., Disp: , Rfl:     acetaminophen (Tylenol) 325 mg tablet, Take 2 tablets (650 mg) by mouth every 4 hours if needed for mild pain (1 - 3)., Disp: 30 tablet, Rfl: 0    albuterol 108 (90 Base) MCG/ACT inhaler, Inhale 2 puffs every 2 hours if needed for wheezing or shortness of breath., Disp: , Rfl:     ammonium lactate (Lac-Hydrin) 12 % lotion, Apply 1 Application topically 2 times a day., Disp: , Rfl:     amoxicillin-pot clavulanate (Augmentin)  875-125 mg tablet, Take 1 tablet by mouth every 12 hours., Disp: , Rfl:     aspirin 81 mg EC tablet, Chew 1 tablet (81 mg) once daily., Disp: , Rfl:     atorvastatin (Lipitor) 10 mg tablet, Take 1 tablet (10 mg) by mouth once daily at bedtime., Disp: , Rfl:     benzocaine-menthol (Cepastat Sore Throat) lozenge, Dissolve 1 lozenge in the mouth every 2 hours if needed for sore throat., Disp: , Rfl:     chlorhexidine (Peridex) 0.12 % solution, Use 15 mL in the mouth or throat 2 times a day for 14 days. DO NOT SWALLOW, Disp: 473 mL, Rfl: 0    cholecalciferol (Vitamin D-3) 5,000 Units tablet, Take 1 tablet (5,000 Units) by mouth once daily., Disp: , Rfl:     clopidogrel (Plavix) 75 mg tablet, Take 1 tablet (75 mg) by mouth once daily., Disp: , Rfl:     clotrimazole (Mycelex) 10 mg adeline, Take 1 tablet (10 mg) by mouth 5 times a day for 14 days., Disp: , Rfl:     cyanocobalamin (Vitamin B-12) 1,000 mcg tablet, Take 0.5 tablets (500 mcg) by mouth once daily., Disp: , Rfl:     dextromethorphan-guaifenesin (Robitussin DM)  mg/5 mL oral liquid, Take 5 mL by mouth every 4 hours if needed for cough., Disp: 118 mL, Rfl: 0    doxycycline (Vibramycin) 100 mg capsule, Take 1 capsule (100 mg) by mouth every 12 hours. Take with at least 8 ounces (large glass) of water, do not lie down for 30 minutes after, Disp: , Rfl:     guaiFENesin (Mucinex) 600 mg 12 hr tablet, Take 1 tablet (600 mg) by mouth every 12 hours if needed for congestion. Do not crush, chew, or split., Disp: , Rfl:     inhalational spacing device inhaler, Use as directed with inhalers, Disp: 1 each, Rfl: 0    ipratropium-albuteroL (Duo-Neb) 0.5-2.5 mg/3 mL nebulizer solution, Take 3 mL by nebulization every 6 hours., Disp: , Rfl:     loratadine (Claritin) 10 mg tablet, Take 1 tablet (10 mg) by mouth once daily., Disp: , Rfl:     magnesium hydroxide (Milk of Magnesia) 2,400 mg/10 mL suspension suspension, Take 30 mL by mouth once daily as needed for  "constipation., Disp: , Rfl:     meloxicam (Mobic) 7.5 mg tablet, Take 1 tablet (7.5 mg) by mouth once daily. Administer with food to reduce GI upset., Disp: , Rfl:     menthol-zinc oxide (Zinc-Oxyde Plus) 0.44-20 % ointment, Apply 1 Application topically if needed. After incontinence, Disp: , Rfl:     mupirocin (Bactroban) 2 % ointment, Apply topically 2 times a day for 1 dose., Disp: , Rfl:     nystatin (Mycostatin) 100,000 unit/gram powder, Apply 1 Application topically 2 times a day. Apply to groin., Disp: , Rfl:     omeprazole OTC (PriLOSEC OTC) 20 mg EC tablet, Take 1 tablet (20 mg) by mouth once daily in the morning. Take before meals. Do not crush, chew, or split., Disp: , Rfl:     ondansetron ODT (Zofran-ODT) 4 mg disintegrating tablet, Take 1 tablet (4 mg) by mouth every 8 hours if needed for nausea or vomiting., Disp: 20 tablet, Rfl: 0    oxygen (O2) gas therapy, Inhale 1 each every 8 hours., Disp: , Rfl:     polyethylene glycol (Glycolax, Miralax) 17 gram packet, Take 17 g by mouth once daily as needed (constipation)., Disp: , Rfl:     sennosides (Senokot) 8.6 mg tablet, Take 2 tablets (17.2 mg) by mouth 2 times a day., Disp: , Rfl:     topiramate (Topamax) 50 mg tablet, Take 1 tablet (50 mg) by mouth once daily., Disp: 30 tablet, Rfl: 5    wound dressing (Triad Wound Dressing) paste, Apply 1 Application topically 3 times a day. Apply to affected areas (buttocks, posterior thighs) once daily, do not scrub off prior application when reapplying. Apply 1/16\" thick., Disp: , Rfl:    Assessment/Plan   Active Problems:  There are no active Hospital Problems.  UTI  Toxic and metabolic encephalopathy  Oral thrush  Decubitus ulcer  Morbid obesity  GERD        Continue current medication.  Supportive care.  Physical therapy (.  Patient medically stable for discharge when arrangements are made.  ID input appreciated.  We will take DVT, fall, aspiration, decubitus, and DVT prophylaxis.  Discussed with the social " worker about the discharge plan.  Patient can go back to long-term facility.       Arnoldo Sanchez MD